# Patient Record
Sex: FEMALE | Race: WHITE | NOT HISPANIC OR LATINO
[De-identification: names, ages, dates, MRNs, and addresses within clinical notes are randomized per-mention and may not be internally consistent; named-entity substitution may affect disease eponyms.]

---

## 2017-07-26 ENCOUNTER — RX RENEWAL (OUTPATIENT)
Age: 57
End: 2017-07-26

## 2017-07-27 ENCOUNTER — APPOINTMENT (OUTPATIENT)
Dept: MAMMOGRAPHY | Facility: CLINIC | Age: 57
End: 2017-07-27
Payer: COMMERCIAL

## 2017-07-27 ENCOUNTER — OUTPATIENT (OUTPATIENT)
Dept: OUTPATIENT SERVICES | Facility: HOSPITAL | Age: 57
LOS: 1 days | End: 2017-07-27
Payer: COMMERCIAL

## 2017-07-27 ENCOUNTER — APPOINTMENT (OUTPATIENT)
Dept: ULTRASOUND IMAGING | Facility: CLINIC | Age: 57
End: 2017-07-27
Payer: COMMERCIAL

## 2017-07-27 DIAGNOSIS — Z00.8 ENCOUNTER FOR OTHER GENERAL EXAMINATION: ICD-10-CM

## 2017-07-27 PROCEDURE — G0204: CPT | Mod: 26

## 2017-07-27 PROCEDURE — 76641 ULTRASOUND BREAST COMPLETE: CPT | Mod: 26,50

## 2017-07-27 PROCEDURE — G0279: CPT | Mod: 26

## 2017-07-27 PROCEDURE — 76641 ULTRASOUND BREAST COMPLETE: CPT

## 2017-07-27 PROCEDURE — 77066 DX MAMMO INCL CAD BI: CPT

## 2017-07-27 PROCEDURE — G0279: CPT

## 2017-08-03 ENCOUNTER — RESULT REVIEW (OUTPATIENT)
Age: 57
End: 2017-08-03

## 2017-08-03 ENCOUNTER — APPOINTMENT (OUTPATIENT)
Dept: ULTRASOUND IMAGING | Facility: CLINIC | Age: 57
End: 2017-08-03
Payer: COMMERCIAL

## 2017-08-03 ENCOUNTER — OUTPATIENT (OUTPATIENT)
Dept: OUTPATIENT SERVICES | Facility: HOSPITAL | Age: 57
LOS: 1 days | End: 2017-08-03
Payer: SELF-PAY

## 2017-08-03 DIAGNOSIS — Z00.8 ENCOUNTER FOR OTHER GENERAL EXAMINATION: ICD-10-CM

## 2017-08-03 PROCEDURE — 19084 BX BREAST ADD LESION US IMAG: CPT

## 2017-08-03 PROCEDURE — A4648: CPT

## 2017-08-03 PROCEDURE — 88305 TISSUE EXAM BY PATHOLOGIST: CPT

## 2017-08-03 PROCEDURE — 88305 TISSUE EXAM BY PATHOLOGIST: CPT | Mod: 26

## 2017-08-03 PROCEDURE — 77065 DX MAMMO INCL CAD UNI: CPT

## 2017-08-03 PROCEDURE — 19084 BX BREAST ADD LESION US IMAG: CPT | Mod: RT

## 2017-08-03 PROCEDURE — 19083 BX BREAST 1ST LESION US IMAG: CPT | Mod: RT

## 2017-08-03 PROCEDURE — 19083 BX BREAST 1ST LESION US IMAG: CPT

## 2017-08-03 PROCEDURE — G0206: CPT | Mod: 26,RT

## 2017-08-07 DIAGNOSIS — R92.8 OTHER ABNORMAL AND INCONCLUSIVE FINDINGS ON DIAGNOSTIC IMAGING OF BREAST: ICD-10-CM

## 2017-08-07 DIAGNOSIS — N63 UNSPECIFIED LUMP IN BREAST: ICD-10-CM

## 2017-08-14 ENCOUNTER — APPOINTMENT (OUTPATIENT)
Dept: SURGERY | Facility: CLINIC | Age: 57
End: 2017-08-14
Payer: COMMERCIAL

## 2017-08-14 VITALS
SYSTOLIC BLOOD PRESSURE: 166 MMHG | OXYGEN SATURATION: 99 % | WEIGHT: 190 LBS | RESPIRATION RATE: 14 BRPM | BODY MASS INDEX: 33.66 KG/M2 | HEIGHT: 63 IN | TEMPERATURE: 98 F | HEART RATE: 107 BPM | DIASTOLIC BLOOD PRESSURE: 104 MMHG

## 2017-08-14 DIAGNOSIS — N63 UNSPECIFIED LUMP IN BREAST: ICD-10-CM

## 2017-08-14 DIAGNOSIS — Z86.2 PERSONAL HISTORY OF DISEASES OF THE BLOOD AND BLOOD-FORMING ORGANS AND CERTAIN DISORDERS INVOLVING THE IMMUNE MECHANISM: ICD-10-CM

## 2017-08-14 PROCEDURE — 99205 OFFICE O/P NEW HI 60 MIN: CPT

## 2017-08-16 ENCOUNTER — OUTPATIENT (OUTPATIENT)
Dept: OUTPATIENT SERVICES | Facility: HOSPITAL | Age: 57
LOS: 1 days | Discharge: HOME | End: 2017-08-16

## 2017-08-16 PROBLEM — N63 LUMP OF RIGHT BREAST: Status: ACTIVE | Noted: 2017-08-16

## 2017-08-18 ENCOUNTER — APPOINTMENT (OUTPATIENT)
Dept: RADIATION ONCOLOGY | Facility: CLINIC | Age: 57
End: 2017-08-18
Payer: COMMERCIAL

## 2017-08-18 VITALS
DIASTOLIC BLOOD PRESSURE: 93 MMHG | HEART RATE: 84 BPM | HEIGHT: 63 IN | SYSTOLIC BLOOD PRESSURE: 137 MMHG | BODY MASS INDEX: 33.73 KG/M2 | RESPIRATION RATE: 16 BRPM | OXYGEN SATURATION: 98 % | WEIGHT: 190.4 LBS

## 2017-08-18 PROCEDURE — 99204 OFFICE O/P NEW MOD 45 MIN: CPT

## 2017-08-18 RX ORDER — POLYETHYLENE GLYCOL 3350, SODIUM CHLORIDE, POTASSIUM CHLORIDE, SODIUM BICARBONATE, AND SODIUM SULFATE 240; 5.84; 2.98; 6.72; 22.72 G/4L; G/4L; G/4L; G/4L; G/4L
240 POWDER, FOR SOLUTION ORAL
Qty: 4000 | Refills: 0 | Status: DISCONTINUED | COMMUNITY
Start: 2017-07-24 | End: 2017-08-18

## 2017-08-18 RX ORDER — AMOXICILLIN AND CLAVULANATE POTASSIUM 875; 125 MG/1; MG/1
875-125 TABLET, COATED ORAL
Qty: 20 | Refills: 0 | Status: DISCONTINUED | COMMUNITY
Start: 2017-06-12 | End: 2017-08-18

## 2017-08-18 RX ORDER — LIDOCAINE HYDROCHLORIDE 20 MG/ML
2 SOLUTION OROPHARYNGEAL
Qty: 100 | Refills: 0 | Status: DISCONTINUED | COMMUNITY
Start: 2017-06-12 | End: 2017-08-18

## 2017-08-18 RX ORDER — FLUCONAZOLE 150 MG/1
150 TABLET ORAL
Qty: 3 | Refills: 0 | Status: DISCONTINUED | COMMUNITY
Start: 2017-06-24 | End: 2017-08-18

## 2017-08-21 ENCOUNTER — OUTPATIENT (OUTPATIENT)
Dept: OUTPATIENT SERVICES | Facility: HOSPITAL | Age: 57
LOS: 1 days | End: 2017-08-21
Payer: COMMERCIAL

## 2017-08-21 ENCOUNTER — OUTPATIENT (OUTPATIENT)
Dept: OUTPATIENT SERVICES | Facility: HOSPITAL | Age: 57
LOS: 1 days | Discharge: HOME | End: 2017-08-21

## 2017-08-21 ENCOUNTER — APPOINTMENT (OUTPATIENT)
Dept: MRI IMAGING | Facility: CLINIC | Age: 57
End: 2017-08-21
Payer: COMMERCIAL

## 2017-08-21 DIAGNOSIS — C50.911 MALIGNANT NEOPLASM OF UNSPECIFIED SITE OF RIGHT FEMALE BREAST: ICD-10-CM

## 2017-08-21 DIAGNOSIS — C50.919 MALIGNANT NEOPLASM OF UNSPECIFIED SITE OF UNSPECIFIED FEMALE BREAST: ICD-10-CM

## 2017-08-21 DIAGNOSIS — R92.8 OTHER ABNORMAL AND INCONCLUSIVE FINDINGS ON DIAGNOSTIC IMAGING OF BREAST: ICD-10-CM

## 2017-08-21 PROCEDURE — C8937: CPT

## 2017-08-21 PROCEDURE — 77059 MRI BREAST BILATERAL: CPT | Mod: 26

## 2017-08-21 PROCEDURE — C8908: CPT

## 2017-08-21 PROCEDURE — 0159T: CPT | Mod: 26

## 2017-08-21 PROCEDURE — 82565 ASSAY OF CREATININE: CPT

## 2017-08-21 PROCEDURE — A9585: CPT

## 2017-08-22 ENCOUNTER — OUTPATIENT (OUTPATIENT)
Dept: OUTPATIENT SERVICES | Facility: HOSPITAL | Age: 57
LOS: 1 days | Discharge: HOME | End: 2017-08-22

## 2017-08-22 DIAGNOSIS — C50.919 MALIGNANT NEOPLASM OF UNSPECIFIED SITE OF UNSPECIFIED FEMALE BREAST: ICD-10-CM

## 2017-08-23 ENCOUNTER — RESULT REVIEW (OUTPATIENT)
Age: 57
End: 2017-08-23

## 2017-08-24 ENCOUNTER — APPOINTMENT (OUTPATIENT)
Dept: SURGERY | Facility: CLINIC | Age: 57
End: 2017-08-24

## 2017-08-28 ENCOUNTER — RESULT REVIEW (OUTPATIENT)
Age: 57
End: 2017-08-28

## 2017-08-28 ENCOUNTER — APPOINTMENT (OUTPATIENT)
Dept: MRI IMAGING | Facility: CLINIC | Age: 57
End: 2017-08-28
Payer: COMMERCIAL

## 2017-08-28 ENCOUNTER — OUTPATIENT (OUTPATIENT)
Dept: OUTPATIENT SERVICES | Facility: HOSPITAL | Age: 57
LOS: 1 days | End: 2017-08-28
Payer: COMMERCIAL

## 2017-08-28 DIAGNOSIS — Z00.8 ENCOUNTER FOR OTHER GENERAL EXAMINATION: ICD-10-CM

## 2017-08-28 PROCEDURE — 77065 DX MAMMO INCL CAD UNI: CPT

## 2017-08-28 PROCEDURE — 19086 BX BREAST ADD LESION MR IMAG: CPT | Mod: RT

## 2017-08-28 PROCEDURE — 19085 BX BREAST 1ST LESION MR IMAG: CPT

## 2017-08-28 PROCEDURE — 82565 ASSAY OF CREATININE: CPT

## 2017-08-28 PROCEDURE — G0206: CPT | Mod: 26,LT

## 2017-08-28 PROCEDURE — A9585: CPT

## 2017-08-28 PROCEDURE — 19085 BX BREAST 1ST LESION MR IMAG: CPT | Mod: RT

## 2017-08-30 ENCOUNTER — APPOINTMENT (OUTPATIENT)
Dept: SURGERY | Facility: CLINIC | Age: 57
End: 2017-08-30
Payer: COMMERCIAL

## 2017-08-30 VITALS
RESPIRATION RATE: 15 BRPM | OXYGEN SATURATION: 94 % | BODY MASS INDEX: 33.66 KG/M2 | SYSTOLIC BLOOD PRESSURE: 145 MMHG | HEIGHT: 63 IN | TEMPERATURE: 98.2 F | HEART RATE: 79 BPM | WEIGHT: 190 LBS | DIASTOLIC BLOOD PRESSURE: 93 MMHG

## 2017-08-30 DIAGNOSIS — R92.8 OTHER ABNORMAL AND INCONCLUSIVE FINDINGS ON DIAGNOSTIC IMAGING OF BREAST: ICD-10-CM

## 2017-08-30 DIAGNOSIS — C50.911 MALIGNANT NEOPLASM OF UNSPECIFIED SITE OF RIGHT FEMALE BREAST: ICD-10-CM

## 2017-08-30 PROCEDURE — 99215 OFFICE O/P EST HI 40 MIN: CPT

## 2017-09-06 ENCOUNTER — RESULT REVIEW (OUTPATIENT)
Age: 57
End: 2017-09-06

## 2017-09-06 ENCOUNTER — APPOINTMENT (OUTPATIENT)
Dept: HEMATOLOGY ONCOLOGY | Facility: CLINIC | Age: 57
End: 2017-09-06
Payer: COMMERCIAL

## 2017-09-06 VITALS
TEMPERATURE: 98.4 F | SYSTOLIC BLOOD PRESSURE: 143 MMHG | OXYGEN SATURATION: 99 % | DIASTOLIC BLOOD PRESSURE: 86 MMHG | BODY MASS INDEX: 34.87 KG/M2 | HEART RATE: 90 BPM | HEIGHT: 62.32 IN | RESPIRATION RATE: 16 BRPM | WEIGHT: 191.91 LBS

## 2017-09-06 DIAGNOSIS — K76.9 LIVER DISEASE, UNSPECIFIED: ICD-10-CM

## 2017-09-06 LAB
BASOPHILS # BLD AUTO: 0.1 K/UL — SIGNIFICANT CHANGE UP (ref 0–0.2)
BASOPHILS NFR BLD AUTO: 0.8 % — SIGNIFICANT CHANGE UP (ref 0–2)
EOSINOPHIL # BLD AUTO: 0.2 K/UL — SIGNIFICANT CHANGE UP (ref 0–0.5)
EOSINOPHIL NFR BLD AUTO: 2.3 % — SIGNIFICANT CHANGE UP (ref 0–6)
HCT VFR BLD CALC: 41.7 % — SIGNIFICANT CHANGE UP (ref 34.5–45)
HGB BLD-MCNC: 13.7 G/DL — SIGNIFICANT CHANGE UP (ref 11.5–15.5)
LYMPHOCYTES # BLD AUTO: 1.6 K/UL — SIGNIFICANT CHANGE UP (ref 1–3.3)
LYMPHOCYTES # BLD AUTO: 20.4 % — SIGNIFICANT CHANGE UP (ref 13–44)
MCHC RBC-ENTMCNC: 27.8 PG — SIGNIFICANT CHANGE UP (ref 27–34)
MCHC RBC-ENTMCNC: 32.8 GM/DL — SIGNIFICANT CHANGE UP (ref 32–36)
MCV RBC AUTO: 84.8 FL — SIGNIFICANT CHANGE UP (ref 80–100)
MONOCYTES # BLD AUTO: 0.5 K/UL — SIGNIFICANT CHANGE UP (ref 0–0.9)
MONOCYTES NFR BLD AUTO: 6 % — SIGNIFICANT CHANGE UP (ref 2–14)
NEUTROPHILS # BLD AUTO: 5.5 K/UL — SIGNIFICANT CHANGE UP (ref 1.8–7.4)
NEUTROPHILS NFR BLD AUTO: 70.4 % — SIGNIFICANT CHANGE UP (ref 43–77)
PLATELET # BLD AUTO: 290 K/UL — SIGNIFICANT CHANGE UP (ref 150–400)
RBC # BLD: 4.92 M/UL — SIGNIFICANT CHANGE UP (ref 3.8–5.2)
RBC # FLD: 11.4 % — SIGNIFICANT CHANGE UP (ref 10.3–14.5)
WBC # BLD: 7.8 K/UL — SIGNIFICANT CHANGE UP (ref 3.8–10.5)
WBC # FLD AUTO: 7.8 K/UL — SIGNIFICANT CHANGE UP (ref 3.8–10.5)

## 2017-09-06 PROCEDURE — 99205 OFFICE O/P NEW HI 60 MIN: CPT

## 2017-09-07 ENCOUNTER — FORM ENCOUNTER (OUTPATIENT)
Age: 57
End: 2017-09-07

## 2017-09-07 LAB
ALBUMIN SERPL ELPH-MCNC: 4.7 G/DL
ALP BLD-CCNC: 95 U/L
ALT SERPL-CCNC: 16 U/L
ANION GAP SERPL CALC-SCNC: 17 MMOL/L
AST SERPL-CCNC: 21 U/L
BILIRUB SERPL-MCNC: 0.3 MG/DL
BUN SERPL-MCNC: 22 MG/DL
CALCIUM SERPL-MCNC: 10.7 MG/DL
CHLORIDE SERPL-SCNC: 100 MMOL/L
CO2 SERPL-SCNC: 25 MMOL/L
CREAT SERPL-MCNC: 0.83 MG/DL
GLUCOSE SERPL-MCNC: 93 MG/DL
HBV CORE IGM SER QL: NONREACTIVE
HBV SURFACE AB SER QL: NONREACTIVE
HBV SURFACE AB SERPL IA-ACNC: <3 MIU/ML
HBV SURFACE AG SER QL: NONREACTIVE
HCV AB SER QL: NONREACTIVE
HCV S/CO RATIO: 0.27 S/CO
MAGNESIUM SERPL-MCNC: 2.1 MG/DL
POTASSIUM SERPL-SCNC: 4.1 MMOL/L
PROT SERPL-MCNC: 7.6 G/DL
SODIUM SERPL-SCNC: 142 MMOL/L

## 2017-09-08 ENCOUNTER — OUTPATIENT (OUTPATIENT)
Dept: OUTPATIENT SERVICES | Facility: HOSPITAL | Age: 57
LOS: 1 days | End: 2017-09-08
Payer: COMMERCIAL

## 2017-09-08 DIAGNOSIS — C50.911 MALIGNANT NEOPLASM OF UNSPECIFIED SITE OF RIGHT FEMALE BREAST: ICD-10-CM

## 2017-09-08 PROCEDURE — 93306 TTE W/DOPPLER COMPLETE: CPT

## 2017-09-08 PROCEDURE — 93306 TTE W/DOPPLER COMPLETE: CPT | Mod: 26

## 2017-09-11 ENCOUNTER — FORM ENCOUNTER (OUTPATIENT)
Age: 57
End: 2017-09-11

## 2017-09-12 ENCOUNTER — APPOINTMENT (OUTPATIENT)
Dept: ULTRASOUND IMAGING | Facility: CLINIC | Age: 57
End: 2017-09-12
Payer: COMMERCIAL

## 2017-09-12 ENCOUNTER — OUTPATIENT (OUTPATIENT)
Dept: OUTPATIENT SERVICES | Facility: HOSPITAL | Age: 57
LOS: 1 days | End: 2017-09-12

## 2017-09-12 DIAGNOSIS — Z00.8 ENCOUNTER FOR OTHER GENERAL EXAMINATION: ICD-10-CM

## 2017-09-12 PROCEDURE — 76705 ECHO EXAM OF ABDOMEN: CPT | Mod: 26

## 2017-09-13 ENCOUNTER — TRANSCRIPTION ENCOUNTER (OUTPATIENT)
Age: 57
End: 2017-09-13

## 2017-09-15 ENCOUNTER — OUTPATIENT (OUTPATIENT)
Dept: OUTPATIENT SERVICES | Facility: HOSPITAL | Age: 57
LOS: 1 days | End: 2017-09-15
Payer: COMMERCIAL

## 2017-09-15 VITALS
HEART RATE: 76 BPM | TEMPERATURE: 98 F | SYSTOLIC BLOOD PRESSURE: 137 MMHG | WEIGHT: 191.8 LBS | RESPIRATION RATE: 16 BRPM | DIASTOLIC BLOOD PRESSURE: 91 MMHG | HEIGHT: 62 IN

## 2017-09-15 DIAGNOSIS — Z98.891 HISTORY OF UTERINE SCAR FROM PREVIOUS SURGERY: Chronic | ICD-10-CM

## 2017-09-15 DIAGNOSIS — Z90.710 ACQUIRED ABSENCE OF BOTH CERVIX AND UTERUS: Chronic | ICD-10-CM

## 2017-09-15 DIAGNOSIS — Z98.890 OTHER SPECIFIED POSTPROCEDURAL STATES: Chronic | ICD-10-CM

## 2017-09-15 DIAGNOSIS — Z01.818 ENCOUNTER FOR OTHER PREPROCEDURAL EXAMINATION: ICD-10-CM

## 2017-09-15 DIAGNOSIS — C50.911 MALIGNANT NEOPLASM OF UNSPECIFIED SITE OF RIGHT FEMALE BREAST: ICD-10-CM

## 2017-09-15 DIAGNOSIS — I10 ESSENTIAL (PRIMARY) HYPERTENSION: ICD-10-CM

## 2017-09-15 PROCEDURE — 93010 ELECTROCARDIOGRAM REPORT: CPT

## 2017-09-15 PROCEDURE — 93005 ELECTROCARDIOGRAM TRACING: CPT

## 2017-09-15 PROCEDURE — G0463: CPT

## 2017-09-15 RX ORDER — SODIUM CHLORIDE 9 MG/ML
3 INJECTION INTRAMUSCULAR; INTRAVENOUS; SUBCUTANEOUS ONCE
Qty: 0 | Refills: 0 | Status: DISCONTINUED | OUTPATIENT
Start: 2017-09-21 | End: 2017-10-06

## 2017-09-15 NOTE — H&P PST ADULT - FAMILY HISTORY
Father  Still living? No  Family history of lung cancer, Age at diagnosis: 71-80     Mother  Still living? No  Family history of lung cancer, Age at diagnosis: 61-70     Child  Still living? Yes, Estimated age: Age Unknown  Family history of fibromyalgia, Age at diagnosis: Age Unknown

## 2017-09-15 NOTE — H&P PST ADULT - NSANTHOSAYNRD_GEN_A_CORE
No. PONCE screening performed.  STOP BANG Legend: 0-2 = LOW Risk; 3-4 = INTERMEDIATE Risk; 5-8 = HIGH Risk

## 2017-09-15 NOTE — H&P PST ADULT - PSH
History of  section    History of hysterectomy    History of oral surgery    Status post endometrial ablation

## 2017-09-20 ENCOUNTER — APPOINTMENT (OUTPATIENT)
Dept: INFUSION THERAPY | Facility: CLINIC | Age: 57
End: 2017-09-20

## 2017-09-20 ENCOUNTER — RESULT REVIEW (OUTPATIENT)
Age: 57
End: 2017-09-20

## 2017-09-20 ENCOUNTER — LABORATORY RESULT (OUTPATIENT)
Age: 57
End: 2017-09-20

## 2017-09-20 ENCOUNTER — CLINICAL ADVICE (OUTPATIENT)
Age: 57
End: 2017-09-20

## 2017-09-20 LAB
BASOPHILS # BLD AUTO: 0 K/UL — SIGNIFICANT CHANGE UP (ref 0–0.2)
BASOPHILS NFR BLD AUTO: 0.4 % — SIGNIFICANT CHANGE UP (ref 0–2)
EOSINOPHIL # BLD AUTO: 0 K/UL — SIGNIFICANT CHANGE UP (ref 0–0.5)
EOSINOPHIL NFR BLD AUTO: 0.1 % — SIGNIFICANT CHANGE UP (ref 0–6)
HCT VFR BLD CALC: 42.3 % — SIGNIFICANT CHANGE UP (ref 34.5–45)
HGB BLD-MCNC: 15 G/DL — SIGNIFICANT CHANGE UP (ref 11.5–15.5)
LYMPHOCYTES # BLD AUTO: 1 K/UL — SIGNIFICANT CHANGE UP (ref 1–3.3)
LYMPHOCYTES # BLD AUTO: 7.7 % — LOW (ref 13–44)
MCHC RBC-ENTMCNC: 29.5 PG — SIGNIFICANT CHANGE UP (ref 27–34)
MCHC RBC-ENTMCNC: 35.4 GM/DL — SIGNIFICANT CHANGE UP (ref 32–36)
MCV RBC AUTO: 83.4 FL — SIGNIFICANT CHANGE UP (ref 80–100)
MONOCYTES # BLD AUTO: 0.2 K/UL — SIGNIFICANT CHANGE UP (ref 0–0.9)
MONOCYTES NFR BLD AUTO: 1.6 % — LOW (ref 2–14)
NEUTROPHILS # BLD AUTO: 12 K/UL — HIGH (ref 1.8–7.4)
NEUTROPHILS NFR BLD AUTO: 90.3 % — HIGH (ref 43–77)
PLATELET # BLD AUTO: 279 K/UL — SIGNIFICANT CHANGE UP (ref 150–400)
RBC # BLD: 5.07 M/UL — SIGNIFICANT CHANGE UP (ref 3.8–5.2)
RBC # FLD: 11.4 % — SIGNIFICANT CHANGE UP (ref 10.3–14.5)
WBC # BLD: 13.3 K/UL — HIGH (ref 3.8–10.5)
WBC # FLD AUTO: 13.3 K/UL — HIGH (ref 3.8–10.5)

## 2017-09-21 ENCOUNTER — OUTPATIENT (OUTPATIENT)
Dept: INPATIENT UNIT | Facility: HOSPITAL | Age: 57
LOS: 1 days | End: 2017-09-21
Payer: COMMERCIAL

## 2017-09-21 VITALS
HEART RATE: 72 BPM | DIASTOLIC BLOOD PRESSURE: 70 MMHG | OXYGEN SATURATION: 99 % | RESPIRATION RATE: 16 BRPM | SYSTOLIC BLOOD PRESSURE: 135 MMHG

## 2017-09-21 VITALS
WEIGHT: 191.8 LBS | TEMPERATURE: 98 F | OXYGEN SATURATION: 99 % | RESPIRATION RATE: 16 BRPM | SYSTOLIC BLOOD PRESSURE: 129 MMHG | HEIGHT: 62 IN | HEART RATE: 68 BPM | DIASTOLIC BLOOD PRESSURE: 73 MMHG

## 2017-09-21 DIAGNOSIS — Z90.710 ACQUIRED ABSENCE OF BOTH CERVIX AND UTERUS: Chronic | ICD-10-CM

## 2017-09-21 DIAGNOSIS — R11.2 NAUSEA WITH VOMITING, UNSPECIFIED: ICD-10-CM

## 2017-09-21 DIAGNOSIS — Z01.818 ENCOUNTER FOR OTHER PREPROCEDURAL EXAMINATION: ICD-10-CM

## 2017-09-21 DIAGNOSIS — Z98.890 OTHER SPECIFIED POSTPROCEDURAL STATES: Chronic | ICD-10-CM

## 2017-09-21 DIAGNOSIS — Z51.11 ENCOUNTER FOR ANTINEOPLASTIC CHEMOTHERAPY: ICD-10-CM

## 2017-09-21 DIAGNOSIS — Z98.891 HISTORY OF UTERINE SCAR FROM PREVIOUS SURGERY: Chronic | ICD-10-CM

## 2017-09-21 LAB
INR BLD: 0.97 RATIO — SIGNIFICANT CHANGE UP (ref 0.88–1.16)
PROTHROM AB SERPL-ACNC: 10.7 SEC — SIGNIFICANT CHANGE UP (ref 9.8–12.7)

## 2017-09-21 PROCEDURE — 36561 INSERT TUNNELED CV CATH: CPT | Mod: LT

## 2017-09-21 PROCEDURE — C1788: CPT

## 2017-09-21 PROCEDURE — C1894: CPT

## 2017-09-21 PROCEDURE — 76942 ECHO GUIDE FOR BIOPSY: CPT

## 2017-09-21 PROCEDURE — 76937 US GUIDE VASCULAR ACCESS: CPT | Mod: 26

## 2017-09-21 PROCEDURE — 77001 FLUOROGUIDE FOR VEIN DEVICE: CPT

## 2017-09-21 PROCEDURE — 36415 COLL VENOUS BLD VENIPUNCTURE: CPT

## 2017-09-21 PROCEDURE — 77001 FLUOROGUIDE FOR VEIN DEVICE: CPT | Mod: 26

## 2017-09-21 PROCEDURE — C1769: CPT

## 2017-09-21 PROCEDURE — 85610 PROTHROMBIN TIME: CPT

## 2017-09-21 PROCEDURE — 36561 INSERT TUNNELED CV CATH: CPT | Mod: RT

## 2017-09-21 RX ORDER — ONDANSETRON 8 MG/1
4 TABLET, FILM COATED ORAL ONCE
Qty: 0 | Refills: 0 | Status: DISCONTINUED | OUTPATIENT
Start: 2017-09-21 | End: 2017-09-21

## 2017-09-21 RX ORDER — SODIUM CHLORIDE 9 MG/ML
1000 INJECTION INTRAMUSCULAR; INTRAVENOUS; SUBCUTANEOUS
Qty: 0 | Refills: 0 | Status: DISCONTINUED | OUTPATIENT
Start: 2017-09-21 | End: 2017-09-21

## 2017-09-21 RX ORDER — FENTANYL CITRATE 50 UG/ML
50 INJECTION INTRAVENOUS
Qty: 0 | Refills: 0 | Status: DISCONTINUED | OUTPATIENT
Start: 2017-09-21 | End: 2017-09-21

## 2017-09-21 RX ORDER — DEXAMETHASONE 0.5 MG/5ML
8 ELIXIR ORAL ONCE
Qty: 0 | Refills: 0 | Status: DISCONTINUED | OUTPATIENT
Start: 2017-09-21 | End: 2017-09-21

## 2017-09-21 RX ORDER — HYDROMORPHONE HYDROCHLORIDE 2 MG/ML
0.5 INJECTION INTRAMUSCULAR; INTRAVENOUS; SUBCUTANEOUS
Qty: 0 | Refills: 0 | Status: DISCONTINUED | OUTPATIENT
Start: 2017-09-21 | End: 2017-09-21

## 2017-09-21 NOTE — BRIEF OPERATIVE NOTE - PRE-OP DX
Carcinoma of overlapping sites of breast in female, estrogen receptor negative, unspecified laterality  09/21/2017    Active  Eduardo Alaniz

## 2017-09-21 NOTE — ASU DISCHARGE PLAN (ADULT/PEDIATRIC). - MEDICATION SUMMARY - MEDICATIONS TO TAKE
I will START or STAY ON the medications listed below when I get home from the hospital:    dexamethasone 4 mg oral tablet  -- 1 tab(s) by mouth 2 times a day  -- Indication: For pmd    CARBOplatin  -- Indication: For pmd    Tylenol 500 mg oral tablet  -- 2 tab(s) by mouth every 6 hours, As Needed  -- Indication: For pmd    Benicar HCT 40 mg-25 mg oral tablet  -- 1 tab(s) by mouth once a day  -- Indication: For pmd    Herceptin  -- Indication: For pmd    Perjeta  -- Indication: For pmd    Taxotere  -- Indication: For pmd    Multiple Vitamins oral tablet  -- 1 tab(s) by mouth once a day  -- Indication: For pmd    Vitamin D3 1000 intl units oral tablet  -- 2 tab(s) by mouth once a day  -- Indication: For pmd

## 2017-09-21 NOTE — BRIEF OPERATIVE NOTE - PROCEDURE
<<-----Click on this checkbox to enter Procedure Port fluoroscop surgical proc  09/21/2017    Active  SYD

## 2017-09-21 NOTE — BRIEF OPERATIVE NOTE - POST-OP DX
Carcinoma of female breast, unspecified estrogen receptor status, unspecified laterality, unspecified site of breast  09/21/2017    Active  Eduardo Alaniz

## 2017-09-26 ENCOUNTER — OUTPATIENT (OUTPATIENT)
Dept: OUTPATIENT SERVICES | Facility: HOSPITAL | Age: 57
LOS: 1 days | Discharge: HOME | End: 2017-09-26

## 2017-09-26 DIAGNOSIS — Z98.891 HISTORY OF UTERINE SCAR FROM PREVIOUS SURGERY: Chronic | ICD-10-CM

## 2017-09-26 DIAGNOSIS — Z98.890 OTHER SPECIFIED POSTPROCEDURAL STATES: Chronic | ICD-10-CM

## 2017-09-26 DIAGNOSIS — C50.919 MALIGNANT NEOPLASM OF UNSPECIFIED SITE OF UNSPECIFIED FEMALE BREAST: ICD-10-CM

## 2017-09-26 DIAGNOSIS — Z90.710 ACQUIRED ABSENCE OF BOTH CERVIX AND UTERUS: Chronic | ICD-10-CM

## 2017-09-27 ENCOUNTER — TRANSCRIPTION ENCOUNTER (OUTPATIENT)
Age: 57
End: 2017-09-27

## 2017-09-28 ENCOUNTER — LABORATORY RESULT (OUTPATIENT)
Age: 57
End: 2017-09-28

## 2017-09-28 ENCOUNTER — RESULT REVIEW (OUTPATIENT)
Age: 57
End: 2017-09-28

## 2017-09-28 ENCOUNTER — APPOINTMENT (OUTPATIENT)
Dept: INFUSION THERAPY | Facility: CLINIC | Age: 57
End: 2017-09-28

## 2017-09-28 ENCOUNTER — APPOINTMENT (OUTPATIENT)
Dept: HEMATOLOGY ONCOLOGY | Facility: CLINIC | Age: 57
End: 2017-09-28
Payer: COMMERCIAL

## 2017-09-28 VITALS
SYSTOLIC BLOOD PRESSURE: 121 MMHG | DIASTOLIC BLOOD PRESSURE: 81 MMHG | HEART RATE: 104 BPM | TEMPERATURE: 98.6 F | WEIGHT: 188.14 LBS | OXYGEN SATURATION: 99 % | RESPIRATION RATE: 16 BRPM | BODY MASS INDEX: 34.06 KG/M2

## 2017-09-28 DIAGNOSIS — T45.1X5A AGRANULOCYTOSIS SECONDARY TO CANCER CHEMOTHERAPY: ICD-10-CM

## 2017-09-28 DIAGNOSIS — D70.1 AGRANULOCYTOSIS SECONDARY TO CANCER CHEMOTHERAPY: ICD-10-CM

## 2017-09-28 LAB
BASOPHILS # BLD AUTO: 0 K/UL — SIGNIFICANT CHANGE UP (ref 0–0.2)
BASOPHILS NFR BLD AUTO: 3.7 % — HIGH (ref 0–2)
EOSINOPHIL # BLD AUTO: 0 K/UL — SIGNIFICANT CHANGE UP (ref 0–0.5)
EOSINOPHIL NFR BLD AUTO: 2.9 % — SIGNIFICANT CHANGE UP (ref 0–6)
HCT VFR BLD CALC: 35.8 % — SIGNIFICANT CHANGE UP (ref 34.5–45)
HGB BLD-MCNC: 12.3 G/DL — SIGNIFICANT CHANGE UP (ref 11.5–15.5)
LG PLATELETS BLD QL AUTO: SLIGHT — SIGNIFICANT CHANGE UP
LYMPHOCYTES # BLD AUTO: 0.4 K/UL — LOW (ref 1–3.3)
LYMPHOCYTES # BLD AUTO: 56 % — HIGH (ref 13–44)
MCHC RBC-ENTMCNC: 28.4 PG — SIGNIFICANT CHANGE UP (ref 27–34)
MCHC RBC-ENTMCNC: 34.5 GM/DL — SIGNIFICANT CHANGE UP (ref 32–36)
MCV RBC AUTO: 82.4 FL — SIGNIFICANT CHANGE UP (ref 80–100)
MONOCYTES # BLD AUTO: 0.2 K/UL — SIGNIFICANT CHANGE UP (ref 0–0.9)
MONOCYTES NFR BLD AUTO: 19.3 % — HIGH (ref 2–14)
NEUTROPHILS # BLD AUTO: 0.1 K/UL — LOW (ref 1.8–7.4)
NEUTROPHILS NFR BLD AUTO: 18.1 % — LOW (ref 43–77)
PLAT MORPH BLD: NORMAL — SIGNIFICANT CHANGE UP
PLATELET # BLD AUTO: 152 K/UL — SIGNIFICANT CHANGE UP (ref 150–400)
RBC # BLD: 4.34 M/UL — SIGNIFICANT CHANGE UP (ref 3.8–5.2)
RBC # FLD: 10.7 % — SIGNIFICANT CHANGE UP (ref 10.3–14.5)
RBC BLD AUTO: NORMAL — SIGNIFICANT CHANGE UP
WBC # BLD: 0.8 K/UL — CRITICAL LOW (ref 3.8–10.5)
WBC # FLD AUTO: 0.8 K/UL — CRITICAL LOW (ref 3.8–10.5)

## 2017-09-28 PROCEDURE — 99214 OFFICE O/P EST MOD 30 MIN: CPT

## 2017-10-05 ENCOUNTER — RESULT REVIEW (OUTPATIENT)
Age: 57
End: 2017-10-05

## 2017-10-05 ENCOUNTER — APPOINTMENT (OUTPATIENT)
Dept: INFUSION THERAPY | Facility: CLINIC | Age: 57
End: 2017-10-05

## 2017-10-05 ENCOUNTER — APPOINTMENT (OUTPATIENT)
Dept: HEMATOLOGY ONCOLOGY | Facility: CLINIC | Age: 57
End: 2017-10-05

## 2017-10-05 PROBLEM — C50.911 HER2-POSITIVE CARCINOMA OF RIGHT BREAST: Status: RESOLVED | Noted: 2017-08-16 | Resolved: 2017-10-05

## 2017-10-05 PROBLEM — R92.8 ABNORMAL FINDINGS ON DIAGNOSTIC IMAGING OF BREAST: Status: ACTIVE | Noted: 2017-08-16

## 2017-10-05 LAB
BASOPHILS # BLD AUTO: 0 K/UL — SIGNIFICANT CHANGE UP (ref 0–0.2)
BASOPHILS NFR BLD AUTO: 0.6 % — SIGNIFICANT CHANGE UP (ref 0–2)
EOSINOPHIL # BLD AUTO: 0 K/UL — SIGNIFICANT CHANGE UP (ref 0–0.5)
EOSINOPHIL NFR BLD AUTO: 0.2 % — SIGNIFICANT CHANGE UP (ref 0–6)
HCT VFR BLD CALC: 35 % — SIGNIFICANT CHANGE UP (ref 34.5–45)
HGB BLD-MCNC: 11.8 G/DL — SIGNIFICANT CHANGE UP (ref 11.5–15.5)
LYMPHOCYTES # BLD AUTO: 1.5 K/UL — SIGNIFICANT CHANGE UP (ref 1–3.3)
LYMPHOCYTES # BLD AUTO: 23.3 % — SIGNIFICANT CHANGE UP (ref 13–44)
MCHC RBC-ENTMCNC: 28.2 PG — SIGNIFICANT CHANGE UP (ref 27–34)
MCHC RBC-ENTMCNC: 33.9 GM/DL — SIGNIFICANT CHANGE UP (ref 32–36)
MCV RBC AUTO: 83.3 FL — SIGNIFICANT CHANGE UP (ref 80–100)
MONOCYTES # BLD AUTO: 0.6 K/UL — SIGNIFICANT CHANGE UP (ref 0–0.9)
MONOCYTES NFR BLD AUTO: 9.1 % — SIGNIFICANT CHANGE UP (ref 2–14)
NEUTROPHILS # BLD AUTO: 4.4 K/UL — SIGNIFICANT CHANGE UP (ref 1.8–7.4)
NEUTROPHILS NFR BLD AUTO: 66.8 % — SIGNIFICANT CHANGE UP (ref 43–77)
PLATELET # BLD AUTO: 160 K/UL — SIGNIFICANT CHANGE UP (ref 150–400)
RBC # BLD: 4.2 M/UL — SIGNIFICANT CHANGE UP (ref 3.8–5.2)
RBC # FLD: 11.5 % — SIGNIFICANT CHANGE UP (ref 10.3–14.5)
WBC # BLD: 6.6 K/UL — SIGNIFICANT CHANGE UP (ref 3.8–10.5)
WBC # FLD AUTO: 6.6 K/UL — SIGNIFICANT CHANGE UP (ref 3.8–10.5)

## 2017-10-11 ENCOUNTER — LABORATORY RESULT (OUTPATIENT)
Age: 57
End: 2017-10-11

## 2017-10-11 ENCOUNTER — RESULT REVIEW (OUTPATIENT)
Age: 57
End: 2017-10-11

## 2017-10-11 ENCOUNTER — APPOINTMENT (OUTPATIENT)
Dept: INFUSION THERAPY | Facility: CLINIC | Age: 57
End: 2017-10-11
Payer: COMMERCIAL

## 2017-10-11 ENCOUNTER — APPOINTMENT (OUTPATIENT)
Dept: HEMATOLOGY ONCOLOGY | Facility: CLINIC | Age: 57
End: 2017-10-11
Payer: COMMERCIAL

## 2017-10-11 LAB
BASOPHILS # BLD AUTO: 0 K/UL — SIGNIFICANT CHANGE UP (ref 0–0.2)
BASOPHILS NFR BLD AUTO: 0.2 % — SIGNIFICANT CHANGE UP (ref 0–2)
EOSINOPHIL # BLD AUTO: 0 K/UL — SIGNIFICANT CHANGE UP (ref 0–0.5)
EOSINOPHIL NFR BLD AUTO: 0.1 % — SIGNIFICANT CHANGE UP (ref 0–6)
HCT VFR BLD CALC: 36.3 % — SIGNIFICANT CHANGE UP (ref 34.5–45)
HGB BLD-MCNC: 12.7 G/DL — SIGNIFICANT CHANGE UP (ref 11.5–15.5)
LYMPHOCYTES # BLD AUTO: 0.7 K/UL — LOW (ref 1–3.3)
LYMPHOCYTES # BLD AUTO: 5.7 % — LOW (ref 13–44)
MCHC RBC-ENTMCNC: 28.9 PG — SIGNIFICANT CHANGE UP (ref 27–34)
MCHC RBC-ENTMCNC: 34.8 GM/DL — SIGNIFICANT CHANGE UP (ref 32–36)
MCV RBC AUTO: 82.9 FL — SIGNIFICANT CHANGE UP (ref 80–100)
MONOCYTES # BLD AUTO: 0.3 K/UL — SIGNIFICANT CHANGE UP (ref 0–0.9)
MONOCYTES NFR BLD AUTO: 2.4 % — SIGNIFICANT CHANGE UP (ref 2–14)
NEUTROPHILS # BLD AUTO: 11.9 K/UL — HIGH (ref 1.8–7.4)
NEUTROPHILS NFR BLD AUTO: 91.6 % — HIGH (ref 43–77)
PLATELET # BLD AUTO: 270 K/UL — SIGNIFICANT CHANGE UP (ref 150–400)
RBC # BLD: 4.38 M/UL — SIGNIFICANT CHANGE UP (ref 3.8–5.2)
RBC # FLD: 12.3 % — SIGNIFICANT CHANGE UP (ref 10.3–14.5)
WBC # BLD: 13 K/UL — HIGH (ref 3.8–10.5)
WBC # FLD AUTO: 13 K/UL — HIGH (ref 3.8–10.5)

## 2017-10-11 PROCEDURE — G0008: CPT

## 2017-10-11 PROCEDURE — 99214 OFFICE O/P EST MOD 30 MIN: CPT | Mod: 25

## 2017-10-12 ENCOUNTER — OTHER (OUTPATIENT)
Age: 57
End: 2017-10-12

## 2017-10-12 DIAGNOSIS — Z51.89 ENCOUNTER FOR OTHER SPECIFIED AFTERCARE: ICD-10-CM

## 2017-10-12 DIAGNOSIS — Z51.11 ENCOUNTER FOR ANTINEOPLASTIC CHEMOTHERAPY: ICD-10-CM

## 2017-10-12 DIAGNOSIS — R11.2 NAUSEA WITH VOMITING, UNSPECIFIED: ICD-10-CM

## 2017-10-19 ENCOUNTER — LABORATORY RESULT (OUTPATIENT)
Age: 57
End: 2017-10-19

## 2017-10-19 ENCOUNTER — RESULT REVIEW (OUTPATIENT)
Age: 57
End: 2017-10-19

## 2017-10-19 ENCOUNTER — APPOINTMENT (OUTPATIENT)
Dept: INFUSION THERAPY | Facility: CLINIC | Age: 57
End: 2017-10-19

## 2017-10-19 ENCOUNTER — APPOINTMENT (OUTPATIENT)
Dept: HEMATOLOGY ONCOLOGY | Facility: CLINIC | Age: 57
End: 2017-10-19
Payer: COMMERCIAL

## 2017-10-19 VITALS
OXYGEN SATURATION: 97 % | HEART RATE: 101 BPM | WEIGHT: 186.29 LBS | TEMPERATURE: 98.1 F | DIASTOLIC BLOOD PRESSURE: 67 MMHG | SYSTOLIC BLOOD PRESSURE: 96 MMHG | BODY MASS INDEX: 33.73 KG/M2

## 2017-10-19 LAB
BASOPHILS # BLD AUTO: 0.1 K/UL — SIGNIFICANT CHANGE UP (ref 0–0.2)
BASOPHILS NFR BLD AUTO: 1.1 % — SIGNIFICANT CHANGE UP (ref 0–2)
EOSINOPHIL # BLD AUTO: 0 K/UL — SIGNIFICANT CHANGE UP (ref 0–0.5)
EOSINOPHIL NFR BLD AUTO: 0.1 % — SIGNIFICANT CHANGE UP (ref 0–6)
HCT VFR BLD CALC: 36 % — SIGNIFICANT CHANGE UP (ref 34.5–45)
HGB BLD-MCNC: 12.1 G/DL — SIGNIFICANT CHANGE UP (ref 11.5–15.5)
LYMPHOCYTES # BLD AUTO: 1.6 K/UL — SIGNIFICANT CHANGE UP (ref 1–3.3)
LYMPHOCYTES # BLD AUTO: 25 % — SIGNIFICANT CHANGE UP (ref 13–44)
MCHC RBC-ENTMCNC: 28.2 PG — SIGNIFICANT CHANGE UP (ref 27–34)
MCHC RBC-ENTMCNC: 33.7 GM/DL — SIGNIFICANT CHANGE UP (ref 32–36)
MCV RBC AUTO: 83.6 FL — SIGNIFICANT CHANGE UP (ref 80–100)
MONOCYTES # BLD AUTO: 0.9 K/UL — SIGNIFICANT CHANGE UP (ref 0–0.9)
MONOCYTES NFR BLD AUTO: 13.4 % — SIGNIFICANT CHANGE UP (ref 2–14)
NEUTROPHILS # BLD AUTO: 4 K/UL — SIGNIFICANT CHANGE UP (ref 1.8–7.4)
NEUTROPHILS NFR BLD AUTO: 60.4 % — SIGNIFICANT CHANGE UP (ref 43–77)
PLATELET # BLD AUTO: 203 K/UL — SIGNIFICANT CHANGE UP (ref 150–400)
RBC # BLD: 4.3 M/UL — SIGNIFICANT CHANGE UP (ref 3.8–5.2)
RBC # FLD: 12 % — SIGNIFICANT CHANGE UP (ref 10.3–14.5)
WBC # BLD: 6.6 K/UL — SIGNIFICANT CHANGE UP (ref 3.8–10.5)
WBC # FLD AUTO: 6.6 K/UL — SIGNIFICANT CHANGE UP (ref 3.8–10.5)

## 2017-10-19 PROCEDURE — 99214 OFFICE O/P EST MOD 30 MIN: CPT

## 2017-10-23 ENCOUNTER — APPOINTMENT (OUTPATIENT)
Dept: CT IMAGING | Facility: CLINIC | Age: 57
End: 2017-10-23
Payer: COMMERCIAL

## 2017-10-23 ENCOUNTER — OUTPATIENT (OUTPATIENT)
Dept: OUTPATIENT SERVICES | Facility: HOSPITAL | Age: 57
LOS: 1 days | End: 2017-10-23
Payer: COMMERCIAL

## 2017-10-23 DIAGNOSIS — Z98.891 HISTORY OF UTERINE SCAR FROM PREVIOUS SURGERY: Chronic | ICD-10-CM

## 2017-10-23 DIAGNOSIS — Z98.890 OTHER SPECIFIED POSTPROCEDURAL STATES: Chronic | ICD-10-CM

## 2017-10-23 DIAGNOSIS — Z00.8 ENCOUNTER FOR OTHER GENERAL EXAMINATION: ICD-10-CM

## 2017-10-23 DIAGNOSIS — Z90.710 ACQUIRED ABSENCE OF BOTH CERVIX AND UTERUS: Chronic | ICD-10-CM

## 2017-10-23 PROCEDURE — 74174 CTA ABD&PLVS W/CONTRAST: CPT

## 2017-10-23 PROCEDURE — 82565 ASSAY OF CREATININE: CPT

## 2017-10-23 PROCEDURE — 74174 CTA ABD&PLVS W/CONTRAST: CPT | Mod: 26

## 2017-10-24 ENCOUNTER — APPOINTMENT (OUTPATIENT)
Dept: HEMATOLOGY ONCOLOGY | Facility: CLINIC | Age: 57
End: 2017-10-24

## 2017-10-24 ENCOUNTER — OUTPATIENT (OUTPATIENT)
Dept: OUTPATIENT SERVICES | Facility: HOSPITAL | Age: 57
LOS: 1 days | Discharge: HOME | End: 2017-10-24

## 2017-10-24 DIAGNOSIS — Z98.890 OTHER SPECIFIED POSTPROCEDURAL STATES: Chronic | ICD-10-CM

## 2017-10-24 DIAGNOSIS — Z90.710 ACQUIRED ABSENCE OF BOTH CERVIX AND UTERUS: Chronic | ICD-10-CM

## 2017-10-24 DIAGNOSIS — Z98.891 HISTORY OF UTERINE SCAR FROM PREVIOUS SURGERY: Chronic | ICD-10-CM

## 2017-10-24 DIAGNOSIS — C50.919 MALIGNANT NEOPLASM OF UNSPECIFIED SITE OF UNSPECIFIED FEMALE BREAST: ICD-10-CM

## 2017-10-31 NOTE — ASU PATIENT PROFILE, ADULT - FALLEN IN THE PAST
59 year old woman s/p direct laryngoscopy and tracheoplasty, SICU consult for respiratory care    Plan:    Neuro: post-op pain  pain control tylenol and oxycodone    Respiratory: s/p tracheostomy revision  continue current vent settings    CV: hypertension  continue norvasc    GI: no active issues  diet per ENT - clear liquid    : no active issues    Heme: DVT ppx  SQH    Dispo: no

## 2017-11-01 ENCOUNTER — APPOINTMENT (OUTPATIENT)
Dept: INFUSION THERAPY | Facility: CLINIC | Age: 57
End: 2017-11-01

## 2017-11-01 ENCOUNTER — LABORATORY RESULT (OUTPATIENT)
Age: 57
End: 2017-11-01

## 2017-11-01 ENCOUNTER — RESULT REVIEW (OUTPATIENT)
Age: 57
End: 2017-11-01

## 2017-11-01 LAB
BASOPHILS # BLD AUTO: 0.1 K/UL — SIGNIFICANT CHANGE UP (ref 0–0.2)
BASOPHILS NFR BLD AUTO: 0.6 % — SIGNIFICANT CHANGE UP (ref 0–2)
EOSINOPHIL # BLD AUTO: 0 K/UL — SIGNIFICANT CHANGE UP (ref 0–0.5)
EOSINOPHIL NFR BLD AUTO: 0 % — SIGNIFICANT CHANGE UP (ref 0–6)
HCT VFR BLD CALC: 30.4 % — LOW (ref 34.5–45)
HGB BLD-MCNC: 11.1 G/DL — LOW (ref 11.5–15.5)
LYMPHOCYTES # BLD AUTO: 1.1 K/UL — SIGNIFICANT CHANGE UP (ref 1–3.3)
LYMPHOCYTES # BLD AUTO: 9.5 % — LOW (ref 13–44)
MCHC RBC-ENTMCNC: 30.2 PG — SIGNIFICANT CHANGE UP (ref 27–34)
MCHC RBC-ENTMCNC: 36.5 GM/DL — HIGH (ref 32–36)
MCV RBC AUTO: 82.8 FL — SIGNIFICANT CHANGE UP (ref 80–100)
MONOCYTES # BLD AUTO: 0.7 K/UL — SIGNIFICANT CHANGE UP (ref 0–0.9)
MONOCYTES NFR BLD AUTO: 5.9 % — SIGNIFICANT CHANGE UP (ref 2–14)
NEUTROPHILS # BLD AUTO: 10 K/UL — HIGH (ref 1.8–7.4)
NEUTROPHILS NFR BLD AUTO: 84 % — HIGH (ref 43–77)
PLATELET # BLD AUTO: 406 K/UL — HIGH (ref 150–400)
RBC # BLD: 3.68 M/UL — LOW (ref 3.8–5.2)
RBC # FLD: 13.4 % — SIGNIFICANT CHANGE UP (ref 10.3–14.5)
WBC # BLD: 11.9 K/UL — HIGH (ref 3.8–10.5)
WBC # FLD AUTO: 11.9 K/UL — HIGH (ref 3.8–10.5)

## 2017-11-02 DIAGNOSIS — R11.2 NAUSEA WITH VOMITING, UNSPECIFIED: ICD-10-CM

## 2017-11-02 DIAGNOSIS — Z51.89 ENCOUNTER FOR OTHER SPECIFIED AFTERCARE: ICD-10-CM

## 2017-11-02 DIAGNOSIS — Z51.11 ENCOUNTER FOR ANTINEOPLASTIC CHEMOTHERAPY: ICD-10-CM

## 2017-11-05 ENCOUNTER — FORM ENCOUNTER (OUTPATIENT)
Age: 57
End: 2017-11-05

## 2017-11-06 ENCOUNTER — APPOINTMENT (OUTPATIENT)
Dept: MRI IMAGING | Facility: CLINIC | Age: 57
End: 2017-11-06
Payer: COMMERCIAL

## 2017-11-06 ENCOUNTER — OUTPATIENT (OUTPATIENT)
Dept: OUTPATIENT SERVICES | Facility: HOSPITAL | Age: 57
LOS: 1 days | End: 2017-11-06

## 2017-11-06 DIAGNOSIS — Z98.890 OTHER SPECIFIED POSTPROCEDURAL STATES: Chronic | ICD-10-CM

## 2017-11-06 DIAGNOSIS — Z98.891 HISTORY OF UTERINE SCAR FROM PREVIOUS SURGERY: Chronic | ICD-10-CM

## 2017-11-06 DIAGNOSIS — Z00.8 ENCOUNTER FOR OTHER GENERAL EXAMINATION: ICD-10-CM

## 2017-11-06 DIAGNOSIS — Z90.710 ACQUIRED ABSENCE OF BOTH CERVIX AND UTERUS: Chronic | ICD-10-CM

## 2017-11-06 PROCEDURE — 74183 MRI ABD W/O CNTR FLWD CNTR: CPT | Mod: 26

## 2017-11-09 ENCOUNTER — APPOINTMENT (OUTPATIENT)
Dept: INFUSION THERAPY | Facility: CLINIC | Age: 57
End: 2017-11-09

## 2017-11-09 ENCOUNTER — RESULT REVIEW (OUTPATIENT)
Age: 57
End: 2017-11-09

## 2017-11-09 ENCOUNTER — LABORATORY RESULT (OUTPATIENT)
Age: 57
End: 2017-11-09

## 2017-11-09 ENCOUNTER — APPOINTMENT (OUTPATIENT)
Dept: HEMATOLOGY ONCOLOGY | Facility: CLINIC | Age: 57
End: 2017-11-09
Payer: COMMERCIAL

## 2017-11-09 VITALS
HEART RATE: 13 BPM | BODY MASS INDEX: 33.75 KG/M2 | OXYGEN SATURATION: 99 % | WEIGHT: 186.4 LBS | TEMPERATURE: 97.9 F | SYSTOLIC BLOOD PRESSURE: 123 MMHG | DIASTOLIC BLOOD PRESSURE: 80 MMHG | RESPIRATION RATE: 16 BRPM

## 2017-11-09 LAB
ANISOCYTOSIS BLD QL: SLIGHT — SIGNIFICANT CHANGE UP
BASOPHILS # BLD AUTO: 0.2 K/UL — SIGNIFICANT CHANGE UP (ref 0–0.2)
DACRYOCYTES BLD QL SMEAR: SLIGHT — SIGNIFICANT CHANGE UP
EOSINOPHIL # BLD AUTO: 0 K/UL — SIGNIFICANT CHANGE UP (ref 0–0.5)
HCT VFR BLD CALC: 31.5 % — LOW (ref 34.5–45)
HGB BLD-MCNC: 10.5 G/DL — LOW (ref 11.5–15.5)
LYMPHOCYTES # BLD AUTO: 17 % — SIGNIFICANT CHANGE UP (ref 13–44)
LYMPHOCYTES # BLD AUTO: 2 K/UL — SIGNIFICANT CHANGE UP (ref 1–3.3)
MCHC RBC-ENTMCNC: 28 PG — SIGNIFICANT CHANGE UP (ref 27–34)
MCHC RBC-ENTMCNC: 33.4 GM/DL — SIGNIFICANT CHANGE UP (ref 32–36)
MCV RBC AUTO: 83.9 FL — SIGNIFICANT CHANGE UP (ref 80–100)
METAMYELOCYTES # FLD: 2 % — HIGH (ref 0–0)
MONOCYTES # BLD AUTO: 1.6 K/UL — HIGH (ref 0–0.9)
MONOCYTES NFR BLD AUTO: 11 % — SIGNIFICANT CHANGE UP (ref 2–14)
NEUTROPHILS # BLD AUTO: 13.7 K/UL — HIGH (ref 1.8–7.4)
NEUTROPHILS NFR BLD AUTO: 64 % — SIGNIFICANT CHANGE UP (ref 43–77)
NEUTS BAND # BLD: 5 % — SIGNIFICANT CHANGE UP (ref 0–8)
PLAT MORPH BLD: NORMAL — SIGNIFICANT CHANGE UP
PLATELET # BLD AUTO: 371 K/UL — SIGNIFICANT CHANGE UP (ref 150–400)
POIKILOCYTOSIS BLD QL AUTO: SLIGHT — SIGNIFICANT CHANGE UP
POLYCHROMASIA BLD QL SMEAR: SLIGHT — SIGNIFICANT CHANGE UP
RBC # BLD: 3.76 M/UL — LOW (ref 3.8–5.2)
RBC # FLD: 13.7 % — SIGNIFICANT CHANGE UP (ref 10.3–14.5)
RBC BLD AUTO: SIGNIFICANT CHANGE UP
VARIANT LYMPHS # BLD: 1 % — SIGNIFICANT CHANGE UP (ref 0–6)
WBC # BLD: 17.4 K/UL — HIGH (ref 3.8–10.5)
WBC # FLD AUTO: 17.4 K/UL — HIGH (ref 3.8–10.5)

## 2017-11-09 PROCEDURE — 99214 OFFICE O/P EST MOD 30 MIN: CPT

## 2017-11-16 ENCOUNTER — APPOINTMENT (OUTPATIENT)
Dept: HEMATOLOGY ONCOLOGY | Facility: CLINIC | Age: 57
End: 2017-11-16

## 2017-11-22 ENCOUNTER — LABORATORY RESULT (OUTPATIENT)
Age: 57
End: 2017-11-22

## 2017-11-22 ENCOUNTER — RESULT REVIEW (OUTPATIENT)
Age: 57
End: 2017-11-22

## 2017-11-22 ENCOUNTER — APPOINTMENT (OUTPATIENT)
Dept: INFUSION THERAPY | Facility: CLINIC | Age: 57
End: 2017-11-22

## 2017-11-22 LAB
BASOPHILS # BLD AUTO: 0 K/UL — SIGNIFICANT CHANGE UP (ref 0–0.2)
BASOPHILS NFR BLD AUTO: 0.2 % — SIGNIFICANT CHANGE UP (ref 0–2)
EOSINOPHIL # BLD AUTO: 0 K/UL — SIGNIFICANT CHANGE UP (ref 0–0.5)
EOSINOPHIL NFR BLD AUTO: 0 % — SIGNIFICANT CHANGE UP (ref 0–6)
HCT VFR BLD CALC: 29.3 % — LOW (ref 34.5–45)
HGB BLD-MCNC: 10.2 G/DL — LOW (ref 11.5–15.5)
LYMPHOCYTES # BLD AUTO: 0.8 K/UL — LOW (ref 1–3.3)
LYMPHOCYTES # BLD AUTO: 7.1 % — LOW (ref 13–44)
MCHC RBC-ENTMCNC: 29.9 PG — SIGNIFICANT CHANGE UP (ref 27–34)
MCHC RBC-ENTMCNC: 34.9 GM/DL — SIGNIFICANT CHANGE UP (ref 32–36)
MCV RBC AUTO: 85.5 FL — SIGNIFICANT CHANGE UP (ref 80–100)
MONOCYTES # BLD AUTO: 0.3 K/UL — SIGNIFICANT CHANGE UP (ref 0–0.9)
MONOCYTES NFR BLD AUTO: 2.2 % — SIGNIFICANT CHANGE UP (ref 2–14)
NEUTROPHILS # BLD AUTO: 10.6 K/UL — HIGH (ref 1.8–7.4)
NEUTROPHILS NFR BLD AUTO: 90.5 % — HIGH (ref 43–77)
PLATELET # BLD AUTO: 212 K/UL — SIGNIFICANT CHANGE UP (ref 150–400)
RBC # BLD: 3.43 M/UL — LOW (ref 3.8–5.2)
RBC # FLD: 16.3 % — HIGH (ref 10.3–14.5)
WBC # BLD: 11.7 K/UL — HIGH (ref 3.8–10.5)
WBC # FLD AUTO: 11.7 K/UL — HIGH (ref 3.8–10.5)

## 2017-11-27 ENCOUNTER — OUTPATIENT (OUTPATIENT)
Dept: OUTPATIENT SERVICES | Facility: HOSPITAL | Age: 57
LOS: 1 days | Discharge: HOME | End: 2017-11-27

## 2017-11-27 DIAGNOSIS — Z90.710 ACQUIRED ABSENCE OF BOTH CERVIX AND UTERUS: Chronic | ICD-10-CM

## 2017-11-27 DIAGNOSIS — Z98.890 OTHER SPECIFIED POSTPROCEDURAL STATES: Chronic | ICD-10-CM

## 2017-11-27 DIAGNOSIS — Z98.891 HISTORY OF UTERINE SCAR FROM PREVIOUS SURGERY: Chronic | ICD-10-CM

## 2017-11-27 DIAGNOSIS — C50.919 MALIGNANT NEOPLASM OF UNSPECIFIED SITE OF UNSPECIFIED FEMALE BREAST: ICD-10-CM

## 2017-11-30 ENCOUNTER — APPOINTMENT (OUTPATIENT)
Dept: INFUSION THERAPY | Facility: CLINIC | Age: 57
End: 2017-11-30

## 2017-11-30 ENCOUNTER — RESULT REVIEW (OUTPATIENT)
Age: 57
End: 2017-11-30

## 2017-11-30 ENCOUNTER — LABORATORY RESULT (OUTPATIENT)
Age: 57
End: 2017-11-30

## 2017-11-30 ENCOUNTER — APPOINTMENT (OUTPATIENT)
Dept: HEMATOLOGY ONCOLOGY | Facility: CLINIC | Age: 57
End: 2017-11-30
Payer: COMMERCIAL

## 2017-11-30 VITALS
WEIGHT: 189.13 LBS | SYSTOLIC BLOOD PRESSURE: 120 MMHG | TEMPERATURE: 98.2 F | OXYGEN SATURATION: 99 % | RESPIRATION RATE: 16 BRPM | BODY MASS INDEX: 34.24 KG/M2 | DIASTOLIC BLOOD PRESSURE: 80 MMHG | HEART RATE: 108 BPM

## 2017-11-30 LAB
BASOPHILS # BLD AUTO: 0.1 K/UL — SIGNIFICANT CHANGE UP (ref 0–0.2)
BASOPHILS NFR BLD AUTO: 0.7 % — SIGNIFICANT CHANGE UP (ref 0–2)
EOSINOPHIL # BLD AUTO: 0 K/UL — SIGNIFICANT CHANGE UP (ref 0–0.5)
EOSINOPHIL NFR BLD AUTO: 0.2 % — SIGNIFICANT CHANGE UP (ref 0–6)
HCT VFR BLD CALC: 28.8 % — LOW (ref 34.5–45)
HGB BLD-MCNC: 9.7 G/DL — LOW (ref 11.5–15.5)
LYMPHOCYTES # BLD AUTO: 1.8 K/UL — SIGNIFICANT CHANGE UP (ref 1–3.3)
LYMPHOCYTES # BLD AUTO: 11.5 % — LOW (ref 13–44)
MCHC RBC-ENTMCNC: 29.4 PG — SIGNIFICANT CHANGE UP (ref 27–34)
MCHC RBC-ENTMCNC: 33.5 GM/DL — SIGNIFICANT CHANGE UP (ref 32–36)
MCV RBC AUTO: 87.8 FL — SIGNIFICANT CHANGE UP (ref 80–100)
MONOCYTES # BLD AUTO: 1.3 K/UL — HIGH (ref 0–0.9)
MONOCYTES NFR BLD AUTO: 8.5 % — SIGNIFICANT CHANGE UP (ref 2–14)
NEUTROPHILS # BLD AUTO: 12.3 K/UL — HIGH (ref 1.8–7.4)
NEUTROPHILS NFR BLD AUTO: 79.2 % — HIGH (ref 43–77)
PLATELET # BLD AUTO: 300 K/UL — SIGNIFICANT CHANGE UP (ref 150–400)
RBC # BLD: 3.29 M/UL — LOW (ref 3.8–5.2)
RBC # FLD: 15.9 % — HIGH (ref 10.3–14.5)
WBC # BLD: 15.5 K/UL — HIGH (ref 3.8–10.5)
WBC # FLD AUTO: 15.5 K/UL — HIGH (ref 3.8–10.5)

## 2017-11-30 PROCEDURE — 99214 OFFICE O/P EST MOD 30 MIN: CPT

## 2017-11-30 RX ORDER — SODIUM FLUORIDE 6 MG/ML
1.1 PASTE, DENTIFRICE DENTAL
Qty: 100 | Refills: 0 | Status: DISCONTINUED | COMMUNITY
Start: 2017-09-14

## 2017-11-30 RX ORDER — MAGNESIUM OXIDE 400 MG
400 (241.3 MG) TABLET ORAL
Qty: 60 | Refills: 0 | Status: DISCONTINUED | COMMUNITY
Start: 2017-11-09

## 2017-12-01 ENCOUNTER — RX RENEWAL (OUTPATIENT)
Age: 57
End: 2017-12-01

## 2017-12-01 DIAGNOSIS — E86.0 DEHYDRATION: ICD-10-CM

## 2017-12-05 ENCOUNTER — APPOINTMENT (OUTPATIENT)
Dept: CARDIOLOGY | Facility: CLINIC | Age: 57
End: 2017-12-05
Payer: COMMERCIAL

## 2017-12-05 PROCEDURE — 76376 3D RENDER W/INTRP POSTPROCES: CPT | Mod: 26

## 2017-12-05 PROCEDURE — 93306 TTE W/DOPPLER COMPLETE: CPT

## 2017-12-05 PROCEDURE — 0399T: CPT | Mod: 59

## 2017-12-12 ENCOUNTER — RESULT REVIEW (OUTPATIENT)
Age: 57
End: 2017-12-12

## 2017-12-12 ENCOUNTER — LABORATORY RESULT (OUTPATIENT)
Age: 57
End: 2017-12-12

## 2017-12-12 ENCOUNTER — APPOINTMENT (OUTPATIENT)
Dept: INFUSION THERAPY | Facility: CLINIC | Age: 57
End: 2017-12-12

## 2017-12-12 ENCOUNTER — APPOINTMENT (OUTPATIENT)
Dept: HEMATOLOGY ONCOLOGY | Facility: CLINIC | Age: 57
End: 2017-12-12
Payer: COMMERCIAL

## 2017-12-12 VITALS
DIASTOLIC BLOOD PRESSURE: 80 MMHG | WEIGHT: 192.13 LBS | SYSTOLIC BLOOD PRESSURE: 120 MMHG | OXYGEN SATURATION: 97 % | BODY MASS INDEX: 34.78 KG/M2 | HEART RATE: 112 BPM | TEMPERATURE: 98.6 F

## 2017-12-12 LAB
BASOPHILS # BLD AUTO: 0 K/UL — SIGNIFICANT CHANGE UP (ref 0–0.2)
BASOPHILS NFR BLD AUTO: 0.3 % — SIGNIFICANT CHANGE UP (ref 0–2)
EOSINOPHIL # BLD AUTO: 0 K/UL — SIGNIFICANT CHANGE UP (ref 0–0.5)
EOSINOPHIL NFR BLD AUTO: 0 % — SIGNIFICANT CHANGE UP (ref 0–6)
HCT VFR BLD CALC: 29.1 % — LOW (ref 34.5–45)
HGB BLD-MCNC: 9.9 G/DL — LOW (ref 11.5–15.5)
LYMPHOCYTES # BLD AUTO: 0.4 K/UL — LOW (ref 1–3.3)
LYMPHOCYTES # BLD AUTO: 5.4 % — LOW (ref 13–44)
MCHC RBC-ENTMCNC: 31 PG — SIGNIFICANT CHANGE UP (ref 27–34)
MCHC RBC-ENTMCNC: 34 GM/DL — SIGNIFICANT CHANGE UP (ref 32–36)
MCV RBC AUTO: 91.1 FL — SIGNIFICANT CHANGE UP (ref 80–100)
MONOCYTES # BLD AUTO: 0.1 K/UL — SIGNIFICANT CHANGE UP (ref 0–0.9)
MONOCYTES NFR BLD AUTO: 1.6 % — LOW (ref 2–14)
NEUTROPHILS # BLD AUTO: 6.5 K/UL — SIGNIFICANT CHANGE UP (ref 1.8–7.4)
NEUTROPHILS NFR BLD AUTO: 92.8 % — HIGH (ref 43–77)
PLATELET # BLD AUTO: 182 K/UL — SIGNIFICANT CHANGE UP (ref 150–400)
RBC # BLD: 3.2 M/UL — LOW (ref 3.8–5.2)
RBC # FLD: 17.4 % — HIGH (ref 10.3–14.5)
WBC # BLD: 7 K/UL — SIGNIFICANT CHANGE UP (ref 3.8–10.5)
WBC # FLD AUTO: 7 K/UL — SIGNIFICANT CHANGE UP (ref 3.8–10.5)

## 2017-12-12 PROCEDURE — 99214 OFFICE O/P EST MOD 30 MIN: CPT

## 2017-12-13 ENCOUNTER — APPOINTMENT (OUTPATIENT)
Dept: INFUSION THERAPY | Facility: CLINIC | Age: 57
End: 2017-12-13

## 2017-12-18 DIAGNOSIS — R11.2 NAUSEA WITH VOMITING, UNSPECIFIED: ICD-10-CM

## 2017-12-18 DIAGNOSIS — Z51.89 ENCOUNTER FOR OTHER SPECIFIED AFTERCARE: ICD-10-CM

## 2017-12-18 DIAGNOSIS — Z51.11 ENCOUNTER FOR ANTINEOPLASTIC CHEMOTHERAPY: ICD-10-CM

## 2017-12-26 ENCOUNTER — APPOINTMENT (OUTPATIENT)
Dept: HEMATOLOGY ONCOLOGY | Facility: CLINIC | Age: 57
End: 2017-12-26
Payer: COMMERCIAL

## 2017-12-26 ENCOUNTER — OUTPATIENT (OUTPATIENT)
Dept: OUTPATIENT SERVICES | Facility: HOSPITAL | Age: 57
LOS: 1 days | Discharge: HOME | End: 2017-12-26

## 2017-12-26 ENCOUNTER — RESULT REVIEW (OUTPATIENT)
Age: 57
End: 2017-12-26

## 2017-12-26 VITALS
BODY MASS INDEX: 34.48 KG/M2 | TEMPERATURE: 97.8 F | WEIGHT: 190.48 LBS | HEART RATE: 99 BPM | OXYGEN SATURATION: 97 % | DIASTOLIC BLOOD PRESSURE: 77 MMHG | SYSTOLIC BLOOD PRESSURE: 114 MMHG

## 2017-12-26 DIAGNOSIS — Z98.891 HISTORY OF UTERINE SCAR FROM PREVIOUS SURGERY: Chronic | ICD-10-CM

## 2017-12-26 DIAGNOSIS — Z98.890 OTHER SPECIFIED POSTPROCEDURAL STATES: Chronic | ICD-10-CM

## 2017-12-26 DIAGNOSIS — Z90.710 ACQUIRED ABSENCE OF BOTH CERVIX AND UTERUS: Chronic | ICD-10-CM

## 2017-12-26 DIAGNOSIS — C50.919 MALIGNANT NEOPLASM OF UNSPECIFIED SITE OF UNSPECIFIED FEMALE BREAST: ICD-10-CM

## 2017-12-26 LAB
BASOPHILS # BLD AUTO: 0 K/UL — SIGNIFICANT CHANGE UP (ref 0–0.2)
BASOPHILS NFR BLD AUTO: 0.4 % — SIGNIFICANT CHANGE UP (ref 0–2)
EOSINOPHIL # BLD AUTO: 0 K/UL — SIGNIFICANT CHANGE UP (ref 0–0.5)
EOSINOPHIL NFR BLD AUTO: 0.2 % — SIGNIFICANT CHANGE UP (ref 0–6)
HCT VFR BLD CALC: 28.1 % — LOW (ref 34.5–45)
HGB BLD-MCNC: 9.6 G/DL — LOW (ref 11.5–15.5)
LYMPHOCYTES # BLD AUTO: 1 K/UL — SIGNIFICANT CHANGE UP (ref 1–3.3)
LYMPHOCYTES # BLD AUTO: 12.7 % — LOW (ref 13–44)
MCHC RBC-ENTMCNC: 32.1 PG — SIGNIFICANT CHANGE UP (ref 27–34)
MCHC RBC-ENTMCNC: 34.3 GM/DL — SIGNIFICANT CHANGE UP (ref 32–36)
MCV RBC AUTO: 93.5 FL — SIGNIFICANT CHANGE UP (ref 80–100)
MONOCYTES # BLD AUTO: 0.4 K/UL — SIGNIFICANT CHANGE UP (ref 0–0.9)
MONOCYTES NFR BLD AUTO: 5.7 % — SIGNIFICANT CHANGE UP (ref 2–14)
NEUTROPHILS # BLD AUTO: 6.4 K/UL — SIGNIFICANT CHANGE UP (ref 1.8–7.4)
NEUTROPHILS NFR BLD AUTO: 81 % — HIGH (ref 43–77)
PLATELET # BLD AUTO: 151 K/UL — SIGNIFICANT CHANGE UP (ref 150–400)
RBC # BLD: 3 M/UL — LOW (ref 3.8–5.2)
RBC # FLD: 16.3 % — HIGH (ref 10.3–14.5)
WBC # BLD: 7.8 K/UL — SIGNIFICANT CHANGE UP (ref 3.8–10.5)
WBC # FLD AUTO: 7.8 K/UL — SIGNIFICANT CHANGE UP (ref 3.8–10.5)

## 2017-12-26 PROCEDURE — 99214 OFFICE O/P EST MOD 30 MIN: CPT

## 2017-12-27 LAB
ALBUMIN SERPL ELPH-MCNC: 3.8 G/DL
ALP BLD-CCNC: 86 U/L
ALT SERPL-CCNC: 25 U/L
ANION GAP SERPL CALC-SCNC: 14 MMOL/L
AST SERPL-CCNC: 23 U/L
BILIRUB SERPL-MCNC: 0.2 MG/DL
BUN SERPL-MCNC: 20 MG/DL
CALCIUM SERPL-MCNC: 8.6 MG/DL
CHLORIDE SERPL-SCNC: 103 MMOL/L
CO2 SERPL-SCNC: 27 MMOL/L
CREAT SERPL-MCNC: 0.84 MG/DL
FERRITIN SERPL-MCNC: 336 NG/ML
GLUCOSE SERPL-MCNC: 93 MG/DL
IRON SATN MFR SERPL: 39 %
IRON SERPL-MCNC: 107 UG/DL
POTASSIUM SERPL-SCNC: 3.9 MMOL/L
PROT SERPL-MCNC: 6.2 G/DL
SODIUM SERPL-SCNC: 144 MMOL/L
TIBC SERPL-MCNC: 276 UG/DL
UIBC SERPL-MCNC: 169 UG/DL

## 2018-01-03 ENCOUNTER — RESULT REVIEW (OUTPATIENT)
Age: 58
End: 2018-01-03

## 2018-01-03 ENCOUNTER — APPOINTMENT (OUTPATIENT)
Dept: INFUSION THERAPY | Facility: CLINIC | Age: 58
End: 2018-01-03

## 2018-01-03 ENCOUNTER — LABORATORY RESULT (OUTPATIENT)
Age: 58
End: 2018-01-03

## 2018-01-03 LAB
BASOPHILS # BLD AUTO: 0.1 K/UL — SIGNIFICANT CHANGE UP (ref 0–0.2)
BASOPHILS NFR BLD AUTO: 0.6 % — SIGNIFICANT CHANGE UP (ref 0–2)
EOSINOPHIL # BLD AUTO: 0 K/UL — SIGNIFICANT CHANGE UP (ref 0–0.5)
EOSINOPHIL NFR BLD AUTO: 0.2 % — SIGNIFICANT CHANGE UP (ref 0–6)
HCT VFR BLD CALC: 28.1 % — LOW (ref 34.5–45)
HGB BLD-MCNC: 10.1 G/DL — LOW (ref 11.5–15.5)
LYMPHOCYTES # BLD AUTO: 0.6 K/UL — LOW (ref 1–3.3)
LYMPHOCYTES # BLD AUTO: 6.2 % — LOW (ref 13–44)
MCHC RBC-ENTMCNC: 33.3 PG — SIGNIFICANT CHANGE UP (ref 27–34)
MCHC RBC-ENTMCNC: 35.8 GM/DL — SIGNIFICANT CHANGE UP (ref 32–36)
MCV RBC AUTO: 92.8 FL — SIGNIFICANT CHANGE UP (ref 80–100)
MONOCYTES # BLD AUTO: 0.6 K/UL — SIGNIFICANT CHANGE UP (ref 0–0.9)
MONOCYTES NFR BLD AUTO: 7.1 % — SIGNIFICANT CHANGE UP (ref 2–14)
NEUTROPHILS # BLD AUTO: 7.6 K/UL — HIGH (ref 1.8–7.4)
NEUTROPHILS NFR BLD AUTO: 86 % — HIGH (ref 43–77)
PLATELET # BLD AUTO: 154 K/UL — SIGNIFICANT CHANGE UP (ref 150–400)
RBC # BLD: 3.03 M/UL — LOW (ref 3.8–5.2)
RBC # FLD: 15.6 % — HIGH (ref 10.3–14.5)
WBC # BLD: 8.9 K/UL — SIGNIFICANT CHANGE UP (ref 3.8–10.5)
WBC # FLD AUTO: 8.9 K/UL — SIGNIFICANT CHANGE UP (ref 3.8–10.5)

## 2018-01-03 RX ORDER — CARBOPLATIN 50 MG
0 VIAL (EA) INTRAVENOUS
Qty: 0 | Refills: 0 | COMMUNITY

## 2018-01-03 RX ORDER — PERTUZUMAB 30 MG/ML
0 INJECTION, SOLUTION, CONCENTRATE INTRAVENOUS
Qty: 0 | Refills: 0 | COMMUNITY

## 2018-01-03 RX ORDER — DOCETAXEL 20 MG/ML
0 INJECTION, SOLUTION, CONCENTRATE INTRAVENOUS
Qty: 0 | Refills: 0 | COMMUNITY

## 2018-01-05 DIAGNOSIS — R11.2 NAUSEA WITH VOMITING, UNSPECIFIED: ICD-10-CM

## 2018-01-05 DIAGNOSIS — Z51.89 ENCOUNTER FOR OTHER SPECIFIED AFTERCARE: ICD-10-CM

## 2018-01-05 DIAGNOSIS — Z51.11 ENCOUNTER FOR ANTINEOPLASTIC CHEMOTHERAPY: ICD-10-CM

## 2018-01-10 ENCOUNTER — APPOINTMENT (OUTPATIENT)
Dept: CARDIOLOGY | Facility: CLINIC | Age: 58
End: 2018-01-10
Payer: COMMERCIAL

## 2018-01-10 PROCEDURE — 76376 3D RENDER W/INTRP POSTPROCES: CPT

## 2018-01-10 PROCEDURE — 0399T: CPT

## 2018-01-10 PROCEDURE — 93306 TTE W/DOPPLER COMPLETE: CPT

## 2018-01-16 ENCOUNTER — APPOINTMENT (OUTPATIENT)
Dept: SURGERY | Facility: CLINIC | Age: 58
End: 2018-01-16
Payer: COMMERCIAL

## 2018-01-16 ENCOUNTER — APPOINTMENT (OUTPATIENT)
Dept: HEMATOLOGY ONCOLOGY | Facility: CLINIC | Age: 58
End: 2018-01-16
Payer: COMMERCIAL

## 2018-01-16 ENCOUNTER — RESULT REVIEW (OUTPATIENT)
Age: 58
End: 2018-01-16

## 2018-01-16 VITALS
WEIGHT: 188.03 LBS | BODY MASS INDEX: 34.04 KG/M2 | SYSTOLIC BLOOD PRESSURE: 123 MMHG | TEMPERATURE: 98.4 F | HEART RATE: 100 BPM | DIASTOLIC BLOOD PRESSURE: 80 MMHG | OXYGEN SATURATION: 98 %

## 2018-01-16 VITALS
DIASTOLIC BLOOD PRESSURE: 80 MMHG | OXYGEN SATURATION: 98 % | RESPIRATION RATE: 18 BRPM | SYSTOLIC BLOOD PRESSURE: 123 MMHG | BODY MASS INDEX: 34.03 KG/M2 | TEMPERATURE: 98.4 F | WEIGHT: 188 LBS | HEART RATE: 100 BPM

## 2018-01-16 LAB
BASOPHILS # BLD AUTO: 0 K/UL — SIGNIFICANT CHANGE UP (ref 0–0.2)
BASOPHILS NFR BLD AUTO: 0.6 % — SIGNIFICANT CHANGE UP (ref 0–2)
EOSINOPHIL # BLD AUTO: 0 K/UL — SIGNIFICANT CHANGE UP (ref 0–0.5)
EOSINOPHIL NFR BLD AUTO: 0.1 % — SIGNIFICANT CHANGE UP (ref 0–6)
HCT VFR BLD CALC: 30.1 % — LOW (ref 34.5–45)
HGB BLD-MCNC: 10.2 G/DL — LOW (ref 11.5–15.5)
LYMPHOCYTES # BLD AUTO: 1.1 K/UL — SIGNIFICANT CHANGE UP (ref 1–3.3)
LYMPHOCYTES # BLD AUTO: 18 % — SIGNIFICANT CHANGE UP (ref 13–44)
MCHC RBC-ENTMCNC: 32.4 PG — SIGNIFICANT CHANGE UP (ref 27–34)
MCHC RBC-ENTMCNC: 33.8 GM/DL — SIGNIFICANT CHANGE UP (ref 32–36)
MCV RBC AUTO: 95.9 FL — SIGNIFICANT CHANGE UP (ref 80–100)
MONOCYTES # BLD AUTO: 0.4 K/UL — SIGNIFICANT CHANGE UP (ref 0–0.9)
MONOCYTES NFR BLD AUTO: 6.6 % — SIGNIFICANT CHANGE UP (ref 2–14)
NEUTROPHILS # BLD AUTO: 4.6 K/UL — SIGNIFICANT CHANGE UP (ref 1.8–7.4)
NEUTROPHILS NFR BLD AUTO: 74.7 % — SIGNIFICANT CHANGE UP (ref 43–77)
PLATELET # BLD AUTO: 161 K/UL — SIGNIFICANT CHANGE UP (ref 150–400)
RBC # BLD: 3.14 M/UL — LOW (ref 3.8–5.2)
RBC # FLD: 14.3 % — SIGNIFICANT CHANGE UP (ref 10.3–14.5)
WBC # BLD: 6.2 K/UL — SIGNIFICANT CHANGE UP (ref 3.8–10.5)
WBC # FLD AUTO: 6.2 K/UL — SIGNIFICANT CHANGE UP (ref 3.8–10.5)

## 2018-01-16 PROCEDURE — 99214 OFFICE O/P EST MOD 30 MIN: CPT

## 2018-01-16 RX ORDER — CHLORPHENIR/PHENYLEPH/ASPIRIN 2-7.8-325
TABLET, EFFERVESCENT ORAL
Refills: 0 | Status: DISCONTINUED | COMMUNITY
End: 2018-01-16

## 2018-01-17 LAB
ALBUMIN SERPL ELPH-MCNC: 4.3 G/DL
ALP BLD-CCNC: 97 U/L
ALT SERPL-CCNC: 38 U/L
ANION GAP SERPL CALC-SCNC: 17 MMOL/L
AST SERPL-CCNC: 28 U/L
BILIRUB SERPL-MCNC: 0.2 MG/DL
BUN SERPL-MCNC: 17 MG/DL
CALCIUM SERPL-MCNC: 8.3 MG/DL
CHLORIDE SERPL-SCNC: 101 MMOL/L
CO2 SERPL-SCNC: 27 MMOL/L
CREAT SERPL-MCNC: 0.8 MG/DL
MAGNESIUM SERPL-MCNC: 1 MG/DL
POTASSIUM SERPL-SCNC: 3.3 MMOL/L
PROT SERPL-MCNC: 6.8 G/DL
SODIUM SERPL-SCNC: 145 MMOL/L

## 2018-01-23 ENCOUNTER — APPOINTMENT (OUTPATIENT)
Dept: MRI IMAGING | Facility: CLINIC | Age: 58
End: 2018-01-23
Payer: COMMERCIAL

## 2018-01-23 ENCOUNTER — OUTPATIENT (OUTPATIENT)
Dept: OUTPATIENT SERVICES | Facility: HOSPITAL | Age: 58
LOS: 1 days | End: 2018-01-23
Payer: COMMERCIAL

## 2018-01-23 DIAGNOSIS — Z98.890 OTHER SPECIFIED POSTPROCEDURAL STATES: Chronic | ICD-10-CM

## 2018-01-23 DIAGNOSIS — C50.911 MALIGNANT NEOPLASM OF UNSPECIFIED SITE OF RIGHT FEMALE BREAST: ICD-10-CM

## 2018-01-23 DIAGNOSIS — Z98.891 HISTORY OF UTERINE SCAR FROM PREVIOUS SURGERY: Chronic | ICD-10-CM

## 2018-01-23 DIAGNOSIS — Z00.8 ENCOUNTER FOR OTHER GENERAL EXAMINATION: ICD-10-CM

## 2018-01-23 DIAGNOSIS — Z90.710 ACQUIRED ABSENCE OF BOTH CERVIX AND UTERUS: Chronic | ICD-10-CM

## 2018-01-23 PROCEDURE — 0159T: CPT | Mod: 26

## 2018-01-23 PROCEDURE — C8908: CPT

## 2018-01-23 PROCEDURE — C8937: CPT

## 2018-01-23 PROCEDURE — A9585: CPT

## 2018-01-23 PROCEDURE — 77059 MRI BREAST BILATERAL: CPT | Mod: 26

## 2018-01-23 PROCEDURE — 82565 ASSAY OF CREATININE: CPT

## 2018-01-26 ENCOUNTER — OUTPATIENT (OUTPATIENT)
Dept: OUTPATIENT SERVICES | Facility: HOSPITAL | Age: 58
LOS: 1 days | Discharge: HOME | End: 2018-01-26

## 2018-01-26 DIAGNOSIS — Z98.890 OTHER SPECIFIED POSTPROCEDURAL STATES: Chronic | ICD-10-CM

## 2018-01-26 DIAGNOSIS — Z90.710 ACQUIRED ABSENCE OF BOTH CERVIX AND UTERUS: Chronic | ICD-10-CM

## 2018-01-26 DIAGNOSIS — C50.919 MALIGNANT NEOPLASM OF UNSPECIFIED SITE OF UNSPECIFIED FEMALE BREAST: ICD-10-CM

## 2018-01-26 DIAGNOSIS — Z98.891 HISTORY OF UTERINE SCAR FROM PREVIOUS SURGERY: Chronic | ICD-10-CM

## 2018-02-01 ENCOUNTER — RESULT REVIEW (OUTPATIENT)
Age: 58
End: 2018-02-01

## 2018-02-01 ENCOUNTER — LABORATORY RESULT (OUTPATIENT)
Age: 58
End: 2018-02-01

## 2018-02-01 ENCOUNTER — APPOINTMENT (OUTPATIENT)
Dept: INFUSION THERAPY | Facility: CLINIC | Age: 58
End: 2018-02-01

## 2018-02-01 LAB
BASOPHILS # BLD AUTO: 0.1 K/UL — SIGNIFICANT CHANGE UP (ref 0–0.2)
BASOPHILS NFR BLD AUTO: 0.9 % — SIGNIFICANT CHANGE UP (ref 0–2)
EOSINOPHIL # BLD AUTO: 0.1 K/UL — SIGNIFICANT CHANGE UP (ref 0–0.5)
EOSINOPHIL NFR BLD AUTO: 1.2 % — SIGNIFICANT CHANGE UP (ref 0–6)
HCT VFR BLD CALC: 29.3 % — LOW (ref 34.5–45)
HGB BLD-MCNC: 9.7 G/DL — LOW (ref 11.5–15.5)
LYMPHOCYTES # BLD AUTO: 1.1 K/UL — SIGNIFICANT CHANGE UP (ref 1–3.3)
LYMPHOCYTES # BLD AUTO: 18.6 % — SIGNIFICANT CHANGE UP (ref 13–44)
MCHC RBC-ENTMCNC: 31.9 PG — SIGNIFICANT CHANGE UP (ref 27–34)
MCHC RBC-ENTMCNC: 33.2 GM/DL — SIGNIFICANT CHANGE UP (ref 32–36)
MCV RBC AUTO: 96.1 FL — SIGNIFICANT CHANGE UP (ref 80–100)
MONOCYTES # BLD AUTO: 0.5 K/UL — SIGNIFICANT CHANGE UP (ref 0–0.9)
MONOCYTES NFR BLD AUTO: 8.8 % — SIGNIFICANT CHANGE UP (ref 2–14)
NEUTROPHILS # BLD AUTO: 4.2 K/UL — SIGNIFICANT CHANGE UP (ref 1.8–7.4)
NEUTROPHILS NFR BLD AUTO: 70.6 % — SIGNIFICANT CHANGE UP (ref 43–77)
PLATELET # BLD AUTO: 390 K/UL — SIGNIFICANT CHANGE UP (ref 150–400)
RBC # BLD: 3.05 M/UL — LOW (ref 3.8–5.2)
RBC # FLD: 12.5 % — SIGNIFICANT CHANGE UP (ref 10.3–14.5)
WBC # BLD: 5.9 K/UL — SIGNIFICANT CHANGE UP (ref 3.8–10.5)
WBC # FLD AUTO: 5.9 K/UL — SIGNIFICANT CHANGE UP (ref 3.8–10.5)

## 2018-02-05 ENCOUNTER — APPOINTMENT (OUTPATIENT)
Dept: SURGERY | Facility: CLINIC | Age: 58
End: 2018-02-05
Payer: COMMERCIAL

## 2018-02-05 VITALS
WEIGHT: 188 LBS | BODY MASS INDEX: 33.31 KG/M2 | HEIGHT: 63 IN | DIASTOLIC BLOOD PRESSURE: 86 MMHG | OXYGEN SATURATION: 97 % | TEMPERATURE: 98.5 F | SYSTOLIC BLOOD PRESSURE: 137 MMHG | HEART RATE: 95 BPM

## 2018-02-05 PROCEDURE — 99214 OFFICE O/P EST MOD 30 MIN: CPT

## 2018-02-05 RX ORDER — POTASSIUM CHLORIDE 1500 MG/1
20 TABLET, FILM COATED, EXTENDED RELEASE ORAL
Qty: 40 | Refills: 0 | Status: DISCONTINUED | COMMUNITY
Start: 2017-11-09 | End: 2018-02-05

## 2018-02-23 ENCOUNTER — OUTPATIENT (OUTPATIENT)
Dept: OUTPATIENT SERVICES | Facility: HOSPITAL | Age: 58
LOS: 1 days | Discharge: HOME | End: 2018-02-23

## 2018-02-23 DIAGNOSIS — Z98.891 HISTORY OF UTERINE SCAR FROM PREVIOUS SURGERY: Chronic | ICD-10-CM

## 2018-02-23 DIAGNOSIS — Z98.890 OTHER SPECIFIED POSTPROCEDURAL STATES: Chronic | ICD-10-CM

## 2018-02-23 DIAGNOSIS — C50.919 MALIGNANT NEOPLASM OF UNSPECIFIED SITE OF UNSPECIFIED FEMALE BREAST: ICD-10-CM

## 2018-02-23 DIAGNOSIS — Z90.710 ACQUIRED ABSENCE OF BOTH CERVIX AND UTERUS: Chronic | ICD-10-CM

## 2018-02-27 ENCOUNTER — OUTPATIENT (OUTPATIENT)
Dept: OUTPATIENT SERVICES | Facility: HOSPITAL | Age: 58
LOS: 1 days | End: 2018-02-27
Payer: COMMERCIAL

## 2018-02-27 VITALS
SYSTOLIC BLOOD PRESSURE: 110 MMHG | WEIGHT: 192.02 LBS | OXYGEN SATURATION: 98 % | TEMPERATURE: 98 F | RESPIRATION RATE: 16 BRPM | DIASTOLIC BLOOD PRESSURE: 80 MMHG | HEIGHT: 62 IN | HEART RATE: 72 BPM

## 2018-02-27 DIAGNOSIS — Z98.891 HISTORY OF UTERINE SCAR FROM PREVIOUS SURGERY: Chronic | ICD-10-CM

## 2018-02-27 DIAGNOSIS — C50.911 MALIGNANT NEOPLASM OF UNSPECIFIED SITE OF RIGHT FEMALE BREAST: ICD-10-CM

## 2018-02-27 DIAGNOSIS — C50.919 MALIGNANT NEOPLASM OF UNSPECIFIED SITE OF UNSPECIFIED FEMALE BREAST: ICD-10-CM

## 2018-02-27 DIAGNOSIS — Z95.828 PRESENCE OF OTHER VASCULAR IMPLANTS AND GRAFTS: Chronic | ICD-10-CM

## 2018-02-27 DIAGNOSIS — Z98.890 OTHER SPECIFIED POSTPROCEDURAL STATES: Chronic | ICD-10-CM

## 2018-02-27 DIAGNOSIS — Z90.710 ACQUIRED ABSENCE OF BOTH CERVIX AND UTERUS: Chronic | ICD-10-CM

## 2018-02-27 DIAGNOSIS — I10 ESSENTIAL (PRIMARY) HYPERTENSION: ICD-10-CM

## 2018-02-27 LAB
ALBUMIN SERPL ELPH-MCNC: 4.4 G/DL — SIGNIFICANT CHANGE UP (ref 3.3–5)
ALP SERPL-CCNC: 82 U/L — SIGNIFICANT CHANGE UP (ref 40–120)
ALT FLD-CCNC: 69 U/L — HIGH (ref 4–33)
AST SERPL-CCNC: 57 U/L — HIGH (ref 4–32)
BILIRUB SERPL-MCNC: 0.4 MG/DL — SIGNIFICANT CHANGE UP (ref 0.2–1.2)
BLD GP AB SCN SERPL QL: NEGATIVE — SIGNIFICANT CHANGE UP
BUN SERPL-MCNC: 19 MG/DL — SIGNIFICANT CHANGE UP (ref 7–23)
CALCIUM SERPL-MCNC: 9.1 MG/DL — SIGNIFICANT CHANGE UP (ref 8.4–10.5)
CHLORIDE SERPL-SCNC: 99 MMOL/L — SIGNIFICANT CHANGE UP (ref 98–107)
CO2 SERPL-SCNC: 29 MMOL/L — SIGNIFICANT CHANGE UP (ref 22–31)
CREAT SERPL-MCNC: 0.75 MG/DL — SIGNIFICANT CHANGE UP (ref 0.5–1.3)
GLUCOSE SERPL-MCNC: 90 MG/DL — SIGNIFICANT CHANGE UP (ref 70–99)
HCT VFR BLD CALC: 35.3 % — SIGNIFICANT CHANGE UP (ref 34.5–45)
HGB BLD-MCNC: 12.2 G/DL — SIGNIFICANT CHANGE UP (ref 11.5–15.5)
MCHC RBC-ENTMCNC: 31.6 PG — SIGNIFICANT CHANGE UP (ref 27–34)
MCHC RBC-ENTMCNC: 34.6 % — SIGNIFICANT CHANGE UP (ref 32–36)
MCV RBC AUTO: 91.5 FL — SIGNIFICANT CHANGE UP (ref 80–100)
NRBC # FLD: 0 — SIGNIFICANT CHANGE UP
PLATELET # BLD AUTO: 250 K/UL — SIGNIFICANT CHANGE UP (ref 150–400)
PMV BLD: 9.9 FL — SIGNIFICANT CHANGE UP (ref 7–13)
POTASSIUM SERPL-MCNC: 3.4 MMOL/L — LOW (ref 3.5–5.3)
POTASSIUM SERPL-SCNC: 3.4 MMOL/L — LOW (ref 3.5–5.3)
PROT SERPL-MCNC: 7.1 G/DL — SIGNIFICANT CHANGE UP (ref 6–8.3)
RBC # BLD: 3.86 M/UL — SIGNIFICANT CHANGE UP (ref 3.8–5.2)
RBC # FLD: 11.9 % — SIGNIFICANT CHANGE UP (ref 10.3–14.5)
RH IG SCN BLD-IMP: POSITIVE — SIGNIFICANT CHANGE UP
SODIUM SERPL-SCNC: 143 MMOL/L — SIGNIFICANT CHANGE UP (ref 135–145)
WBC # BLD: 4.41 K/UL — SIGNIFICANT CHANGE UP (ref 3.8–10.5)
WBC # FLD AUTO: 4.41 K/UL — SIGNIFICANT CHANGE UP (ref 3.8–10.5)

## 2018-02-27 PROCEDURE — 93010 ELECTROCARDIOGRAM REPORT: CPT

## 2018-02-27 RX ORDER — DEXAMETHASONE 0.5 MG/5ML
1 ELIXIR ORAL
Qty: 0 | Refills: 0 | COMMUNITY

## 2018-02-27 RX ORDER — OLMESARTAN MEDOXOMIL-HYDROCHLOROTHIAZIDE 25; 40 MG/1; MG/1
1 TABLET, FILM COATED ORAL
Qty: 0 | Refills: 0 | COMMUNITY

## 2018-02-27 RX ORDER — TRASTUZUMAB-DKST 420 MG/20ML
0 INJECTION, POWDER, LYOPHILIZED, FOR SOLUTION INTRAVENOUS
Qty: 0 | Refills: 0 | COMMUNITY

## 2018-02-27 RX ORDER — ACETAMINOPHEN 500 MG
2 TABLET ORAL
Qty: 0 | Refills: 0 | COMMUNITY

## 2018-02-27 RX ORDER — SODIUM CHLORIDE 9 MG/ML
1000 INJECTION, SOLUTION INTRAVENOUS
Qty: 0 | Refills: 0 | Status: DISCONTINUED | OUTPATIENT
Start: 2018-03-14 | End: 2018-03-14

## 2018-02-27 RX ORDER — CHOLECALCIFEROL (VITAMIN D3) 125 MCG
2 CAPSULE ORAL
Qty: 0 | Refills: 0 | COMMUNITY

## 2018-02-27 NOTE — H&P PST ADULT - RS GEN PE MLT RESP DETAILS PC
airway patent/breath sounds equal/no intercostal retractions/no rales/respirations non-labored/good air movement/no rhonchi/clear to auscultation bilaterally/no wheezes/no chest wall tenderness

## 2018-02-27 NOTE — H&P PST ADULT - ATTENDING COMMENTS
Patient understands she will undergo right sentinel node biopsy possible dissection and bilateral mastectomy

## 2018-02-27 NOTE — H&P PST ADULT - NEGATIVE OPHTHALMOLOGIC SYMPTOMS
no discharge R/no pain L/no pain R/no loss of vision L/no loss of vision R/no blurred vision L/no diplopia/no discharge L/no blurred vision R

## 2018-02-27 NOTE — H&P PST ADULT - NEGATIVE NEUROLOGICAL SYMPTOMS
no focal seizures/no tremors/no vertigo/no loss of sensation/no generalized seizures/no weakness/no syncope/no headache/no transient paralysis/no paresthesias/no difficulty walking

## 2018-02-27 NOTE — H&P PST ADULT - MUSCULOSKELETAL
details… detailed exam no calf tenderness/no joint erythema/no joint warmth/no joint swelling/ROM intact/normal strength

## 2018-02-27 NOTE — H&P PST ADULT - PROBLEM SELECTOR PLAN 1
Pt is scheduled for bilateral mastectomy and pina reconstruction, axillary dissection for 3/14/18. Preop instructions, pepcid, surgical scrub provided. Pt stated understanding. Heme/onc note in chart. Comparison ekg from 7/17/17 in chart, Discussed with Dr Gr Anesthesiologist.

## 2018-02-27 NOTE — H&P PST ADULT - HISTORY OF PRESENT ILLNESS
57 year old female presents to presurgical testing with diagnosis of malignant neoplasm of unspecified site of right female breast scheduled for bilateral mastectomy and pina reconstruction, axillary dissection for 3/14/18. Pt was diagnosed with right breast cancer after routine mammogram in 8/2017, completed chemotherapy in 1/2018.

## 2018-02-27 NOTE — H&P PST ADULT - PSH
History of  section  1994  History of hysterectomy  2013  History of oral surgery    Port-a-cath in place  chemoport left chest wall 2017  Status post endometrial ablation  2013

## 2018-03-01 ENCOUNTER — LABORATORY RESULT (OUTPATIENT)
Age: 58
End: 2018-03-01

## 2018-03-01 ENCOUNTER — RESULT REVIEW (OUTPATIENT)
Age: 58
End: 2018-03-01

## 2018-03-01 ENCOUNTER — APPOINTMENT (OUTPATIENT)
Dept: INFUSION THERAPY | Facility: CLINIC | Age: 58
End: 2018-03-01

## 2018-03-01 LAB
BASOPHILS # BLD AUTO: 0 K/UL — SIGNIFICANT CHANGE UP (ref 0–0.2)
BASOPHILS NFR BLD AUTO: 0.9 % — SIGNIFICANT CHANGE UP (ref 0–2)
EOSINOPHIL # BLD AUTO: 0.2 K/UL — SIGNIFICANT CHANGE UP (ref 0–0.5)
EOSINOPHIL NFR BLD AUTO: 4.9 % — SIGNIFICANT CHANGE UP (ref 0–6)
HCT VFR BLD CALC: 35.1 % — SIGNIFICANT CHANGE UP (ref 34.5–45)
HGB BLD-MCNC: 12.1 G/DL — SIGNIFICANT CHANGE UP (ref 11.5–15.5)
LYMPHOCYTES # BLD AUTO: 1 K/UL — SIGNIFICANT CHANGE UP (ref 1–3.3)
LYMPHOCYTES # BLD AUTO: 19.5 % — SIGNIFICANT CHANGE UP (ref 13–44)
MCHC RBC-ENTMCNC: 31.4 PG — SIGNIFICANT CHANGE UP (ref 27–34)
MCHC RBC-ENTMCNC: 34.6 GM/DL — SIGNIFICANT CHANGE UP (ref 32–36)
MCV RBC AUTO: 90.8 FL — SIGNIFICANT CHANGE UP (ref 80–100)
MONOCYTES # BLD AUTO: 0.5 K/UL — SIGNIFICANT CHANGE UP (ref 0–0.9)
MONOCYTES NFR BLD AUTO: 10.2 % — SIGNIFICANT CHANGE UP (ref 2–14)
NEUTROPHILS # BLD AUTO: 3.2 K/UL — SIGNIFICANT CHANGE UP (ref 1.8–7.4)
NEUTROPHILS NFR BLD AUTO: 64.4 % — SIGNIFICANT CHANGE UP (ref 43–77)
PLATELET # BLD AUTO: 219 K/UL — SIGNIFICANT CHANGE UP (ref 150–400)
RBC # BLD: 3.86 M/UL — SIGNIFICANT CHANGE UP (ref 3.8–5.2)
RBC # FLD: 11.3 % — SIGNIFICANT CHANGE UP (ref 10.3–14.5)
WBC # BLD: 5 K/UL — SIGNIFICANT CHANGE UP (ref 3.8–10.5)
WBC # FLD AUTO: 5 K/UL — SIGNIFICANT CHANGE UP (ref 3.8–10.5)

## 2018-03-13 ENCOUNTER — TRANSCRIPTION ENCOUNTER (OUTPATIENT)
Age: 58
End: 2018-03-13

## 2018-03-14 ENCOUNTER — INPATIENT (INPATIENT)
Facility: HOSPITAL | Age: 58
LOS: 3 days | Discharge: HOME HEALTH | End: 2018-03-18
Attending: PLASTIC SURGERY | Admitting: PLASTIC SURGERY
Payer: COMMERCIAL

## 2018-03-14 ENCOUNTER — RESULT REVIEW (OUTPATIENT)
Age: 58
End: 2018-03-14

## 2018-03-14 ENCOUNTER — APPOINTMENT (OUTPATIENT)
Dept: SURGICAL ONCOLOGY | Facility: HOSPITAL | Age: 58
End: 2018-03-14

## 2018-03-14 ENCOUNTER — APPOINTMENT (OUTPATIENT)
Dept: SURGERY | Facility: HOSPITAL | Age: 58
End: 2018-03-14

## 2018-03-14 VITALS
HEIGHT: 62 IN | HEART RATE: 88 BPM | TEMPERATURE: 98 F | DIASTOLIC BLOOD PRESSURE: 82 MMHG | RESPIRATION RATE: 16 BRPM | SYSTOLIC BLOOD PRESSURE: 141 MMHG | OXYGEN SATURATION: 100 % | WEIGHT: 192.02 LBS

## 2018-03-14 DIAGNOSIS — Z98.890 OTHER SPECIFIED POSTPROCEDURAL STATES: Chronic | ICD-10-CM

## 2018-03-14 DIAGNOSIS — Z95.828 PRESENCE OF OTHER VASCULAR IMPLANTS AND GRAFTS: Chronic | ICD-10-CM

## 2018-03-14 DIAGNOSIS — C50.911 MALIGNANT NEOPLASM OF UNSPECIFIED SITE OF RIGHT FEMALE BREAST: ICD-10-CM

## 2018-03-14 DIAGNOSIS — Z90.710 ACQUIRED ABSENCE OF BOTH CERVIX AND UTERUS: Chronic | ICD-10-CM

## 2018-03-14 DIAGNOSIS — Z98.891 HISTORY OF UTERINE SCAR FROM PREVIOUS SURGERY: Chronic | ICD-10-CM

## 2018-03-14 LAB
BUN SERPL-MCNC: 16 MG/DL — SIGNIFICANT CHANGE UP (ref 7–23)
CALCIUM SERPL-MCNC: 7.5 MG/DL — LOW (ref 8.4–10.5)
CHLORIDE SERPL-SCNC: 101 MMOL/L — SIGNIFICANT CHANGE UP (ref 98–107)
CO2 SERPL-SCNC: 22 MMOL/L — SIGNIFICANT CHANGE UP (ref 22–31)
CREAT SERPL-MCNC: 0.88 MG/DL — SIGNIFICANT CHANGE UP (ref 0.5–1.3)
GLUCOSE SERPL-MCNC: 178 MG/DL — HIGH (ref 70–99)
HCT VFR BLD CALC: 20.5 % — CRITICAL LOW (ref 34.5–45)
HCT VFR BLD CALC: 20.5 % — CRITICAL LOW (ref 34.5–45)
HGB BLD-MCNC: 7 G/DL — CRITICAL LOW (ref 11.5–15.5)
HGB BLD-MCNC: 7 G/DL — CRITICAL LOW (ref 11.5–15.5)
MCHC RBC-ENTMCNC: 31.1 PG — SIGNIFICANT CHANGE UP (ref 27–34)
MCHC RBC-ENTMCNC: 34.1 % — SIGNIFICANT CHANGE UP (ref 32–36)
MCV RBC AUTO: 91.1 FL — SIGNIFICANT CHANGE UP (ref 80–100)
NRBC # FLD: 0 — SIGNIFICANT CHANGE UP
PLATELET # BLD AUTO: 182 K/UL — SIGNIFICANT CHANGE UP (ref 150–400)
PMV BLD: 10.3 FL — SIGNIFICANT CHANGE UP (ref 7–13)
POTASSIUM BLDV-SCNC: 3.4 MMOL/L — SIGNIFICANT CHANGE UP (ref 3.4–4.5)
POTASSIUM SERPL-MCNC: 3.3 MMOL/L — LOW (ref 3.5–5.3)
POTASSIUM SERPL-SCNC: 3.3 MMOL/L — LOW (ref 3.5–5.3)
RBC # BLD: 2.25 M/UL — LOW (ref 3.8–5.2)
RBC # FLD: 11.9 % — SIGNIFICANT CHANGE UP (ref 10.3–14.5)
RH IG SCN BLD-IMP: POSITIVE — SIGNIFICANT CHANGE UP
SODIUM SERPL-SCNC: 143 MMOL/L — SIGNIFICANT CHANGE UP (ref 135–145)
WBC # BLD: 10.77 K/UL — HIGH (ref 3.8–10.5)
WBC # FLD AUTO: 10.77 K/UL — HIGH (ref 3.8–10.5)

## 2018-03-14 PROCEDURE — 88307 TISSUE EXAM BY PATHOLOGIST: CPT | Mod: 26

## 2018-03-14 PROCEDURE — 88331 PATH CONSLTJ SURG 1 BLK 1SPC: CPT | Mod: 26

## 2018-03-14 PROCEDURE — 88341 IMHCHEM/IMCYTCHM EA ADD ANTB: CPT | Mod: 26

## 2018-03-14 PROCEDURE — 19303 MAST SIMPLE COMPLETE: CPT | Mod: LT

## 2018-03-14 PROCEDURE — 19305 MAST RADICAL: CPT | Mod: RT

## 2018-03-14 PROCEDURE — 88342 IMHCHEM/IMCYTCHM 1ST ANTB: CPT | Mod: 26

## 2018-03-14 RX ORDER — OXYCODONE HYDROCHLORIDE 5 MG/1
5 TABLET ORAL EVERY 4 HOURS
Qty: 0 | Refills: 0 | Status: DISCONTINUED | OUTPATIENT
Start: 2018-03-14 | End: 2018-03-18

## 2018-03-14 RX ORDER — ACETAMINOPHEN 500 MG
1000 TABLET ORAL ONCE
Qty: 0 | Refills: 0 | Status: COMPLETED | OUTPATIENT
Start: 2018-03-15 | End: 2018-03-14

## 2018-03-14 RX ORDER — DOCUSATE SODIUM 100 MG
100 CAPSULE ORAL THREE TIMES A DAY
Qty: 0 | Refills: 0 | Status: DISCONTINUED | OUTPATIENT
Start: 2018-03-14 | End: 2018-03-18

## 2018-03-14 RX ORDER — KETOROLAC TROMETHAMINE 30 MG/ML
15 SYRINGE (ML) INJECTION EVERY 6 HOURS
Qty: 0 | Refills: 0 | Status: DISCONTINUED | OUTPATIENT
Start: 2018-03-14 | End: 2018-03-15

## 2018-03-14 RX ORDER — HYDROMORPHONE HYDROCHLORIDE 2 MG/ML
0.5 INJECTION INTRAMUSCULAR; INTRAVENOUS; SUBCUTANEOUS EVERY 4 HOURS
Qty: 0 | Refills: 0 | Status: DISCONTINUED | OUTPATIENT
Start: 2018-03-14 | End: 2018-03-18

## 2018-03-14 RX ORDER — ENOXAPARIN SODIUM 100 MG/ML
40 INJECTION SUBCUTANEOUS EVERY 24 HOURS
Qty: 0 | Refills: 0 | Status: DISCONTINUED | OUTPATIENT
Start: 2018-03-15 | End: 2018-03-18

## 2018-03-14 RX ORDER — ACETAMINOPHEN 500 MG
1000 TABLET ORAL ONCE
Qty: 0 | Refills: 0 | Status: COMPLETED | OUTPATIENT
Start: 2018-03-15 | End: 2018-03-15

## 2018-03-14 RX ORDER — ACETAMINOPHEN 500 MG
1000 TABLET ORAL ONCE
Qty: 0 | Refills: 0 | Status: COMPLETED | OUTPATIENT
Start: 2018-03-14 | End: 2018-03-15

## 2018-03-14 RX ORDER — SODIUM CHLORIDE 9 MG/ML
1000 INJECTION, SOLUTION INTRAVENOUS
Qty: 0 | Refills: 0 | Status: DISCONTINUED | OUTPATIENT
Start: 2018-03-14 | End: 2018-03-16

## 2018-03-14 RX ORDER — CEFAZOLIN SODIUM 1 G
2000 VIAL (EA) INJECTION EVERY 8 HOURS
Qty: 0 | Refills: 0 | Status: COMPLETED | OUTPATIENT
Start: 2018-03-14 | End: 2018-03-15

## 2018-03-14 RX ADMIN — Medication 1000 MILLIGRAM(S): at 23:50

## 2018-03-14 RX ADMIN — HYDROMORPHONE HYDROCHLORIDE 0.5 MILLIGRAM(S): 2 INJECTION INTRAMUSCULAR; INTRAVENOUS; SUBCUTANEOUS at 20:54

## 2018-03-14 RX ADMIN — HYDROMORPHONE HYDROCHLORIDE 0.5 MILLIGRAM(S): 2 INJECTION INTRAMUSCULAR; INTRAVENOUS; SUBCUTANEOUS at 20:45

## 2018-03-14 RX ADMIN — Medication 400 MILLIGRAM(S): at 23:21

## 2018-03-14 RX ADMIN — SODIUM CHLORIDE 125 MILLILITER(S): 9 INJECTION, SOLUTION INTRAVENOUS at 20:54

## 2018-03-14 NOTE — BRIEF OPERATIVE NOTE - PROCEDURE
<<-----Click on this checkbox to enter Procedure Mastectomy, with sentinel node biopsy, simple  03/14/2018    Active  [id number]

## 2018-03-14 NOTE — BRIEF OPERATIVE NOTE - PROCEDURE
<<-----Click on this checkbox to enter Procedure Breast reconstruction with deep inferior epigastric artery  (BUNNY) free flap  03/14/2018    Active  [email or web address]

## 2018-03-14 NOTE — BRIEF OPERATIVE NOTE - OPERATION/FINDINGS
Right side mastectomy with sentinel lymph node biopsy. Left side risk reduction (prophylactic) mastectomy. Please see plastics note for reconstruction portion.
See operative report

## 2018-03-14 NOTE — BRIEF OPERATIVE NOTE - PRE-OP DX
Breast cancer, right breast  03/14/2018    Active  Tiago Mcclelland
Breast cancer, right breast  03/14/2018    Active  Tiago Mcclelland

## 2018-03-14 NOTE — PROGRESS NOTE ADULT - SUBJECTIVE AND OBJECTIVE BOX
[facility] GENERAL SURGERY POST-OP NOTE    SUBJECTIVE: Pt seen and evaluated at bedside. AVSS, pt receiving 1unit pRBC's. Resting comfortably in bed. Pain controlled. Denies nausea/vomiting, CP, palpitations, SOB, lightheaded, dizziness. Voiding.     Objective:  Gen: NAD, AAOx3  Pulm: b/l chest rise, No increased work of breathing  Chest: incisions c/d/i, flaps appear pink and viable, sensors in place, JPx6  Card: RRR  Abd: soft, appropriately tender, ND, Dressings c/d/i    Vital Signs Last 24 Hrs  T(C): 36.7 (14 Mar 2018 20:00), Max: 36.8 (14 Mar 2018 19:15)  T(F): 98.1 (14 Mar 2018 20:00), Max: 98.3 (14 Mar 2018 19:15)  HR: 85 (14 Mar 2018 20:00) (84 - 92)  BP: 99/56 (14 Mar 2018 20:00) (86/55 - 141/82)  BP(mean): 95 (14 Mar 2018 17:45) (95 - 95)  RR: 16 (14 Mar 2018 20:00) (11 - 20)  SpO2: 99% (14 Mar 2018 20:00) (94% - 100%)  I&O's Summary    14 Mar 2018 07:01  -  14 Mar 2018 20:14  --------------------------------------------------------  IN: 1050 mL / OUT: 718 mL / NET: 332 mL      I&O's Detail    14 Mar 2018 07:01  -  14 Mar 2018 20:14  --------------------------------------------------------  IN:    lactated ringers.: 750 mL    Packed Red Blood Cells: 300 mL  Total IN: 1050 mL    OUT:    Bulb: 39 mL    Bulb: 14 mL    Bulb: 139 mL    Bulb: 30 mL    Bulb: 16 mL    Bulb: 45 mL    Indwelling Catheter - Urethral: 435 mL  Total OUT: 718 mL    Total NET: 332 mL          MEDICATIONS  (STANDING):  acetaminophen  IVPB. 1000 milliGRAM(s) IV Intermittent once  ceFAZolin   IVPB 2000 milliGRAM(s) IV Intermittent every 8 hours  docusate sodium 100 milliGRAM(s) Oral three times a day  ketorolac   Injectable 15 milliGRAM(s) IV Push every 6 hours  lactated ringers. 1000 milliLiter(s) (125 mL/Hr) IV Continuous <Continuous>    MEDICATIONS  (PRN):  HYDROmorphone  Injectable 0.5 milliGRAM(s) IV Push every 4 hours PRN Severe Pain (7 - 10)  oxyCODONE    IR 5 milliGRAM(s) Oral every 4 hours PRN Moderate Pain (4 - 6)      LABS:                        7.0    10.77 )-----------( 182      ( 14 Mar 2018 16:45 )             20.5     03-14    143  |  101  |  16  ----------------------------<  178<H>  3.3<L>   |  22  |  0.88    Ca    7.5<L>      14 Mar 2018 16:45            RADIOLOGY & ADDITIONAL STUDIES:      A/P: 57y Female s/p b/l mastectomy with BUNNY recon. HD stable.     - Pain control  - Strict I/O's  - monitor flap saturations  - OOB/DVT ppx  - f/u AM labs  - f/u vitals after 1 unit pRBC  - care per plastics

## 2018-03-15 LAB
BUN SERPL-MCNC: 19 MG/DL — SIGNIFICANT CHANGE UP (ref 7–23)
CALCIUM SERPL-MCNC: 7.8 MG/DL — LOW (ref 8.4–10.5)
CHLORIDE SERPL-SCNC: 101 MMOL/L — SIGNIFICANT CHANGE UP (ref 98–107)
CO2 SERPL-SCNC: 27 MMOL/L — SIGNIFICANT CHANGE UP (ref 22–31)
CREAT SERPL-MCNC: 1.02 MG/DL — SIGNIFICANT CHANGE UP (ref 0.5–1.3)
GLUCOSE SERPL-MCNC: 125 MG/DL — HIGH (ref 70–99)
HCT VFR BLD CALC: 29 % — LOW (ref 34.5–45)
HGB BLD-MCNC: 9.5 G/DL — LOW (ref 11.5–15.5)
MCHC RBC-ENTMCNC: 29.1 PG — SIGNIFICANT CHANGE UP (ref 27–34)
MCHC RBC-ENTMCNC: 32.8 % — SIGNIFICANT CHANGE UP (ref 32–36)
MCV RBC AUTO: 89 FL — SIGNIFICANT CHANGE UP (ref 80–100)
NRBC # FLD: 0 — SIGNIFICANT CHANGE UP
PLATELET # BLD AUTO: 175 K/UL — SIGNIFICANT CHANGE UP (ref 150–400)
PMV BLD: 10.2 FL — SIGNIFICANT CHANGE UP (ref 7–13)
POTASSIUM SERPL-MCNC: 4 MMOL/L — SIGNIFICANT CHANGE UP (ref 3.5–5.3)
POTASSIUM SERPL-SCNC: 4 MMOL/L — SIGNIFICANT CHANGE UP (ref 3.5–5.3)
RBC # BLD: 3.26 M/UL — LOW (ref 3.8–5.2)
RBC # FLD: 13.2 % — SIGNIFICANT CHANGE UP (ref 10.3–14.5)
SODIUM SERPL-SCNC: 142 MMOL/L — SIGNIFICANT CHANGE UP (ref 135–145)
WBC # BLD: 9.49 K/UL — SIGNIFICANT CHANGE UP (ref 3.8–10.5)
WBC # FLD AUTO: 9.49 K/UL — SIGNIFICANT CHANGE UP (ref 3.8–10.5)

## 2018-03-15 RX ORDER — SODIUM CHLORIDE 9 MG/ML
1000 INJECTION, SOLUTION INTRAVENOUS ONCE
Qty: 0 | Refills: 0 | Status: COMPLETED | OUTPATIENT
Start: 2018-03-15 | End: 2018-03-15

## 2018-03-15 RX ORDER — CEFAZOLIN SODIUM 1 G
1000 VIAL (EA) INJECTION EVERY 8 HOURS
Qty: 0 | Refills: 0 | Status: COMPLETED | OUTPATIENT
Start: 2018-03-15 | End: 2018-03-18

## 2018-03-15 RX ORDER — CEFAZOLIN SODIUM 1 G
1000 VIAL (EA) INJECTION EVERY 8 HOURS
Qty: 0 | Refills: 0 | Status: DISCONTINUED | OUTPATIENT
Start: 2018-03-15 | End: 2018-03-15

## 2018-03-15 RX ADMIN — Medication 100 MILLIGRAM(S): at 14:27

## 2018-03-15 RX ADMIN — Medication 15 MILLIGRAM(S): at 07:47

## 2018-03-15 RX ADMIN — Medication 100 MILLIGRAM(S): at 14:57

## 2018-03-15 RX ADMIN — Medication 100 MILLIGRAM(S): at 22:04

## 2018-03-15 RX ADMIN — Medication 1000 MILLIGRAM(S): at 08:30

## 2018-03-15 RX ADMIN — Medication 400 MILLIGRAM(S): at 08:00

## 2018-03-15 RX ADMIN — OXYCODONE HYDROCHLORIDE 5 MILLIGRAM(S): 5 TABLET ORAL at 20:27

## 2018-03-15 RX ADMIN — SODIUM CHLORIDE 2000 MILLILITER(S): 9 INJECTION, SOLUTION INTRAVENOUS at 06:31

## 2018-03-15 RX ADMIN — Medication 400 MILLIGRAM(S): at 14:27

## 2018-03-15 RX ADMIN — SODIUM CHLORIDE 2000 MILLILITER(S): 9 INJECTION, SOLUTION INTRAVENOUS at 12:23

## 2018-03-15 RX ADMIN — Medication 15 MILLIGRAM(S): at 01:18

## 2018-03-15 RX ADMIN — Medication 15 MILLIGRAM(S): at 01:53

## 2018-03-15 RX ADMIN — Medication 100 MILLIGRAM(S): at 07:47

## 2018-03-15 RX ADMIN — Medication 100 MILLIGRAM(S): at 01:18

## 2018-03-15 RX ADMIN — Medication 15 MILLIGRAM(S): at 07:05

## 2018-03-15 RX ADMIN — SODIUM CHLORIDE 75 MILLILITER(S): 9 INJECTION, SOLUTION INTRAVENOUS at 12:23

## 2018-03-15 RX ADMIN — OXYCODONE HYDROCHLORIDE 5 MILLIGRAM(S): 5 TABLET ORAL at 20:45

## 2018-03-15 RX ADMIN — ENOXAPARIN SODIUM 40 MILLIGRAM(S): 100 INJECTION SUBCUTANEOUS at 10:21

## 2018-03-15 NOTE — PROGRESS NOTE ADULT - SUBJECTIVE AND OBJECTIVE BOX
2 prbc overnight  NAD  Afebrile, VSS  ViOptix 65/86  Bilateral breast free flap viable, closures intact.  Abdomen flat, closure intact.  Drains serosang.  Hb 9.5 from 7  Stable POD 1  T(C): 37.3 (03-15-18 @ 06:00), Max: 37.3 (03-15-18 @ 06:00)  HR: 94 (03-15-18 @ 07:00) (82 - 94)  BP: 100/59 (03-15-18 @ 07:00) (86/55 - 105/70)  RR: 20 (03-15-18 @ 07:00) (11 - 33)  SpO2: 100% (03-15-18 @ 07:00) (94% - 100%)  Wt(kg): --    03-14 @ 07:01  -  03-15 @ 07:00  --------------------------------------------------------  IN: 3650 mL / OUT: 1506 mL / NET: 2144 mL      Transfer to floor  Advance diet  OOB to chair  Continue ViOptix

## 2018-03-15 NOTE — PROGRESS NOTE ADULT - SUBJECTIVE AND OBJECTIVE BOX
SUBJECTIVE:  Doing well.   No overnight events.   Pain controlled.  Received x2 units RBC overnight for hematocrit 20.    OBJECTIVE:     ** VITAL SIGNS / I&O's **    T(C): 37 (03-15-18 @ 02:00), Max: 37 (03-15-18 @ 02:00)  T(F): 98.6 (03-15-18 @ 02:00), Max: 98.6 (03-15-18 @ 02:00)  HR: 82 (03-15-18 @ 03:00) (82 - 92)  BP: 92/60 (03-15-18 @ 03:00) (86/55 - 141/82)  RR: 20 (03-15-18 @ 03:00) (11 - 20)  SpO2: 100% (03-15-18 @ 03:00) (94% - 100%)      14 Mar 2018 07:01  -  15 Mar 2018 05:45  --------------------------------------------------------  IN:    IV PiggyBack: 50 mL    lactated ringers.: 1625 mL    Packed Red Blood Cells: 600 mL  Total IN: 2275 mL    OUT:    Bulb: 45 mL    Bulb: 53 mL    Bulb: 23 mL    Bulb: 161 mL    Bulb: 32 mL    Bulb: 36 mL    Indwelling Catheter - Urethral: 1000 mL  Total OUT: 1350 mL    Total NET: 925 mL          ** PHYSICAL EXAM **    -- CONSTITUTIONAL: AOx3. NAD.   -- RIGHT BREAST / FLAP: Mastectomy skin flap ecchymosis, stable. No collections. Flap soft and pink with 2-3 second capillary refill. Vioptix: 60s. Drains serosanguinous.  -- LEFT BREAST / FLAP: Mastectomy skin flap ecchymosis, stable. No collections. Flap soft and pink with 2-3 second capillary refill. Vioptix: 70s%. Drains serosanguinous.  -- CARDIOVASCULAR: Regular rate and rhythm. S1, S2.  -- RESPIRATORY: Bilateral breath sounds.   -- ABDOMEN: Soft. No collections. Incision intact. (+) polo-incisional ecchymosis. Umbilicus viable. Drains serosanguinous.    ** LABS**                 7.0    10.77  )----------(  182       ( 14 Mar 2018 16:45 )               20.5      143    |  101    |  16     ----------------------------<  178        ( 14 Mar 2018 16:45 )  3.3     |  22     |  0.88     Ca    7.5        ( 14 Mar 2018 16:45 )          CAPILLARY BLOOD GLUCOSE SUBJECTIVE:  Doing well.   No overnight events.   Pain controlled.  Received x2 units RBC overnight for hematocrit 20.    OBJECTIVE:     ** VITAL SIGNS / I&O's **    T(C): 37 (03-15-18 @ 02:00), Max: 37 (03-15-18 @ 02:00)  T(F): 98.6 (03-15-18 @ 02:00), Max: 98.6 (03-15-18 @ 02:00)  HR: 82 (03-15-18 @ 03:00) (82 - 92)  BP: 92/60 (03-15-18 @ 03:00) (86/55 - 141/82)  RR: 20 (03-15-18 @ 03:00) (11 - 20)  SpO2: 100% (03-15-18 @ 03:00) (94% - 100%)      14 Mar 2018 07:01  -  15 Mar 2018 05:45  --------------------------------------------------------  IN:    IV PiggyBack: 50 mL    lactated ringers.: 1625 mL    Packed Red Blood Cells: 600 mL  Total IN: 2275 mL    OUT:    Bulb: 45 mL    Bulb: 53 mL    Bulb: 23 mL    Bulb: 161 mL    Bulb: 32 mL    Bulb: 36 mL    Indwelling Catheter - Urethral: 1000 mL  Total OUT: 1350 mL    Total NET: 925 mL          ** PHYSICAL EXAM **    -- CONSTITUTIONAL: AOx3. NAD.   -- RIGHT BREAST / FLAP: Mastectomy skin flap ecchymosis, stable. No collections. Flap soft and pink with 2-3 second capillary refill. Vioptix: 60s. Drains serosanguinous.  -- LEFT BREAST / FLAP: Mastectomy skin flap ecchymosis, stable. No collections. Flap soft and pink with 2-3 second capillary refill. Vioptix: 70s%. Drains serosanguinous.  -- ABDOMEN: Soft. No collections. Incision intact. (+) polo-incisional ecchymosis. Umbilicus viable. Drains serosanguinous.    ** LABS**                 7.0    10.77  )----------(  182       ( 14 Mar 2018 16:45 )               20.5      143    |  101    |  16     ----------------------------<  178        ( 14 Mar 2018 16:45 )  3.3     |  22     |  0.88     Ca    7.5        ( 14 Mar 2018 16:45 )

## 2018-03-15 NOTE — PROGRESS NOTE ADULT - SUBJECTIVE AND OBJECTIVE BOX
Surgical Oncology    Pain controlled this morning. No nausea or emesis overnight. No current complaints. Patient received 2U PRBC overnight for HCT of 20 with appropriate response.    Vital Signs Last 24 Hrs  T(C): 37.3 (03-15-18 @ 06:00), Max: 37.3 (03-15-18 @ 06:00)  T(F): 99.1 (03-15-18 @ 06:00), Max: 99.1 (03-15-18 @ 06:00)  HR: 94 (03-15-18 @ 07:00) (82 - 94)  BP: 100/59 (03-15-18 @ 07:00) (86/55 - 105/70)  BP(mean): 71 (03-15-18 @ 07:00) (66 - 95)  RR: 20 (03-15-18 @ 07:00) (11 - 33)  SpO2: 100% (03-15-18 @ 07:00) (94% - 100%)  I&O's Detail    14 Mar 2018 07:01  -  15 Mar 2018 07:00  --------------------------------------------------------  IN:    IV PiggyBack: 100 mL    Lactated Ringers IV Bolus: 1000 mL    lactated ringers.: 2000 mL    Packed Red Blood Cells: 600 mL  Total IN: 3700 mL    OUT:    Bulb: 54 mL    Bulb: 42 mL    Bulb: 169 mL    Bulb: 50 mL    Bulb: 37 mL    Bulb: 24 mL    Indwelling Catheter - Urethral: 1130 mL  Total OUT: 1506 mL    Total NET: 2194 mL      15 Mar 2018 07:01  -  15 Mar 2018 09:30  --------------------------------------------------------  IN:    IV PiggyBack: 100 mL    lactated ringers.: 125 mL  Total IN: 225 mL    OUT:    Indwelling Catheter - Urethral: 45 mL  Total OUT: 45 mL    Total NET: 180 mL    PE  Gen: NAD  B/l incisions c/d/i with some residual bruising, JPs serosang x 6.                            9.5    9.49  )-----------( 175      ( 15 Mar 2018 05:21 )             29.0     03-15    142  |  101  |  19  ----------------------------<  125<H>  4.0   |  27  |  1.02    Ca    7.8<L>      15 Mar 2018 05:21        CAPILLARY BLOOD GLUCOSE          MEDICATIONS  (STANDING):  acetaminophen  IVPB. 1000 milliGRAM(s) IV Intermittent once  docusate sodium 100 milliGRAM(s) Oral three times a day  enoxaparin Injectable 40 milliGRAM(s) SubCutaneous every 24 hours  lactated ringers. 1000 milliLiter(s) (75 mL/Hr) IV Continuous <Continuous>    MEDICATIONS  (PRN):  HYDROmorphone  Injectable 0.5 milliGRAM(s) IV Push every 4 hours PRN Severe Pain (7 - 10)  oxyCODONE    IR 5 milliGRAM(s) Oral every 4 hours PRN Moderate Pain (4 - 6)

## 2018-03-15 NOTE — PROGRESS NOTE ADULT - SUBJECTIVE AND OBJECTIVE BOX
ANESTHESIA POSTOP CHECK    57y Female POSTOP DAY 1 S/P     Vital Signs Last 24 Hrs  T(C): 37.3 (15 Mar 2018 06:00), Max: 37.3 (15 Mar 2018 06:00)  T(F): 99.1 (15 Mar 2018 06:00), Max: 99.1 (15 Mar 2018 06:00)  HR: 94 (15 Mar 2018 07:00) (82 - 94)  BP: 100/59 (15 Mar 2018 07:00) (86/55 - 105/70)  BP(mean): 71 (15 Mar 2018 07:00) (66 - 95)  RR: 20 (15 Mar 2018 07:00) (11 - 33)  SpO2: 100% (15 Mar 2018 07:00) (94% - 100%)  I&O's Summary    14 Mar 2018 07:01  -  15 Mar 2018 07:00  --------------------------------------------------------  IN: 3700 mL / OUT: 1506 mL / NET: 2194 mL    15 Mar 2018 07:01  -  15 Mar 2018 09:40  --------------------------------------------------------  IN: 225 mL / OUT: 45 mL / NET: 180 mL        [ x] NO APPARENT ANESTHESIA COMPLICATIONS      Comments:

## 2018-03-16 LAB
HCT VFR BLD CALC: 22.9 % — LOW (ref 34.5–45)
HGB BLD-MCNC: 7.6 G/DL — LOW (ref 11.5–15.5)
MCHC RBC-ENTMCNC: 29.7 PG — SIGNIFICANT CHANGE UP (ref 27–34)
MCHC RBC-ENTMCNC: 33.2 % — SIGNIFICANT CHANGE UP (ref 32–36)
MCV RBC AUTO: 89.5 FL — SIGNIFICANT CHANGE UP (ref 80–100)
NRBC # FLD: 0 — SIGNIFICANT CHANGE UP
PLATELET # BLD AUTO: 145 K/UL — LOW (ref 150–400)
PMV BLD: 9.2 FL — SIGNIFICANT CHANGE UP (ref 7–13)
RBC # BLD: 2.56 M/UL — LOW (ref 3.8–5.2)
RBC # FLD: 13.2 % — SIGNIFICANT CHANGE UP (ref 10.3–14.5)
WBC # BLD: 8.01 K/UL — SIGNIFICANT CHANGE UP (ref 3.8–10.5)
WBC # FLD AUTO: 8.01 K/UL — SIGNIFICANT CHANGE UP (ref 3.8–10.5)

## 2018-03-16 PROCEDURE — 93010 ELECTROCARDIOGRAM REPORT: CPT

## 2018-03-16 RX ORDER — OXYCODONE HYDROCHLORIDE 5 MG/1
1 TABLET ORAL
Qty: 0 | Refills: 0 | COMMUNITY
Start: 2018-03-16

## 2018-03-16 RX ORDER — SODIUM CHLORIDE 9 MG/ML
1000 INJECTION, SOLUTION INTRAVENOUS ONCE
Qty: 0 | Refills: 0 | Status: COMPLETED | OUTPATIENT
Start: 2018-03-16 | End: 2018-03-16

## 2018-03-16 RX ORDER — CEPHALEXIN 500 MG
1 CAPSULE ORAL
Qty: 0 | Refills: 0 | COMMUNITY

## 2018-03-16 RX ADMIN — OXYCODONE HYDROCHLORIDE 5 MILLIGRAM(S): 5 TABLET ORAL at 00:53

## 2018-03-16 RX ADMIN — ENOXAPARIN SODIUM 40 MILLIGRAM(S): 100 INJECTION SUBCUTANEOUS at 10:49

## 2018-03-16 RX ADMIN — Medication 100 MILLIGRAM(S): at 22:16

## 2018-03-16 RX ADMIN — OXYCODONE HYDROCHLORIDE 5 MILLIGRAM(S): 5 TABLET ORAL at 23:00

## 2018-03-16 RX ADMIN — SODIUM CHLORIDE 1000 MILLILITER(S): 9 INJECTION, SOLUTION INTRAVENOUS at 14:36

## 2018-03-16 RX ADMIN — Medication 100 MILLIGRAM(S): at 14:16

## 2018-03-16 RX ADMIN — OXYCODONE HYDROCHLORIDE 5 MILLIGRAM(S): 5 TABLET ORAL at 05:59

## 2018-03-16 RX ADMIN — Medication 100 MILLIGRAM(S): at 05:43

## 2018-03-16 RX ADMIN — OXYCODONE HYDROCHLORIDE 5 MILLIGRAM(S): 5 TABLET ORAL at 06:30

## 2018-03-16 RX ADMIN — OXYCODONE HYDROCHLORIDE 5 MILLIGRAM(S): 5 TABLET ORAL at 01:25

## 2018-03-16 RX ADMIN — SODIUM CHLORIDE 75 MILLILITER(S): 9 INJECTION, SOLUTION INTRAVENOUS at 06:00

## 2018-03-16 RX ADMIN — OXYCODONE HYDROCHLORIDE 5 MILLIGRAM(S): 5 TABLET ORAL at 22:15

## 2018-03-16 NOTE — PROVIDER CONTACT NOTE (OTHER) - ASSESSMENT
Pt is resting comfortably. Pt is adequately managed. Pt denies chest pain, sob, dizziness, headaches and  visual changes. Vitals 88/50 heart rate 98. temp 99. Pt is asymptomatic.

## 2018-03-16 NOTE — PROGRESS NOTE ADULT - SUBJECTIVE AND OBJECTIVE BOX
Surgical Oncology    Pt feeling well. Pain overnight, but improved with medications. No nausea or emesis. Ambulating and having bowel function    Vital Signs Last 24 Hrs  T(C): 37.2 (03-16-18 @ 05:52), Max: 37.3 (03-16-18 @ 02:16)  T(F): 99 (03-16-18 @ 05:52), Max: 99.1 (03-16-18 @ 02:16)  HR: 98 (03-16-18 @ 05:52) (90 - 98)  BP: 88/50 (03-16-18 @ 05:52) (86/47 - 99/55)  BP(mean): --  RR: 16 (03-16-18 @ 05:52) (13 - 18)  SpO2: 100% (03-16-18 @ 05:52) (98% - 100%)  I&O's Detail    15 Mar 2018 07:01  -  16 Mar 2018 07:00  --------------------------------------------------------  IN:    IV PiggyBack: 200 mL    lactated ringers.: 1175 mL    Oral Fluid: 390 mL  Total IN: 1765 mL    OUT:    Bulb: 30 mL    Bulb: 15.5 mL    Bulb: 19 mL    Bulb: 71.5 mL    Bulb: 84 mL    Bulb: 134.5 mL    Indwelling Catheter - Urethral: 520 mL    Voided: 550 mL  Total OUT: 1424.5 mL    Total NET: 340.5 mL    PE  Gen: NAD  Incision c/d/i without surrounding erythema, some ecchymosis                          9.5    9.49  )-----------( 175      ( 15 Mar 2018 05:21 )             29.0     03-15    142  |  101  |  19  ----------------------------<  125<H>  4.0   |  27  |  1.02    Ca    7.8<L>      15 Mar 2018 05:21        CAPILLARY BLOOD GLUCOSE          MEDICATIONS  (STANDING):  ceFAZolin   IVPB 1000 milliGRAM(s) IV Intermittent every 8 hours  docusate sodium 100 milliGRAM(s) Oral three times a day  enoxaparin Injectable 40 milliGRAM(s) SubCutaneous every 24 hours  lactated ringers. 1000 milliLiter(s) (75 mL/Hr) IV Continuous <Continuous>    MEDICATIONS  (PRN):  HYDROmorphone  Injectable 0.5 milliGRAM(s) IV Push every 4 hours PRN Severe Pain (7 - 10)  oxyCODONE    IR 5 milliGRAM(s) Oral every 4 hours PRN Moderate Pain (4 - 6)

## 2018-03-16 NOTE — PROGRESS NOTE ADULT - SUBJECTIVE AND OBJECTIVE BOX
Plastic Surgery Progress Note    S: Patient seen and examined.  doing well.     Vital Signs Last 24 Hrs  T(C): 37.2 (16 Mar 2018 05:52), Max: 37.3 (16 Mar 2018 02:16)  T(F): 99 (16 Mar 2018 05:52), Max: 99.1 (16 Mar 2018 02:16)  HR: 98 (16 Mar 2018 05:52) (90 - 98)  BP: 88/50 (16 Mar 2018 05:52) (86/47 - 99/55)  BP(mean): --  RR: 16 (16 Mar 2018 05:52) (13 - 18)  SpO2: 100% (16 Mar 2018 05:52) (98% - 100%)  I&O's Detail    15 Mar 2018 07:01  -  16 Mar 2018 07:00  --------------------------------------------------------  IN:    IV PiggyBack: 200 mL    lactated ringers.: 1175 mL    Oral Fluid: 390 mL  Total IN: 1765 mL    OUT:    Bulb: 30 mL    Bulb: 15.5 mL    Bulb: 19 mL    Bulb: 71.5 mL    Bulb: 84 mL    Bulb: 134.5 mL    Indwelling Catheter - Urethral: 520 mL    Voided: 550 mL  Total OUT: 1424.5 mL    Total NET: 340.5 mL          Physical Exam:  General: Awake and alert, NAD  Chest: mild mastectomy flap eccymosis, flaps soft, drains SS, no signs fluid collection  Abd: incision CDI, no signs of fluid collection, drains SS

## 2018-03-17 ENCOUNTER — TRANSCRIPTION ENCOUNTER (OUTPATIENT)
Age: 58
End: 2018-03-17

## 2018-03-17 LAB
HCT VFR BLD CALC: 24.4 % — LOW (ref 34.5–45)
HGB BLD-MCNC: 8.2 G/DL — LOW (ref 11.5–15.5)
MCHC RBC-ENTMCNC: 30.6 PG — SIGNIFICANT CHANGE UP (ref 27–34)
MCHC RBC-ENTMCNC: 33.6 % — SIGNIFICANT CHANGE UP (ref 32–36)
MCV RBC AUTO: 91 FL — SIGNIFICANT CHANGE UP (ref 80–100)
NRBC # FLD: 0 — SIGNIFICANT CHANGE UP
PLATELET # BLD AUTO: 148 K/UL — LOW (ref 150–400)
PMV BLD: 9.8 FL — SIGNIFICANT CHANGE UP (ref 7–13)
RBC # BLD: 2.68 M/UL — LOW (ref 3.8–5.2)
RBC # FLD: 13.4 % — SIGNIFICANT CHANGE UP (ref 10.3–14.5)
WBC # BLD: 7.84 K/UL — SIGNIFICANT CHANGE UP (ref 3.8–10.5)
WBC # FLD AUTO: 7.84 K/UL — SIGNIFICANT CHANGE UP (ref 3.8–10.5)

## 2018-03-17 RX ADMIN — Medication 100 MILLIGRAM(S): at 06:15

## 2018-03-17 RX ADMIN — Medication 100 MILLIGRAM(S): at 21:58

## 2018-03-17 RX ADMIN — ENOXAPARIN SODIUM 40 MILLIGRAM(S): 100 INJECTION SUBCUTANEOUS at 12:54

## 2018-03-17 RX ADMIN — Medication 100 MILLIGRAM(S): at 15:03

## 2018-03-17 RX ADMIN — OXYCODONE HYDROCHLORIDE 5 MILLIGRAM(S): 5 TABLET ORAL at 21:58

## 2018-03-17 RX ADMIN — OXYCODONE HYDROCHLORIDE 5 MILLIGRAM(S): 5 TABLET ORAL at 22:45

## 2018-03-17 NOTE — PROGRESS NOTE ADULT - SUBJECTIVE AND OBJECTIVE BOX
Transfused 1U PRBC for post-operative blood loss anemia. H/H response 8.2/24.4 from 7.6/22.9    Vital Signs Last 24 Hrs  T(C): 37.7 (17 Mar 2018 09:54), Max: 37.8 (16 Mar 2018 22:38)  T(F): 99.9 (17 Mar 2018 09:54), Max: 100.1 (16 Mar 2018 22:38)  HR: 113 (17 Mar 2018 09:54) (100 - 113)  BP: 97/61 (17 Mar 2018 09:54) (84/40 - 106/47)  BP(mean): --  RR: 18 (17 Mar 2018 09:54) (16 - 18)  SpO2: 97% (17 Mar 2018 09:54) (95% - 100%)    Bilateral breasts with soft, warm, viable flaps. No collections in breasts  R axilla with mild fullness, non expanding. Pressure dressing applied after exam.   JPs serosang.

## 2018-03-18 VITALS
HEART RATE: 102 BPM | DIASTOLIC BLOOD PRESSURE: 57 MMHG | OXYGEN SATURATION: 97 % | TEMPERATURE: 98 F | RESPIRATION RATE: 17 BRPM | SYSTOLIC BLOOD PRESSURE: 98 MMHG

## 2018-03-18 RX ADMIN — ENOXAPARIN SODIUM 40 MILLIGRAM(S): 100 INJECTION SUBCUTANEOUS at 11:59

## 2018-03-18 RX ADMIN — Medication 100 MILLIGRAM(S): at 06:14

## 2018-03-18 NOTE — DISCHARGE NOTE ADULT - CARE PROVIDER_API CALL
Neymar Burleson), Plastic Surgery  [address]  [address]  [place] [address]  Phone: [phone]  Fax: [phone]    Maria De Jesus Gutierrez), Surgery  [address] [place] [address]  Phone: [phone]  Fax: [phone]

## 2018-03-18 NOTE — PROGRESS NOTE ADULT - SUBJECTIVE AND OBJECTIVE BOX
Surgical Oncology    Feeling well. Pain controlled. Tolerating diet. Febrile overnight    Vital Signs Last 24 Hrs  T(C): 37.2 (03-18-18 @ 06:13), Max: 38.4 (03-17-18 @ 17:55)  T(F): 98.9 (03-18-18 @ 06:13), Max: 101.1 (03-17-18 @ 17:55)  HR: 102 (03-18-18 @ 06:13) (102 - 116)  BP: 112/61 (03-18-18 @ 06:13) (97/61 - 117/68)  BP(mean): --  RR: 16 (03-18-18 @ 06:13) (16 - 18)  SpO2: 94% (03-18-18 @ 06:13) (94% - 97%)  I&O's Detail    17 Mar 2018 07:01  -  18 Mar 2018 07:00  --------------------------------------------------------  IN:    IV PiggyBack: 100 mL    Oral Fluid: 480 mL  Total IN: 580 mL    OUT:    Bulb: 68.5 mL    Bulb: 2.5 mL    Bulb: 16 mL    Bulb: 1.7 mL    Bulb: 86 mL    Bulb: 64.5 mL    Voided: 400 mL  Total OUT: 639.2 mL    Total NET: -59.2 mL    PE  Gen: NAD  B/L breasts: incisions c/d/i, DAVION serosang x 6                          8.2    7.84  )-----------( 148      ( 17 Mar 2018 05:30 )             24.4             CAPILLARY BLOOD GLUCOSE          MEDICATIONS  (STANDING):  docusate sodium 100 milliGRAM(s) Oral three times a day  enoxaparin Injectable 40 milliGRAM(s) SubCutaneous every 24 hours    MEDICATIONS  (PRN):  HYDROmorphone  Injectable 0.5 milliGRAM(s) IV Push every 4 hours PRN Severe Pain (7 - 10)  oxyCODONE    IR 5 milliGRAM(s) Oral every 4 hours PRN Moderate Pain (4 - 6)

## 2018-03-18 NOTE — DISCHARGE NOTE ADULT - MEDICATION SUMMARY - MEDICATIONS TO TAKE
I will START or STAY ON the medications listed below when I get home from the hospital:    multivitamin, woman's 50+  -- 1 tab(s) by mouth once a day  last dose on 3/6/18  -- Indication: For home med    oxyCODONE 5 mg oral tablet  -- 1 tab(s) by mouth every 4 hours, As needed, Moderate Pain (4 - 6)  -- Indication: For surgery    naproxen 250 mg oral tablet  -- 1 tab(s) by mouth 2 times a day  -- Indication: For surgery    olmesartan-hydrochlorothiazide 40 mg-25 mg oral tablet  -- 1 tab(s) by mouth once a day  -- Indication: For home med    cefadroxil 500 mg oral capsule  -- 1 cap(s) by mouth every 12 hours  -- Indication: For surgery    Vitamin D3 1000 intl units oral capsule  -- 1 cap(s) by mouth 2 times a day  -- Indication: For home med    biotin  -- 1250 microgram(s) by mouth 3 times a day  last dose on 3/6/18  -- Indication: For home med

## 2018-03-18 NOTE — PROGRESS NOTE ADULT - PROVIDER SPECIALTY LIST ADULT
Anesthesia
Plastic Surgery
Surgery

## 2018-03-18 NOTE — DISCHARGE NOTE ADULT - NS AS ACTIVITY OBS
Showering allowed/Do not drive or operate machinery/Do not make important decisions/No Heavy lifting/straining/Stairs allowed

## 2018-03-18 NOTE — DISCHARGE NOTE ADULT - HOME CARE AGENCY
[facility] for RN visit 3/18. [facility] [phone] for RN visit 3/18.

## 2018-03-18 NOTE — PROGRESS NOTE ADULT - ASSESSMENT
57y Female s/p b/l mastectomy with BUNNY recon.     - Pain control  - Strict I/O's  - monitor flap saturations  - OOB/DVT ppx    FROYLAN Fairchild MD PGY 2   [id number]
57F s/p bilateral mastectomies and bilateral breast reconstruction with abdominal free flaps.    -q4 flap checks.  -Continue antibiotics.  -Continue DVT prophylaxis (SQH and SCDs).  -PO ibuprofen and PO Tylenol when IV doses complete.  -Incentive spirometry.  -HOB at 30 degrees; Bed flexed at hips.  -ambulate  -possible dc today
57F s/p bilateral mastectomies and bilateral breast reconstruction with abdominal free flaps.  >> Transfer to surgical floor.  >> Upon transfer to surgical floor, de-escalate flap checks to q2H.  >> Continue Vioptix.  >> Continue antibiotics.  >> Continue DVT prophylaxis (SQH and SCDs).  >> Continue standing remaining doses of IV Toradol and IV Tylenol. Transition to standing PO ibuprofen and PO Tylenol when IV doses complete.  >> Continue LR @ 75 mL/hr.   >> Regular diet.  >> Reduce O2 by nasal cannula to 2 LPM.  >> Incentive spirometry.  >> HOB at 30 degrees; Bed flexed at hips.  >> Out of bed to chair with assistance.  >> Plan to remove Peña catheter once out of bed.
57y Female s/p b/l mastectomy with BUNNY recon.     - Pain control  - OOB/DVT ppx  - Care per plastics    FROYLAN Fairchild MD PGY 2   [id number]
57y Female s/p b/l mastectomy with BUNNY recon.     - Pain control  - Strict I/O's  - OOB/DVT ppx  - Care per plastics    FROYLAN Fairchild MD PGY 2   [id number]
POD 3 s/p b/l mastectomy and bilateral BUNNY flap reconstruction    - continue flap checks q4 hours.   - Continue to encourage ambulation.  - OOB as tolerated.  - D/c planning tomorrow.

## 2018-03-18 NOTE — PROGRESS NOTE ADULT - SUBJECTIVE AND OBJECTIVE BOX
afeb today after temp yesterday  walking and comfortable, would like to go home  flaps pink and viable  no collections  Brian ramos  PA home today with home care  f/u Dr. Burleson this week

## 2018-03-18 NOTE — DISCHARGE NOTE ADULT - INSTRUCTIONS
no change call md office if having temperature above 101,if incison site looks red ,swollen or discharge noted ,if flap color changes ,if having excruciating incisional  pain not relieved by medication ,

## 2018-03-18 NOTE — PROGRESS NOTE ADULT - SUBJECTIVE AND OBJECTIVE BOX
SUBJECTIVE:  Feels better.  No dizziness with ambulation.  No overnight events.     OBJECTIVE:     ** VITAL SIGNS / I&O's **    Vital Signs Last 24 Hrs  T(C): 37.2 (18 Mar 2018 06:13), Max: 38.4 (17 Mar 2018 17:55)  T(F): 98.9 (18 Mar 2018 06:13), Max: 101.1 (17 Mar 2018 17:55)  HR: 102 (18 Mar 2018 06:13) (102 - 116)  BP: 112/61 (18 Mar 2018 06:13) (97/61 - 117/68)  BP(mean): --  RR: 16 (18 Mar 2018 06:13) (16 - 18)  SpO2: 94% (18 Mar 2018 06:13) (94% - 97%)      17 Mar 2018 07:01  -  18 Mar 2018 07:00  --------------------------------------------------------  IN:    IV PiggyBack: 100 mL    Oral Fluid: 480 mL  Total IN: 580 mL    OUT:    Bulb: 68.5 mL    Bulb: 2.5 mL    Bulb: 16 mL    Bulb: 1.7 mL    Bulb: 86 mL    Bulb: 64.5 mL    Voided: 400 mL  Total OUT: 639.2 mL    Total NET: -59.2 mL          ** PHYSICAL EXAM **    -- CONSTITUTIONAL: AOx3. NAD.   -- RESPIRATORY: no increased work of breathing  -- CHEST: b/l flaps soft/warm/viable, mastectomy ecchymosis stable, R axillary ecchymosis stable/soft  -- ABDOMEN: closure intact, no collection, umbo viable  -- JPs serosang      ** LABS **                          8.2    7.84  )-----------( 148      ( 17 Mar 2018 05:30 )             24.4         A/P: POD#4   s/p   b/l mastectomies, b/l BUNNY flaps     - cont flap checks q4  - cont jps  - reg diet  - Pain control  - IS  - Ambulate  - DVT prophylaxis   - d/c home

## 2018-03-18 NOTE — DISCHARGE NOTE ADULT - HOSPITAL COURSE
58 yo F s/p b/l mastectomies and BUNNY flaps on 3/14/18. Pt tolerated procedure well.  Post-op, pt was anemic with H/H 7/20.5 and received 2u pRBC. She was then transferred to regular floor. Flaps remained healthy/viable, vioptix was d/c'd on POD#2. Also, pt was noted to have R axillary ecchymosis/fullness on POD#3 likely from residual fluid collection, received additional 1u pRBC.  Pt is stable for d/c. Ambulating, voiding, pain is well-controlled. Instructed to f/u with Dr. Burleson and Dr. Gutierrez as outpatient.

## 2018-03-18 NOTE — DISCHARGE NOTE ADULT - CONDITIONS AT DISCHARGE
vitals stable ,pt denies any distress, tolerated diet ,ambulating as tolerated ,incision site clean dry and intact,+ve flap cap refill,iv discontinued ,discharge instructions provided pt verbalize understanding it ,pt  able to empty and recap DAVION

## 2018-03-18 NOTE — DISCHARGE NOTE ADULT - PATIENT PORTAL LINK FT
You can access the [facility] Portal, offered by [facility], by registering with the following website: [email or web address]

## 2018-04-03 ENCOUNTER — APPOINTMENT (OUTPATIENT)
Dept: SURGERY | Facility: CLINIC | Age: 58
End: 2018-04-03
Payer: COMMERCIAL

## 2018-04-03 VITALS
TEMPERATURE: 98 F | HEIGHT: 63 IN | BODY MASS INDEX: 33.31 KG/M2 | OXYGEN SATURATION: 100 % | SYSTOLIC BLOOD PRESSURE: 105 MMHG | WEIGHT: 188 LBS | DIASTOLIC BLOOD PRESSURE: 70 MMHG | HEART RATE: 106 BPM

## 2018-04-03 PROCEDURE — 99024 POSTOP FOLLOW-UP VISIT: CPT

## 2018-04-27 ENCOUNTER — OUTPATIENT (OUTPATIENT)
Dept: OUTPATIENT SERVICES | Facility: HOSPITAL | Age: 58
LOS: 1 days | Discharge: HOME | End: 2018-04-27

## 2018-04-27 DIAGNOSIS — Z98.890 OTHER SPECIFIED POSTPROCEDURAL STATES: Chronic | ICD-10-CM

## 2018-04-27 DIAGNOSIS — Z98.891 HISTORY OF UTERINE SCAR FROM PREVIOUS SURGERY: Chronic | ICD-10-CM

## 2018-04-27 DIAGNOSIS — Z95.828 PRESENCE OF OTHER VASCULAR IMPLANTS AND GRAFTS: Chronic | ICD-10-CM

## 2018-04-27 DIAGNOSIS — Z90.710 ACQUIRED ABSENCE OF BOTH CERVIX AND UTERUS: Chronic | ICD-10-CM

## 2018-04-27 DIAGNOSIS — C50.919 MALIGNANT NEOPLASM OF UNSPECIFIED SITE OF UNSPECIFIED FEMALE BREAST: ICD-10-CM

## 2018-05-01 ENCOUNTER — RESULT REVIEW (OUTPATIENT)
Age: 58
End: 2018-05-01

## 2018-05-01 ENCOUNTER — LABORATORY RESULT (OUTPATIENT)
Age: 58
End: 2018-05-01

## 2018-05-01 ENCOUNTER — APPOINTMENT (OUTPATIENT)
Dept: HEMATOLOGY ONCOLOGY | Facility: CLINIC | Age: 58
End: 2018-05-01
Payer: COMMERCIAL

## 2018-05-01 ENCOUNTER — APPOINTMENT (OUTPATIENT)
Dept: INFUSION THERAPY | Facility: CLINIC | Age: 58
End: 2018-05-01

## 2018-05-01 ENCOUNTER — APPOINTMENT (OUTPATIENT)
Dept: SURGERY | Facility: CLINIC | Age: 58
End: 2018-05-01
Payer: COMMERCIAL

## 2018-05-01 VITALS
SYSTOLIC BLOOD PRESSURE: 115 MMHG | HEIGHT: 62 IN | TEMPERATURE: 98.3 F | WEIGHT: 190 LBS | OXYGEN SATURATION: 100 % | HEART RATE: 91 BPM | DIASTOLIC BLOOD PRESSURE: 79 MMHG | BODY MASS INDEX: 34.96 KG/M2

## 2018-05-01 VITALS
TEMPERATURE: 98.2 F | SYSTOLIC BLOOD PRESSURE: 147 MMHG | OXYGEN SATURATION: 98 % | WEIGHT: 194.43 LBS | DIASTOLIC BLOOD PRESSURE: 97 MMHG | HEART RATE: 87 BPM | BODY MASS INDEX: 35.56 KG/M2

## 2018-05-01 DIAGNOSIS — Z00.00 ENCOUNTER FOR GENERAL ADULT MEDICAL EXAMINATION W/OUT ABNORMAL FINDINGS: ICD-10-CM

## 2018-05-01 LAB
BASOPHILS # BLD AUTO: 0 K/UL — SIGNIFICANT CHANGE UP (ref 0–0.2)
BASOPHILS NFR BLD AUTO: 0.8 % — SIGNIFICANT CHANGE UP (ref 0–2)
EOSINOPHIL # BLD AUTO: 0.1 K/UL — SIGNIFICANT CHANGE UP (ref 0–0.5)
EOSINOPHIL NFR BLD AUTO: 2.9 % — SIGNIFICANT CHANGE UP (ref 0–6)
HCT VFR BLD CALC: 36.4 % — SIGNIFICANT CHANGE UP (ref 34.5–45)
HGB BLD-MCNC: 12.3 G/DL — SIGNIFICANT CHANGE UP (ref 11.5–15.5)
LYMPHOCYTES # BLD AUTO: 1.3 K/UL — SIGNIFICANT CHANGE UP (ref 1–3.3)
LYMPHOCYTES # BLD AUTO: 25.4 % — SIGNIFICANT CHANGE UP (ref 13–44)
MCHC RBC-ENTMCNC: 28.6 PG — SIGNIFICANT CHANGE UP (ref 27–34)
MCHC RBC-ENTMCNC: 33.7 GM/DL — SIGNIFICANT CHANGE UP (ref 32–36)
MCV RBC AUTO: 84.8 FL — SIGNIFICANT CHANGE UP (ref 80–100)
MONOCYTES # BLD AUTO: 0.5 K/UL — SIGNIFICANT CHANGE UP (ref 0–0.9)
MONOCYTES NFR BLD AUTO: 9.8 % — SIGNIFICANT CHANGE UP (ref 2–14)
NEUTROPHILS # BLD AUTO: 3.1 K/UL — SIGNIFICANT CHANGE UP (ref 1.8–7.4)
NEUTROPHILS NFR BLD AUTO: 61.1 % — SIGNIFICANT CHANGE UP (ref 43–77)
PLATELET # BLD AUTO: 261 K/UL — SIGNIFICANT CHANGE UP (ref 150–400)
RBC # BLD: 4.29 M/UL — SIGNIFICANT CHANGE UP (ref 3.8–5.2)
RBC # FLD: 12 % — SIGNIFICANT CHANGE UP (ref 10.3–14.5)
WBC # BLD: 5 K/UL — SIGNIFICANT CHANGE UP (ref 3.8–10.5)
WBC # FLD AUTO: 5 K/UL — SIGNIFICANT CHANGE UP (ref 3.8–10.5)

## 2018-05-01 PROCEDURE — 99214 OFFICE O/P EST MOD 30 MIN: CPT

## 2018-05-01 PROCEDURE — 99024 POSTOP FOLLOW-UP VISIT: CPT

## 2018-05-01 RX ORDER — OXYCODONE AND ACETAMINOPHEN 5; 325 MG/1; MG/1
5-325 TABLET ORAL
Qty: 30 | Refills: 0 | Status: DISCONTINUED | COMMUNITY
Start: 2018-03-07 | End: 2018-05-01

## 2018-05-01 RX ORDER — OLMESARTAN MEDOXOMIL AND HYDROCHLOROTHIAZIDE 40; 25 MG/1; MG/1
40-25 TABLET ORAL
Qty: 14 | Refills: 2 | Status: DISCONTINUED | COMMUNITY
Start: 2017-07-26 | End: 2018-05-01

## 2018-05-01 RX ORDER — MAGNESIUM OXIDE 241.3 MG/1000MG
400 TABLET ORAL TWICE DAILY
Qty: 60 | Refills: 0 | Status: DISCONTINUED | COMMUNITY
Start: 2017-11-09 | End: 2018-05-01

## 2018-05-01 RX ORDER — CEFADROXIL 500 MG/1
500 CAPSULE ORAL
Qty: 10 | Refills: 0 | Status: DISCONTINUED | COMMUNITY
Start: 2018-03-07 | End: 2018-05-01

## 2018-05-01 RX ORDER — ONDANSETRON 8 MG/1
8 TABLET ORAL
Qty: 90 | Refills: 1 | Status: DISCONTINUED | COMMUNITY
Start: 2017-09-11 | End: 2018-05-01

## 2018-05-01 RX ORDER — DEXAMETHASONE 4 MG/1
4 TABLET ORAL
Qty: 60 | Refills: 0 | Status: DISCONTINUED | COMMUNITY
Start: 2017-09-11 | End: 2018-05-01

## 2018-05-01 RX ORDER — LIDOCAINE AND PRILOCAINE 25; 25 MG/G; MG/G
2.5-2.5 CREAM TOPICAL
Qty: 1 | Refills: 2 | Status: DISCONTINUED | COMMUNITY
Start: 2017-12-12 | End: 2018-05-01

## 2018-05-01 RX ORDER — MAGNESIUM OXIDE 500 MG
500 TABLET ORAL TWICE DAILY
Qty: 120 | Refills: 1 | Status: DISCONTINUED | COMMUNITY
Start: 2018-03-01 | End: 2018-05-01

## 2018-05-01 RX ORDER — UBIDECARENONE/VIT E ACET 100MG-5
50 MCG CAPSULE ORAL
Refills: 0 | Status: DISCONTINUED | COMMUNITY
End: 2018-05-01

## 2018-05-01 RX ORDER — NAPROXEN 500 MG/1
500 TABLET ORAL
Qty: 30 | Refills: 0 | Status: DISCONTINUED | COMMUNITY
Start: 2018-03-07 | End: 2018-05-01

## 2018-05-01 RX ORDER — PROCHLORPERAZINE MALEATE 10 MG/1
10 TABLET ORAL EVERY 6 HOURS
Qty: 120 | Refills: 1 | Status: DISCONTINUED | COMMUNITY
Start: 2017-09-11 | End: 2018-05-01

## 2018-05-03 ENCOUNTER — APPOINTMENT (OUTPATIENT)
Dept: INFUSION THERAPY | Facility: CLINIC | Age: 58
End: 2018-05-03

## 2018-05-04 DIAGNOSIS — Z51.11 ENCOUNTER FOR ANTINEOPLASTIC CHEMOTHERAPY: ICD-10-CM

## 2018-05-07 ENCOUNTER — FORM ENCOUNTER (OUTPATIENT)
Age: 58
End: 2018-05-07

## 2018-05-08 ENCOUNTER — OUTPATIENT (OUTPATIENT)
Dept: OUTPATIENT SERVICES | Facility: HOSPITAL | Age: 58
LOS: 1 days | End: 2018-05-08
Payer: COMMERCIAL

## 2018-05-08 DIAGNOSIS — Z98.890 OTHER SPECIFIED POSTPROCEDURAL STATES: Chronic | ICD-10-CM

## 2018-05-08 DIAGNOSIS — Z90.710 ACQUIRED ABSENCE OF BOTH CERVIX AND UTERUS: Chronic | ICD-10-CM

## 2018-05-08 DIAGNOSIS — Z95.828 PRESENCE OF OTHER VASCULAR IMPLANTS AND GRAFTS: Chronic | ICD-10-CM

## 2018-05-08 DIAGNOSIS — C50.911 MALIGNANT NEOPLASM OF UNSPECIFIED SITE OF RIGHT FEMALE BREAST: ICD-10-CM

## 2018-05-08 DIAGNOSIS — Z98.891 HISTORY OF UTERINE SCAR FROM PREVIOUS SURGERY: Chronic | ICD-10-CM

## 2018-05-08 PROCEDURE — 93306 TTE W/DOPPLER COMPLETE: CPT | Mod: 26

## 2018-05-08 PROCEDURE — 93306 TTE W/DOPPLER COMPLETE: CPT

## 2018-05-24 ENCOUNTER — LABORATORY RESULT (OUTPATIENT)
Age: 58
End: 2018-05-24

## 2018-05-24 ENCOUNTER — APPOINTMENT (OUTPATIENT)
Dept: INFUSION THERAPY | Facility: CLINIC | Age: 58
End: 2018-05-24

## 2018-05-24 ENCOUNTER — APPOINTMENT (OUTPATIENT)
Dept: HEMATOLOGY ONCOLOGY | Facility: CLINIC | Age: 58
End: 2018-05-24
Payer: COMMERCIAL

## 2018-05-24 ENCOUNTER — RESULT REVIEW (OUTPATIENT)
Age: 58
End: 2018-05-24

## 2018-05-24 VITALS
WEIGHT: 195.66 LBS | HEART RATE: 81 BPM | HEIGHT: 62 IN | SYSTOLIC BLOOD PRESSURE: 138 MMHG | BODY MASS INDEX: 36.01 KG/M2 | TEMPERATURE: 98.4 F | DIASTOLIC BLOOD PRESSURE: 83 MMHG | OXYGEN SATURATION: 95 %

## 2018-05-24 LAB
BASOPHILS # BLD AUTO: 0.1 K/UL — SIGNIFICANT CHANGE UP (ref 0–0.2)
BASOPHILS NFR BLD AUTO: 1.2 % — SIGNIFICANT CHANGE UP (ref 0–2)
EOSINOPHIL # BLD AUTO: 0.1 K/UL — SIGNIFICANT CHANGE UP (ref 0–0.5)
EOSINOPHIL NFR BLD AUTO: 2.6 % — SIGNIFICANT CHANGE UP (ref 0–6)
HCT VFR BLD CALC: 36.2 % — SIGNIFICANT CHANGE UP (ref 34.5–45)
HGB BLD-MCNC: 12.2 G/DL — SIGNIFICANT CHANGE UP (ref 11.5–15.5)
LYMPHOCYTES # BLD AUTO: 1.6 K/UL — SIGNIFICANT CHANGE UP (ref 1–3.3)
LYMPHOCYTES # BLD AUTO: 28.7 % — SIGNIFICANT CHANGE UP (ref 13–44)
MCHC RBC-ENTMCNC: 28 PG — SIGNIFICANT CHANGE UP (ref 27–34)
MCHC RBC-ENTMCNC: 33.8 GM/DL — SIGNIFICANT CHANGE UP (ref 32–36)
MCV RBC AUTO: 82.8 FL — SIGNIFICANT CHANGE UP (ref 80–100)
MONOCYTES # BLD AUTO: 0.4 K/UL — SIGNIFICANT CHANGE UP (ref 0–0.9)
MONOCYTES NFR BLD AUTO: 7.6 % — SIGNIFICANT CHANGE UP (ref 2–14)
NEUTROPHILS # BLD AUTO: 3.4 K/UL — SIGNIFICANT CHANGE UP (ref 1.8–7.4)
NEUTROPHILS NFR BLD AUTO: 59.9 % — SIGNIFICANT CHANGE UP (ref 43–77)
PLATELET # BLD AUTO: 218 K/UL — SIGNIFICANT CHANGE UP (ref 150–400)
RBC # BLD: 4.37 M/UL — SIGNIFICANT CHANGE UP (ref 3.8–5.2)
RBC # FLD: 12 % — SIGNIFICANT CHANGE UP (ref 10.3–14.5)
WBC # BLD: 5.7 K/UL — SIGNIFICANT CHANGE UP (ref 3.8–10.5)
WBC # FLD AUTO: 5.7 K/UL — SIGNIFICANT CHANGE UP (ref 3.8–10.5)

## 2018-05-24 PROCEDURE — 99214 OFFICE O/P EST MOD 30 MIN: CPT

## 2018-05-26 ENCOUNTER — MOBILE ON CALL (OUTPATIENT)
Age: 58
End: 2018-05-26

## 2018-06-12 ENCOUNTER — OUTPATIENT (OUTPATIENT)
Dept: OUTPATIENT SERVICES | Facility: HOSPITAL | Age: 58
LOS: 1 days | Discharge: HOME | End: 2018-06-12

## 2018-06-12 DIAGNOSIS — Z90.710 ACQUIRED ABSENCE OF BOTH CERVIX AND UTERUS: Chronic | ICD-10-CM

## 2018-06-12 DIAGNOSIS — C50.919 MALIGNANT NEOPLASM OF UNSPECIFIED SITE OF UNSPECIFIED FEMALE BREAST: ICD-10-CM

## 2018-06-12 DIAGNOSIS — Z98.891 HISTORY OF UTERINE SCAR FROM PREVIOUS SURGERY: Chronic | ICD-10-CM

## 2018-06-12 DIAGNOSIS — Z95.828 PRESENCE OF OTHER VASCULAR IMPLANTS AND GRAFTS: Chronic | ICD-10-CM

## 2018-06-12 DIAGNOSIS — Z98.890 OTHER SPECIFIED POSTPROCEDURAL STATES: Chronic | ICD-10-CM

## 2018-06-14 ENCOUNTER — LABORATORY RESULT (OUTPATIENT)
Age: 58
End: 2018-06-14

## 2018-06-14 ENCOUNTER — APPOINTMENT (OUTPATIENT)
Dept: INFUSION THERAPY | Facility: CLINIC | Age: 58
End: 2018-06-14

## 2018-06-14 ENCOUNTER — APPOINTMENT (OUTPATIENT)
Dept: HEMATOLOGY ONCOLOGY | Facility: CLINIC | Age: 58
End: 2018-06-14
Payer: COMMERCIAL

## 2018-06-14 VITALS
DIASTOLIC BLOOD PRESSURE: 82 MMHG | BODY MASS INDEX: 36.19 KG/M2 | HEART RATE: 82 BPM | TEMPERATURE: 98.5 F | WEIGHT: 196.65 LBS | SYSTOLIC BLOOD PRESSURE: 121 MMHG | HEIGHT: 62 IN | OXYGEN SATURATION: 93 %

## 2018-06-14 PROCEDURE — 99214 OFFICE O/P EST MOD 30 MIN: CPT

## 2018-06-14 RX ORDER — ACETAMINOPHEN 325 MG
TABLET ORAL
Refills: 0 | Status: DISCONTINUED | COMMUNITY
End: 2018-06-14

## 2018-06-14 RX ORDER — CIPROFLOXACIN HYDROCHLORIDE 250 MG/1
250 TABLET, FILM COATED ORAL
Qty: 14 | Refills: 0 | Status: DISCONTINUED | COMMUNITY
Start: 2018-05-26 | End: 2018-06-14

## 2018-06-14 RX ORDER — DOCUSATE SODIUM 100 MG/1
CAPSULE ORAL
Refills: 0 | Status: DISCONTINUED | COMMUNITY
End: 2018-06-14

## 2018-06-15 DIAGNOSIS — Z51.11 ENCOUNTER FOR ANTINEOPLASTIC CHEMOTHERAPY: ICD-10-CM

## 2018-07-05 ENCOUNTER — LABORATORY RESULT (OUTPATIENT)
Age: 58
End: 2018-07-05

## 2018-07-05 ENCOUNTER — RESULT REVIEW (OUTPATIENT)
Age: 58
End: 2018-07-05

## 2018-07-05 ENCOUNTER — APPOINTMENT (OUTPATIENT)
Dept: HEMATOLOGY ONCOLOGY | Facility: CLINIC | Age: 58
End: 2018-07-05
Payer: COMMERCIAL

## 2018-07-05 ENCOUNTER — APPOINTMENT (OUTPATIENT)
Dept: INFUSION THERAPY | Facility: CLINIC | Age: 58
End: 2018-07-05

## 2018-07-05 VITALS
OXYGEN SATURATION: 100 % | WEIGHT: 195 LBS | DIASTOLIC BLOOD PRESSURE: 90 MMHG | SYSTOLIC BLOOD PRESSURE: 147 MMHG | HEART RATE: 73 BPM | BODY MASS INDEX: 35.88 KG/M2 | HEIGHT: 62 IN

## 2018-07-05 LAB
BASOPHILS # BLD AUTO: 0.1 K/UL — SIGNIFICANT CHANGE UP (ref 0–0.2)
BASOPHILS NFR BLD AUTO: 1.2 % — SIGNIFICANT CHANGE UP (ref 0–2)
BUN SERPL-MCNC: 22 MG/DL — SIGNIFICANT CHANGE UP (ref 7–23)
CA-I BLDA-SCNC: 1.18 MMOL/L — SIGNIFICANT CHANGE UP (ref 1.12–1.3)
CHLORIDE SERPL-SCNC: 98 MMOL/L — SIGNIFICANT CHANGE UP (ref 96–108)
CO2 SERPL-SCNC: 29 MMOL/L — SIGNIFICANT CHANGE UP (ref 22–31)
CREAT SERPL-MCNC: 0.7 MG/DL — SIGNIFICANT CHANGE UP (ref 0.5–1.3)
EOSINOPHIL # BLD AUTO: 0.2 K/UL — SIGNIFICANT CHANGE UP (ref 0–0.5)
EOSINOPHIL NFR BLD AUTO: 3.4 % — SIGNIFICANT CHANGE UP (ref 0–6)
GLUCOSE SERPL-MCNC: 84 MG/DL — SIGNIFICANT CHANGE UP (ref 70–99)
HCT VFR BLD CALC: 35.7 % — SIGNIFICANT CHANGE UP (ref 34.5–45)
HGB BLD-MCNC: 12.4 G/DL — SIGNIFICANT CHANGE UP (ref 11.5–15.5)
LYMPHOCYTES # BLD AUTO: 1.4 K/UL — SIGNIFICANT CHANGE UP (ref 1–3.3)
LYMPHOCYTES # BLD AUTO: 26.8 % — SIGNIFICANT CHANGE UP (ref 13–44)
MCHC RBC-ENTMCNC: 28.1 PG — SIGNIFICANT CHANGE UP (ref 27–34)
MCHC RBC-ENTMCNC: 34.6 GM/DL — SIGNIFICANT CHANGE UP (ref 32–36)
MCV RBC AUTO: 81.2 FL — SIGNIFICANT CHANGE UP (ref 80–100)
MONOCYTES # BLD AUTO: 0.4 K/UL — SIGNIFICANT CHANGE UP (ref 0–0.9)
MONOCYTES NFR BLD AUTO: 8.1 % — SIGNIFICANT CHANGE UP (ref 2–14)
NEUTROPHILS # BLD AUTO: 3.2 K/UL — SIGNIFICANT CHANGE UP (ref 1.8–7.4)
NEUTROPHILS NFR BLD AUTO: 60.5 % — SIGNIFICANT CHANGE UP (ref 43–77)
PLATELET # BLD AUTO: 211 K/UL — SIGNIFICANT CHANGE UP (ref 150–400)
POTASSIUM SERPL-MCNC: 3.1 MMOL/L — LOW (ref 3.5–5.3)
POTASSIUM SERPL-SCNC: 3.1 MMOL/L — LOW (ref 3.5–5.3)
RBC # BLD: 4.4 M/UL — SIGNIFICANT CHANGE UP (ref 3.8–5.2)
RBC # FLD: 12.6 % — SIGNIFICANT CHANGE UP (ref 10.3–14.5)
SODIUM SERPL-SCNC: 140 MMOL/L — SIGNIFICANT CHANGE UP (ref 135–145)
WBC # BLD: 5.2 K/UL — SIGNIFICANT CHANGE UP (ref 3.8–10.5)
WBC # FLD AUTO: 5.2 K/UL — SIGNIFICANT CHANGE UP (ref 3.8–10.5)

## 2018-07-05 PROCEDURE — 99214 OFFICE O/P EST MOD 30 MIN: CPT

## 2018-07-11 ENCOUNTER — RESULT REVIEW (OUTPATIENT)
Age: 58
End: 2018-07-11

## 2018-07-11 ENCOUNTER — APPOINTMENT (OUTPATIENT)
Dept: HEMATOLOGY ONCOLOGY | Facility: CLINIC | Age: 58
End: 2018-07-11

## 2018-07-11 LAB
BASOPHILS # BLD AUTO: 0.1 K/UL — SIGNIFICANT CHANGE UP (ref 0–0.2)
BASOPHILS NFR BLD AUTO: 1.5 % — SIGNIFICANT CHANGE UP (ref 0–2)
EOSINOPHIL # BLD AUTO: 0.1 K/UL — SIGNIFICANT CHANGE UP (ref 0–0.5)
EOSINOPHIL NFR BLD AUTO: 2.6 % — SIGNIFICANT CHANGE UP (ref 0–6)
HCT VFR BLD CALC: 37.8 % — SIGNIFICANT CHANGE UP (ref 34.5–45)
HGB BLD-MCNC: 13.1 G/DL — SIGNIFICANT CHANGE UP (ref 11.5–15.5)
LYMPHOCYTES # BLD AUTO: 1 K/UL — SIGNIFICANT CHANGE UP (ref 1–3.3)
LYMPHOCYTES # BLD AUTO: 19.9 % — SIGNIFICANT CHANGE UP (ref 13–44)
MCHC RBC-ENTMCNC: 28.4 PG — SIGNIFICANT CHANGE UP (ref 27–34)
MCHC RBC-ENTMCNC: 34.6 GM/DL — SIGNIFICANT CHANGE UP (ref 32–36)
MCV RBC AUTO: 82.2 FL — SIGNIFICANT CHANGE UP (ref 80–100)
MONOCYTES # BLD AUTO: 0.3 K/UL — SIGNIFICANT CHANGE UP (ref 0–0.9)
MONOCYTES NFR BLD AUTO: 5.9 % — SIGNIFICANT CHANGE UP (ref 2–14)
NEUTROPHILS # BLD AUTO: 3.6 K/UL — SIGNIFICANT CHANGE UP (ref 1.8–7.4)
NEUTROPHILS NFR BLD AUTO: 70 % — SIGNIFICANT CHANGE UP (ref 43–77)
PLATELET # BLD AUTO: 215 K/UL — SIGNIFICANT CHANGE UP (ref 150–400)
RBC # BLD: 4.6 M/UL — SIGNIFICANT CHANGE UP (ref 3.8–5.2)
RBC # FLD: 12.3 % — SIGNIFICANT CHANGE UP (ref 10.3–14.5)
WBC # BLD: 5.2 K/UL — SIGNIFICANT CHANGE UP (ref 3.8–10.5)
WBC # FLD AUTO: 5.2 K/UL — SIGNIFICANT CHANGE UP (ref 3.8–10.5)

## 2018-07-16 PROBLEM — C50.919 MALIGNANT NEOPLASM OF UNSPECIFIED SITE OF UNSPECIFIED FEMALE BREAST: Chronic | Status: ACTIVE | Noted: 2017-09-15

## 2018-07-17 ENCOUNTER — OUTPATIENT (OUTPATIENT)
Dept: OUTPATIENT SERVICES | Facility: HOSPITAL | Age: 58
LOS: 1 days | Discharge: HOME | End: 2018-07-17

## 2018-07-17 DIAGNOSIS — C50.919 MALIGNANT NEOPLASM OF UNSPECIFIED SITE OF UNSPECIFIED FEMALE BREAST: ICD-10-CM

## 2018-07-17 DIAGNOSIS — Z90.710 ACQUIRED ABSENCE OF BOTH CERVIX AND UTERUS: Chronic | ICD-10-CM

## 2018-07-17 DIAGNOSIS — Z98.890 OTHER SPECIFIED POSTPROCEDURAL STATES: Chronic | ICD-10-CM

## 2018-07-17 DIAGNOSIS — Z95.828 PRESENCE OF OTHER VASCULAR IMPLANTS AND GRAFTS: Chronic | ICD-10-CM

## 2018-07-17 DIAGNOSIS — Z98.891 HISTORY OF UTERINE SCAR FROM PREVIOUS SURGERY: Chronic | ICD-10-CM

## 2018-07-17 LAB
ALBUMIN SERPL ELPH-MCNC: 4.6 G/DL
ALP BLD-CCNC: 86 U/L
ALT SERPL-CCNC: 23 U/L
ANION GAP SERPL CALC-SCNC: 13 MMOL/L
AST SERPL-CCNC: 22 U/L
BILIRUB SERPL-MCNC: 0.3 MG/DL
BUN SERPL-MCNC: 18 MG/DL
CALCIUM SERPL-MCNC: 9.7 MG/DL
CHLORIDE SERPL-SCNC: 99 MMOL/L
CO2 SERPL-SCNC: 29 MMOL/L
CREAT SERPL-MCNC: 0.78 MG/DL
GLUCOSE SERPL-MCNC: 104 MG/DL
MAGNESIUM SERPL-MCNC: 1.6 MG/DL
POTASSIUM SERPL-SCNC: 4.5 MMOL/L
PROT SERPL-MCNC: 6.8 G/DL
SODIUM SERPL-SCNC: 141 MMOL/L

## 2018-07-18 ENCOUNTER — RX RENEWAL (OUTPATIENT)
Age: 58
End: 2018-07-18

## 2018-07-18 PROBLEM — D64.9 ANEMIA, UNSPECIFIED: Chronic | Status: ACTIVE | Noted: 2017-09-15

## 2018-07-18 PROBLEM — I10 ESSENTIAL (PRIMARY) HYPERTENSION: Chronic | Status: ACTIVE | Noted: 2017-09-15

## 2018-07-18 PROBLEM — D25.9 LEIOMYOMA OF UTERUS, UNSPECIFIED: Chronic | Status: ACTIVE | Noted: 2018-02-27

## 2018-07-23 ENCOUNTER — APPOINTMENT (OUTPATIENT)
Dept: HEMATOLOGY ONCOLOGY | Facility: CLINIC | Age: 58
End: 2018-07-23
Payer: COMMERCIAL

## 2018-07-23 VITALS
SYSTOLIC BLOOD PRESSURE: 147 MMHG | OXYGEN SATURATION: 97 % | HEIGHT: 62 IN | DIASTOLIC BLOOD PRESSURE: 86 MMHG | BODY MASS INDEX: 36.07 KG/M2 | WEIGHT: 195.99 LBS | HEART RATE: 83 BPM | TEMPERATURE: 98.2 F

## 2018-07-23 PROCEDURE — 99214 OFFICE O/P EST MOD 30 MIN: CPT

## 2018-07-25 ENCOUNTER — RESULT REVIEW (OUTPATIENT)
Age: 58
End: 2018-07-25

## 2018-07-25 ENCOUNTER — LABORATORY RESULT (OUTPATIENT)
Age: 58
End: 2018-07-25

## 2018-07-25 ENCOUNTER — APPOINTMENT (OUTPATIENT)
Dept: INFUSION THERAPY | Facility: CLINIC | Age: 58
End: 2018-07-25

## 2018-07-25 LAB
BASOPHILS # BLD AUTO: 0.1 K/UL — SIGNIFICANT CHANGE UP (ref 0–0.2)
BASOPHILS NFR BLD AUTO: 1 % — SIGNIFICANT CHANGE UP (ref 0–2)
EOSINOPHIL # BLD AUTO: 0.2 K/UL — SIGNIFICANT CHANGE UP (ref 0–0.5)
EOSINOPHIL NFR BLD AUTO: 3.2 % — SIGNIFICANT CHANGE UP (ref 0–6)
HCT VFR BLD CALC: 35.4 % — SIGNIFICANT CHANGE UP (ref 34.5–45)
HGB BLD-MCNC: 11.8 G/DL — SIGNIFICANT CHANGE UP (ref 11.5–15.5)
LYMPHOCYTES # BLD AUTO: 1.4 K/UL — SIGNIFICANT CHANGE UP (ref 1–3.3)
LYMPHOCYTES # BLD AUTO: 23.1 % — SIGNIFICANT CHANGE UP (ref 13–44)
MCHC RBC-ENTMCNC: 27.3 PG — SIGNIFICANT CHANGE UP (ref 27–34)
MCHC RBC-ENTMCNC: 33.2 GM/DL — SIGNIFICANT CHANGE UP (ref 32–36)
MCV RBC AUTO: 82.2 FL — SIGNIFICANT CHANGE UP (ref 80–100)
MONOCYTES # BLD AUTO: 0.4 K/UL — SIGNIFICANT CHANGE UP (ref 0–0.9)
MONOCYTES NFR BLD AUTO: 7.1 % — SIGNIFICANT CHANGE UP (ref 2–14)
NEUTROPHILS # BLD AUTO: 3.9 K/UL — SIGNIFICANT CHANGE UP (ref 1.8–7.4)
NEUTROPHILS NFR BLD AUTO: 65.7 % — SIGNIFICANT CHANGE UP (ref 43–77)
PLATELET # BLD AUTO: 236 K/UL — SIGNIFICANT CHANGE UP (ref 150–400)
RBC # BLD: 4.3 M/UL — SIGNIFICANT CHANGE UP (ref 3.8–5.2)
RBC # FLD: 12.6 % — SIGNIFICANT CHANGE UP (ref 10.3–14.5)
WBC # BLD: 5.9 K/UL — SIGNIFICANT CHANGE UP (ref 3.8–10.5)
WBC # FLD AUTO: 5.9 K/UL — SIGNIFICANT CHANGE UP (ref 3.8–10.5)

## 2018-07-26 DIAGNOSIS — Z51.11 ENCOUNTER FOR ANTINEOPLASTIC CHEMOTHERAPY: ICD-10-CM

## 2018-08-06 ENCOUNTER — OUTPATIENT (OUTPATIENT)
Dept: OUTPATIENT SERVICES | Facility: HOSPITAL | Age: 58
LOS: 1 days | End: 2018-08-06
Payer: COMMERCIAL

## 2018-08-06 VITALS
OXYGEN SATURATION: 100 % | DIASTOLIC BLOOD PRESSURE: 96 MMHG | HEIGHT: 61.5 IN | SYSTOLIC BLOOD PRESSURE: 141 MMHG | HEART RATE: 85 BPM | WEIGHT: 197.53 LBS | RESPIRATION RATE: 16 BRPM | TEMPERATURE: 98 F

## 2018-08-06 VITALS — HEIGHT: 61.5 IN | WEIGHT: 197.53 LBS

## 2018-08-06 DIAGNOSIS — Z98.891 HISTORY OF UTERINE SCAR FROM PREVIOUS SURGERY: Chronic | ICD-10-CM

## 2018-08-06 DIAGNOSIS — Z95.828 PRESENCE OF OTHER VASCULAR IMPLANTS AND GRAFTS: Chronic | ICD-10-CM

## 2018-08-06 DIAGNOSIS — Z85.3 PERSONAL HISTORY OF MALIGNANT NEOPLASM OF BREAST: ICD-10-CM

## 2018-08-06 DIAGNOSIS — Z90.13 ACQUIRED ABSENCE OF BILATERAL BREASTS AND NIPPLES: Chronic | ICD-10-CM

## 2018-08-06 DIAGNOSIS — Z98.890 OTHER SPECIFIED POSTPROCEDURAL STATES: Chronic | ICD-10-CM

## 2018-08-06 DIAGNOSIS — N64.9 DISORDER OF BREAST, UNSPECIFIED: ICD-10-CM

## 2018-08-06 DIAGNOSIS — Z01.818 ENCOUNTER FOR OTHER PREPROCEDURAL EXAMINATION: ICD-10-CM

## 2018-08-06 DIAGNOSIS — Z90.13 ACQUIRED ABSENCE OF BILATERAL BREASTS AND NIPPLES: ICD-10-CM

## 2018-08-06 DIAGNOSIS — Z90.710 ACQUIRED ABSENCE OF BOTH CERVIX AND UTERUS: Chronic | ICD-10-CM

## 2018-08-06 LAB
ANION GAP SERPL CALC-SCNC: 6 MMOL/L — SIGNIFICANT CHANGE UP (ref 5–17)
BUN SERPL-MCNC: 25 MG/DL — HIGH (ref 7–23)
CALCIUM SERPL-MCNC: 9.1 MG/DL — SIGNIFICANT CHANGE UP (ref 8.4–10.5)
CHLORIDE SERPL-SCNC: 103 MMOL/L — SIGNIFICANT CHANGE UP (ref 96–108)
CO2 SERPL-SCNC: 32 MMOL/L — HIGH (ref 22–31)
CREAT SERPL-MCNC: 0.76 MG/DL — SIGNIFICANT CHANGE UP (ref 0.5–1.3)
GLUCOSE SERPL-MCNC: 91 MG/DL — SIGNIFICANT CHANGE UP (ref 70–99)
POTASSIUM SERPL-MCNC: 3.6 MMOL/L — SIGNIFICANT CHANGE UP (ref 3.5–5.3)
POTASSIUM SERPL-SCNC: 3.6 MMOL/L — SIGNIFICANT CHANGE UP (ref 3.5–5.3)
SODIUM SERPL-SCNC: 141 MMOL/L — SIGNIFICANT CHANGE UP (ref 135–145)

## 2018-08-06 PROCEDURE — 93005 ELECTROCARDIOGRAM TRACING: CPT

## 2018-08-06 PROCEDURE — 93010 ELECTROCARDIOGRAM REPORT: CPT | Mod: NC

## 2018-08-06 PROCEDURE — 36415 COLL VENOUS BLD VENIPUNCTURE: CPT

## 2018-08-06 PROCEDURE — 80048 BASIC METABOLIC PNL TOTAL CA: CPT

## 2018-08-06 PROCEDURE — G0463: CPT

## 2018-08-06 RX ORDER — CHOLECALCIFEROL (VITAMIN D3) 125 MCG
1 CAPSULE ORAL
Qty: 0 | Refills: 0 | COMMUNITY

## 2018-08-06 NOTE — H&P PST ADULT - PSH
H/O bilateral mastectomy  With BUNNY flap 2018  History of  section  1994  History of hysterectomy  2013  History of oral surgery    Port-a-cath in place  chemoport left chest wall 2017  Status post endometrial ablation  2013

## 2018-08-06 NOTE — H&P PST ADULT - PMH
Anemia    Breast cancer  Right Dx 8/2017  Breast complaint  Absent breast nipples  Fibroids    Hypertension

## 2018-08-06 NOTE — H&P PST ADULT - RS GEN PE MLT RESP DETAILS PC
clear to auscultation bilaterally/airway patent/good air movement/breath sounds equal/normal/respirations non-labored

## 2018-08-06 NOTE — ASU PATIENT PROFILE, ADULT - PRO INTERPRETER NEED 2
Amy Elizondo  : 1920  [place] / [place] / Product Type: Medicare /  [address]  at [facility]  [address] [place] [address] [address]  [phone]   [phone]      OUTPATIENT DAILY NOTE    NAME/AGE/GENDER: Amy Elizondo is a 82 y.o. female. DATE: 2017    Patient cancelled  appointment today due to schedule conflicit. Will plan to follow up on next scheduled visit.     Urban Miller, PTA
English

## 2018-08-06 NOTE — H&P PST ADULT - HISTORY OF PRESENT ILLNESS
58 y/o female presents for PST. As per patient she was diagnosed with right breast cancer 08/2018 and had bilateral mastectomy done with BUNNY flap. Patient is now returning for bilateral breast revision surgery.

## 2018-08-07 ENCOUNTER — APPOINTMENT (OUTPATIENT)
Dept: SURGERY | Facility: CLINIC | Age: 58
End: 2018-08-07
Payer: COMMERCIAL

## 2018-08-07 VITALS
TEMPERATURE: 98.9 F | HEART RATE: 85 BPM | WEIGHT: 199 LBS | DIASTOLIC BLOOD PRESSURE: 92 MMHG | HEIGHT: 62 IN | BODY MASS INDEX: 36.62 KG/M2 | OXYGEN SATURATION: 97 % | SYSTOLIC BLOOD PRESSURE: 135 MMHG

## 2018-08-07 PROBLEM — N64.9 DISORDER OF BREAST, UNSPECIFIED: Chronic | Status: ACTIVE | Noted: 2018-08-06

## 2018-08-07 PROCEDURE — 99214 OFFICE O/P EST MOD 30 MIN: CPT

## 2018-08-13 ENCOUNTER — TRANSCRIPTION ENCOUNTER (OUTPATIENT)
Age: 58
End: 2018-08-13

## 2018-08-14 ENCOUNTER — RESULT REVIEW (OUTPATIENT)
Age: 58
End: 2018-08-14

## 2018-08-14 ENCOUNTER — OUTPATIENT (OUTPATIENT)
Dept: OUTPATIENT SERVICES | Facility: HOSPITAL | Age: 58
LOS: 1 days | End: 2018-08-14
Payer: COMMERCIAL

## 2018-08-14 VITALS
SYSTOLIC BLOOD PRESSURE: 128 MMHG | HEART RATE: 80 BPM | RESPIRATION RATE: 16 BRPM | TEMPERATURE: 97 F | OXYGEN SATURATION: 96 % | DIASTOLIC BLOOD PRESSURE: 76 MMHG

## 2018-08-14 VITALS
HEIGHT: 61.5 IN | OXYGEN SATURATION: 100 % | TEMPERATURE: 98 F | HEART RATE: 79 BPM | DIASTOLIC BLOOD PRESSURE: 72 MMHG | SYSTOLIC BLOOD PRESSURE: 134 MMHG | RESPIRATION RATE: 16 BRPM | WEIGHT: 197.53 LBS

## 2018-08-14 DIAGNOSIS — Z98.890 OTHER SPECIFIED POSTPROCEDURAL STATES: Chronic | ICD-10-CM

## 2018-08-14 DIAGNOSIS — Z85.3 PERSONAL HISTORY OF MALIGNANT NEOPLASM OF BREAST: ICD-10-CM

## 2018-08-14 DIAGNOSIS — Z90.710 ACQUIRED ABSENCE OF BOTH CERVIX AND UTERUS: Chronic | ICD-10-CM

## 2018-08-14 DIAGNOSIS — Z90.13 ACQUIRED ABSENCE OF BILATERAL BREASTS AND NIPPLES: Chronic | ICD-10-CM

## 2018-08-14 DIAGNOSIS — Z98.891 HISTORY OF UTERINE SCAR FROM PREVIOUS SURGERY: Chronic | ICD-10-CM

## 2018-08-14 DIAGNOSIS — Z95.828 PRESENCE OF OTHER VASCULAR IMPLANTS AND GRAFTS: Chronic | ICD-10-CM

## 2018-08-14 DIAGNOSIS — Z90.13 ACQUIRED ABSENCE OF BILATERAL BREASTS AND NIPPLES: ICD-10-CM

## 2018-08-14 PROCEDURE — 19380 REVJ RECONSTRUCTED BREAST: CPT | Mod: 50

## 2018-08-14 PROCEDURE — 13101 CMPLX RPR TRUNK 2.6-7.5 CM: CPT | Mod: XS

## 2018-08-14 PROCEDURE — 88304 TISSUE EXAM BY PATHOLOGIST: CPT | Mod: 26

## 2018-08-14 PROCEDURE — 13102 CMPLX RPR TRUNK ADDL 5CM/<: CPT | Mod: XS

## 2018-08-14 PROCEDURE — 19350 NIPPLE/AREOLA RECONSTRUCTION: CPT | Mod: 50

## 2018-08-14 PROCEDURE — 88304 TISSUE EXAM BY PATHOLOGIST: CPT

## 2018-08-14 RX ORDER — OXYCODONE AND ACETAMINOPHEN 5; 325 MG/1; MG/1
1 TABLET ORAL EVERY 4 HOURS
Qty: 0 | Refills: 0 | Status: DISCONTINUED | OUTPATIENT
Start: 2018-08-14 | End: 2018-08-14

## 2018-08-14 RX ORDER — SODIUM CHLORIDE 9 MG/ML
1000 INJECTION, SOLUTION INTRAVENOUS
Qty: 0 | Refills: 0 | Status: DISCONTINUED | OUTPATIENT
Start: 2018-08-14 | End: 2018-08-14

## 2018-08-14 RX ORDER — HYDROMORPHONE HYDROCHLORIDE 2 MG/ML
0.5 INJECTION INTRAMUSCULAR; INTRAVENOUS; SUBCUTANEOUS
Qty: 0 | Refills: 0 | Status: DISCONTINUED | OUTPATIENT
Start: 2018-08-14 | End: 2018-08-14

## 2018-08-14 RX ORDER — ONDANSETRON 8 MG/1
4 TABLET, FILM COATED ORAL ONCE
Qty: 0 | Refills: 0 | Status: DISCONTINUED | OUTPATIENT
Start: 2018-08-14 | End: 2018-08-14

## 2018-08-14 RX ORDER — SODIUM CHLORIDE 9 MG/ML
1000 INJECTION, SOLUTION INTRAVENOUS
Qty: 0 | Refills: 0 | Status: DISCONTINUED | OUTPATIENT
Start: 2018-08-14 | End: 2018-08-29

## 2018-08-14 NOTE — ASU PATIENT PROFILE, ADULT - VISION (WITH CORRECTIVE LENSES IF THE PATIENT USUALLY WEARS THEM):
reading glasses only/Normal vision: sees adequately in most situations; can see medication labels, newsprint

## 2018-08-14 NOTE — ASU DISCHARGE PLAN (ADULT/PEDIATRIC). - NOTIFY
Fever greater than 101/Swelling that continues/Bleeding that does not stop/Pain not relieved by Medications

## 2018-08-14 NOTE — ASU DISCHARGE PLAN (ADULT/PEDIATRIC). - MEDICATION SUMMARY - MEDICATIONS TO TAKE
I will START or STAY ON the medications listed below when I get home from the hospital:    Tylenol 500 mg oral tablet  -- 2 tab(s) by mouth , As Needed  -- Indication: For Acquired absence of both breasts and nipples    olmesartan-hydrochlorothiazide 40 mg-25 mg oral tablet  -- 1 tab(s) by mouth once a day  -- Indication: For Acquired absence of both breasts and nipples    Herceptin  -- intravenous every 3 weeks  -- Indication: For Acquired absence of both breasts and nipples    magnesium gluconate 500 mg oral tablet  -- 1 tab(s) by mouth 2 times a day  -- Indication: For Acquired absence of both breasts and nipples    potassium chloride 20 mEq oral tablet, extended release  -- 1 tab(s) by mouth once a day  -- Indication: For Acquired absence of both breasts and nipples

## 2018-08-14 NOTE — BRIEF OPERATIVE NOTE - PROCEDURE
<<-----Click on this checkbox to enter Procedure Breast revision surgery  08/14/2018  Bilateral breast revision/reconstruction, B/L BOBBY, Abdominal scar revision  Active  KSTROBEL

## 2018-08-15 LAB — SURGICAL PATHOLOGY STUDY: SIGNIFICANT CHANGE UP

## 2018-08-16 ENCOUNTER — APPOINTMENT (OUTPATIENT)
Dept: INFUSION THERAPY | Facility: CLINIC | Age: 58
End: 2018-08-16

## 2018-08-16 ENCOUNTER — LABORATORY RESULT (OUTPATIENT)
Age: 58
End: 2018-08-16

## 2018-08-16 ENCOUNTER — RESULT REVIEW (OUTPATIENT)
Age: 58
End: 2018-08-16

## 2018-08-16 LAB
BASOPHILS # BLD AUTO: 0.1 K/UL — SIGNIFICANT CHANGE UP (ref 0–0.2)
BASOPHILS NFR BLD AUTO: 0.9 % — SIGNIFICANT CHANGE UP (ref 0–2)
EOSINOPHIL # BLD AUTO: 0.1 K/UL — SIGNIFICANT CHANGE UP (ref 0–0.5)
EOSINOPHIL NFR BLD AUTO: 2 % — SIGNIFICANT CHANGE UP (ref 0–6)
HCT VFR BLD CALC: 28.3 % — LOW (ref 34.5–45)
HGB BLD-MCNC: 9.7 G/DL — LOW (ref 11.5–15.5)
LYMPHOCYTES # BLD AUTO: 1.5 K/UL — SIGNIFICANT CHANGE UP (ref 1–3.3)
LYMPHOCYTES # BLD AUTO: 23.9 % — SIGNIFICANT CHANGE UP (ref 13–44)
MCHC RBC-ENTMCNC: 28.6 PG — SIGNIFICANT CHANGE UP (ref 27–34)
MCHC RBC-ENTMCNC: 34.3 GM/DL — SIGNIFICANT CHANGE UP (ref 32–36)
MCV RBC AUTO: 83.4 FL — SIGNIFICANT CHANGE UP (ref 80–100)
MONOCYTES # BLD AUTO: 0.6 K/UL — SIGNIFICANT CHANGE UP (ref 0–0.9)
MONOCYTES NFR BLD AUTO: 10.2 % — SIGNIFICANT CHANGE UP (ref 2–14)
NEUTROPHILS # BLD AUTO: 3.9 K/UL — SIGNIFICANT CHANGE UP (ref 1.8–7.4)
NEUTROPHILS NFR BLD AUTO: 63 % — SIGNIFICANT CHANGE UP (ref 43–77)
PLATELET # BLD AUTO: 214 K/UL — SIGNIFICANT CHANGE UP (ref 150–400)
RBC # BLD: 3.39 M/UL — LOW (ref 3.8–5.2)
RBC # FLD: 13 % — SIGNIFICANT CHANGE UP (ref 10.3–14.5)
WBC # BLD: 6.1 K/UL — SIGNIFICANT CHANGE UP (ref 3.8–10.5)
WBC # FLD AUTO: 6.1 K/UL — SIGNIFICANT CHANGE UP (ref 3.8–10.5)

## 2018-08-17 NOTE — H&P PST ADULT - FAMILY HISTORY
Father  Still living? No  Family history of lung cancer, Age at diagnosis: 71-80     Mother  Still living? No  Family history of lung cancer, Age at diagnosis: 61-70     Child  Still living? Yes, Estimated age: Age Unknown  Family history of fibromyalgia, Age at diagnosis: Age Unknown
Out of season

## 2018-08-21 ENCOUNTER — OUTPATIENT (OUTPATIENT)
Dept: OUTPATIENT SERVICES | Facility: HOSPITAL | Age: 58
LOS: 1 days | Discharge: HOME | End: 2018-08-21

## 2018-08-21 DIAGNOSIS — C50.919 MALIGNANT NEOPLASM OF UNSPECIFIED SITE OF UNSPECIFIED FEMALE BREAST: ICD-10-CM

## 2018-08-21 DIAGNOSIS — Z98.891 HISTORY OF UTERINE SCAR FROM PREVIOUS SURGERY: Chronic | ICD-10-CM

## 2018-08-21 DIAGNOSIS — Z95.828 PRESENCE OF OTHER VASCULAR IMPLANTS AND GRAFTS: Chronic | ICD-10-CM

## 2018-08-21 DIAGNOSIS — Z90.13 ACQUIRED ABSENCE OF BILATERAL BREASTS AND NIPPLES: Chronic | ICD-10-CM

## 2018-08-21 DIAGNOSIS — Z90.710 ACQUIRED ABSENCE OF BOTH CERVIX AND UTERUS: Chronic | ICD-10-CM

## 2018-08-21 DIAGNOSIS — Z98.890 OTHER SPECIFIED POSTPROCEDURAL STATES: Chronic | ICD-10-CM

## 2018-09-04 ENCOUNTER — APPOINTMENT (OUTPATIENT)
Dept: HEMATOLOGY ONCOLOGY | Facility: CLINIC | Age: 58
End: 2018-09-04
Payer: COMMERCIAL

## 2018-09-04 ENCOUNTER — LABORATORY RESULT (OUTPATIENT)
Age: 58
End: 2018-09-04

## 2018-09-04 ENCOUNTER — RESULT REVIEW (OUTPATIENT)
Age: 58
End: 2018-09-04

## 2018-09-04 ENCOUNTER — APPOINTMENT (OUTPATIENT)
Dept: INFUSION THERAPY | Facility: CLINIC | Age: 58
End: 2018-09-04

## 2018-09-04 VITALS
BODY MASS INDEX: 37.06 KG/M2 | HEIGHT: 62 IN | TEMPERATURE: 98.3 F | SYSTOLIC BLOOD PRESSURE: 138 MMHG | WEIGHT: 201.39 LBS | OXYGEN SATURATION: 100 % | HEART RATE: 82 BPM | DIASTOLIC BLOOD PRESSURE: 85 MMHG

## 2018-09-04 LAB
BASOPHILS # BLD AUTO: 0.1 K/UL — SIGNIFICANT CHANGE UP (ref 0–0.2)
BASOPHILS NFR BLD AUTO: 1.1 % — SIGNIFICANT CHANGE UP (ref 0–2)
EOSINOPHIL # BLD AUTO: 0.2 K/UL — SIGNIFICANT CHANGE UP (ref 0–0.5)
EOSINOPHIL NFR BLD AUTO: 2.9 % — SIGNIFICANT CHANGE UP (ref 0–6)
HCT VFR BLD CALC: 33.4 % — LOW (ref 34.5–45)
HGB BLD-MCNC: 11.2 G/DL — LOW (ref 11.5–15.5)
LYMPHOCYTES # BLD AUTO: 1.3 K/UL — SIGNIFICANT CHANGE UP (ref 1–3.3)
LYMPHOCYTES # BLD AUTO: 24.3 % — SIGNIFICANT CHANGE UP (ref 13–44)
MCHC RBC-ENTMCNC: 28.3 PG — SIGNIFICANT CHANGE UP (ref 27–34)
MCHC RBC-ENTMCNC: 33.6 GM/DL — SIGNIFICANT CHANGE UP (ref 32–36)
MCV RBC AUTO: 84.2 FL — SIGNIFICANT CHANGE UP (ref 80–100)
MONOCYTES # BLD AUTO: 0.4 K/UL — SIGNIFICANT CHANGE UP (ref 0–0.9)
MONOCYTES NFR BLD AUTO: 7.7 % — SIGNIFICANT CHANGE UP (ref 2–14)
NEUTROPHILS # BLD AUTO: 3.4 K/UL — SIGNIFICANT CHANGE UP (ref 1.8–7.4)
NEUTROPHILS NFR BLD AUTO: 64 % — SIGNIFICANT CHANGE UP (ref 43–77)
PLATELET # BLD AUTO: 258 K/UL — SIGNIFICANT CHANGE UP (ref 150–400)
RBC # BLD: 3.97 M/UL — SIGNIFICANT CHANGE UP (ref 3.8–5.2)
RBC # FLD: 13.1 % — SIGNIFICANT CHANGE UP (ref 10.3–14.5)
WBC # BLD: 5.3 K/UL — SIGNIFICANT CHANGE UP (ref 3.8–10.5)
WBC # FLD AUTO: 5.3 K/UL — SIGNIFICANT CHANGE UP (ref 3.8–10.5)

## 2018-09-04 PROCEDURE — 99214 OFFICE O/P EST MOD 30 MIN: CPT

## 2018-09-06 DIAGNOSIS — Z51.11 ENCOUNTER FOR ANTINEOPLASTIC CHEMOTHERAPY: ICD-10-CM

## 2018-09-12 ENCOUNTER — RX RENEWAL (OUTPATIENT)
Age: 58
End: 2018-09-12

## 2018-09-12 RX ORDER — POTASSIUM CHLORIDE 1500 MG/1
20 TABLET, FILM COATED, EXTENDED RELEASE ORAL
Qty: 60 | Refills: 0 | Status: DISCONTINUED | COMMUNITY
Start: 2018-06-14 | End: 2018-09-12

## 2018-09-18 ENCOUNTER — OUTPATIENT (OUTPATIENT)
Dept: OUTPATIENT SERVICES | Facility: HOSPITAL | Age: 58
LOS: 1 days | Discharge: HOME | End: 2018-09-18

## 2018-09-18 ENCOUNTER — APPOINTMENT (OUTPATIENT)
Dept: SURGERY | Facility: CLINIC | Age: 58
End: 2018-09-18
Payer: COMMERCIAL

## 2018-09-18 VITALS
BODY MASS INDEX: 37.17 KG/M2 | TEMPERATURE: 98.4 F | HEIGHT: 62 IN | OXYGEN SATURATION: 97 % | DIASTOLIC BLOOD PRESSURE: 83 MMHG | SYSTOLIC BLOOD PRESSURE: 136 MMHG | WEIGHT: 202 LBS | HEART RATE: 82 BPM

## 2018-09-18 DIAGNOSIS — Z98.891 HISTORY OF UTERINE SCAR FROM PREVIOUS SURGERY: Chronic | ICD-10-CM

## 2018-09-18 DIAGNOSIS — Z98.890 OTHER SPECIFIED POSTPROCEDURAL STATES: Chronic | ICD-10-CM

## 2018-09-18 DIAGNOSIS — Z95.828 PRESENCE OF OTHER VASCULAR IMPLANTS AND GRAFTS: Chronic | ICD-10-CM

## 2018-09-18 DIAGNOSIS — C50.919 MALIGNANT NEOPLASM OF UNSPECIFIED SITE OF UNSPECIFIED FEMALE BREAST: ICD-10-CM

## 2018-09-18 DIAGNOSIS — Z90.13 ACQUIRED ABSENCE OF BILATERAL BREASTS AND NIPPLES: Chronic | ICD-10-CM

## 2018-09-18 DIAGNOSIS — Z90.710 ACQUIRED ABSENCE OF BOTH CERVIX AND UTERUS: Chronic | ICD-10-CM

## 2018-09-18 PROCEDURE — 99214 OFFICE O/P EST MOD 30 MIN: CPT

## 2018-09-25 ENCOUNTER — RESULT REVIEW (OUTPATIENT)
Age: 58
End: 2018-09-25

## 2018-09-25 ENCOUNTER — LABORATORY RESULT (OUTPATIENT)
Age: 58
End: 2018-09-25

## 2018-09-25 ENCOUNTER — APPOINTMENT (OUTPATIENT)
Dept: INFUSION THERAPY | Facility: CLINIC | Age: 58
End: 2018-09-25

## 2018-09-25 LAB
BASOPHILS # BLD AUTO: 0 K/UL — SIGNIFICANT CHANGE UP (ref 0–0.2)
BASOPHILS NFR BLD AUTO: 0.9 % — SIGNIFICANT CHANGE UP (ref 0–2)
EOSINOPHIL # BLD AUTO: 0.1 K/UL — SIGNIFICANT CHANGE UP (ref 0–0.5)
EOSINOPHIL NFR BLD AUTO: 2.1 % — SIGNIFICANT CHANGE UP (ref 0–6)
HCT VFR BLD CALC: 36.6 % — SIGNIFICANT CHANGE UP (ref 34.5–45)
HGB BLD-MCNC: 12.2 G/DL — SIGNIFICANT CHANGE UP (ref 11.5–15.5)
LYMPHOCYTES # BLD AUTO: 1.3 K/UL — SIGNIFICANT CHANGE UP (ref 1–3.3)
LYMPHOCYTES # BLD AUTO: 22.8 % — SIGNIFICANT CHANGE UP (ref 13–44)
MCHC RBC-ENTMCNC: 27.9 PG — SIGNIFICANT CHANGE UP (ref 27–34)
MCHC RBC-ENTMCNC: 33.5 GM/DL — SIGNIFICANT CHANGE UP (ref 32–36)
MCV RBC AUTO: 83.3 FL — SIGNIFICANT CHANGE UP (ref 80–100)
MONOCYTES # BLD AUTO: 0.4 K/UL — SIGNIFICANT CHANGE UP (ref 0–0.9)
MONOCYTES NFR BLD AUTO: 6.9 % — SIGNIFICANT CHANGE UP (ref 2–14)
NEUTROPHILS # BLD AUTO: 3.9 K/UL — SIGNIFICANT CHANGE UP (ref 1.8–7.4)
NEUTROPHILS NFR BLD AUTO: 67.4 % — SIGNIFICANT CHANGE UP (ref 43–77)
PLATELET # BLD AUTO: 253 K/UL — SIGNIFICANT CHANGE UP (ref 150–400)
RBC # BLD: 4.39 M/UL — SIGNIFICANT CHANGE UP (ref 3.8–5.2)
RBC # FLD: 12 % — SIGNIFICANT CHANGE UP (ref 10.3–14.5)
WBC # BLD: 5.8 K/UL — SIGNIFICANT CHANGE UP (ref 3.8–10.5)
WBC # FLD AUTO: 5.8 K/UL — SIGNIFICANT CHANGE UP (ref 3.8–10.5)

## 2018-09-25 NOTE — ASU PATIENT PROFILE, ADULT - NS PRO ABUSE SCREEN AFRAID ANYONE YN
[facility] [employer] regarding benefit verification for Percutaneous Tibial Nerve Stimulation (PTNS)  Per Cely Weebr, , CPT code 23486 is a covered benefit  Medicare is PRIMARY and HOP is SECONDARY  HOP is set to receive automatic claims directly from Medicare and if Medicare pays, they will cover as secondary (20%)  No prior authorization is required 
Spoke with pt and told her request going to authorization office to check her secondary insurance to coverage for PTNS txs  She has appt on 9-25-18 tp start PTNS txs, and would like a call with the answer when we have it  Thanks        Spoke with pt and gave appt next month to start PTNS txs ----- Message from Haskel Chain sent at 8/27/2018  9:46 AM EDT -----  Regarding: ptns appts  Contact: [phone]  Pt needs to set up PTNS appts
no

## 2018-09-26 DIAGNOSIS — Z51.11 ENCOUNTER FOR ANTINEOPLASTIC CHEMOTHERAPY: ICD-10-CM

## 2018-10-16 ENCOUNTER — LABORATORY RESULT (OUTPATIENT)
Age: 58
End: 2018-10-16

## 2018-10-16 ENCOUNTER — RESULT REVIEW (OUTPATIENT)
Age: 58
End: 2018-10-16

## 2018-10-16 ENCOUNTER — APPOINTMENT (OUTPATIENT)
Dept: HEMATOLOGY ONCOLOGY | Facility: CLINIC | Age: 58
End: 2018-10-16
Payer: COMMERCIAL

## 2018-10-16 ENCOUNTER — APPOINTMENT (OUTPATIENT)
Dept: INFUSION THERAPY | Facility: CLINIC | Age: 58
End: 2018-10-16

## 2018-10-16 VITALS
HEART RATE: 81 BPM | BODY MASS INDEX: 37.71 KG/M2 | DIASTOLIC BLOOD PRESSURE: 85 MMHG | WEIGHT: 204.92 LBS | HEIGHT: 62 IN | SYSTOLIC BLOOD PRESSURE: 126 MMHG | TEMPERATURE: 98.3 F | OXYGEN SATURATION: 97 %

## 2018-10-16 LAB
BASOPHILS # BLD AUTO: 0.1 K/UL — SIGNIFICANT CHANGE UP (ref 0–0.2)
BASOPHILS NFR BLD AUTO: 1.4 % — SIGNIFICANT CHANGE UP (ref 0–2)
EOSINOPHIL # BLD AUTO: 0.2 K/UL — SIGNIFICANT CHANGE UP (ref 0–0.5)
EOSINOPHIL NFR BLD AUTO: 4.1 % — SIGNIFICANT CHANGE UP (ref 0–6)
HCT VFR BLD CALC: 36.9 % — SIGNIFICANT CHANGE UP (ref 34.5–45)
HGB BLD-MCNC: 12.3 G/DL — SIGNIFICANT CHANGE UP (ref 11.5–15.5)
LYMPHOCYTES # BLD AUTO: 1.4 K/UL — SIGNIFICANT CHANGE UP (ref 1–3.3)
LYMPHOCYTES # BLD AUTO: 24.8 % — SIGNIFICANT CHANGE UP (ref 13–44)
MCHC RBC-ENTMCNC: 27.6 PG — SIGNIFICANT CHANGE UP (ref 27–34)
MCHC RBC-ENTMCNC: 33.3 GM/DL — SIGNIFICANT CHANGE UP (ref 32–36)
MCV RBC AUTO: 83 FL — SIGNIFICANT CHANGE UP (ref 80–100)
MONOCYTES # BLD AUTO: 0.5 K/UL — SIGNIFICANT CHANGE UP (ref 0–0.9)
MONOCYTES NFR BLD AUTO: 8 % — SIGNIFICANT CHANGE UP (ref 2–14)
NEUTROPHILS # BLD AUTO: 3.5 K/UL — SIGNIFICANT CHANGE UP (ref 1.8–7.4)
NEUTROPHILS NFR BLD AUTO: 61.7 % — SIGNIFICANT CHANGE UP (ref 43–77)
PLATELET # BLD AUTO: 267 K/UL — SIGNIFICANT CHANGE UP (ref 150–400)
RBC # BLD: 4.45 M/UL — SIGNIFICANT CHANGE UP (ref 3.8–5.2)
RBC # FLD: 11.4 % — SIGNIFICANT CHANGE UP (ref 10.3–14.5)
WBC # BLD: 5.7 K/UL — SIGNIFICANT CHANGE UP (ref 3.8–10.5)
WBC # FLD AUTO: 5.7 K/UL — SIGNIFICANT CHANGE UP (ref 3.8–10.5)

## 2018-10-16 PROCEDURE — 99214 OFFICE O/P EST MOD 30 MIN: CPT

## 2018-10-29 ENCOUNTER — OUTPATIENT (OUTPATIENT)
Dept: OUTPATIENT SERVICES | Facility: HOSPITAL | Age: 58
LOS: 1 days | Discharge: HOME | End: 2018-10-29

## 2018-10-29 DIAGNOSIS — C50.919 MALIGNANT NEOPLASM OF UNSPECIFIED SITE OF UNSPECIFIED FEMALE BREAST: ICD-10-CM

## 2018-10-29 DIAGNOSIS — Z98.890 OTHER SPECIFIED POSTPROCEDURAL STATES: Chronic | ICD-10-CM

## 2018-10-29 DIAGNOSIS — Z90.710 ACQUIRED ABSENCE OF BOTH CERVIX AND UTERUS: Chronic | ICD-10-CM

## 2018-10-29 DIAGNOSIS — Z90.13 ACQUIRED ABSENCE OF BILATERAL BREASTS AND NIPPLES: Chronic | ICD-10-CM

## 2018-10-29 DIAGNOSIS — Z95.828 PRESENCE OF OTHER VASCULAR IMPLANTS AND GRAFTS: Chronic | ICD-10-CM

## 2018-10-29 DIAGNOSIS — Z98.891 HISTORY OF UTERINE SCAR FROM PREVIOUS SURGERY: Chronic | ICD-10-CM

## 2018-10-31 ENCOUNTER — RX RENEWAL (OUTPATIENT)
Age: 58
End: 2018-10-31

## 2018-11-06 ENCOUNTER — LABORATORY RESULT (OUTPATIENT)
Age: 58
End: 2018-11-06

## 2018-11-06 ENCOUNTER — RESULT REVIEW (OUTPATIENT)
Age: 58
End: 2018-11-06

## 2018-11-06 ENCOUNTER — APPOINTMENT (OUTPATIENT)
Dept: INFUSION THERAPY | Facility: CLINIC | Age: 58
End: 2018-11-06

## 2018-11-06 LAB
BASOPHILS # BLD AUTO: 0.1 K/UL — SIGNIFICANT CHANGE UP (ref 0–0.2)
BASOPHILS NFR BLD AUTO: 0.9 % — SIGNIFICANT CHANGE UP (ref 0–2)
EOSINOPHIL # BLD AUTO: 0.2 K/UL — SIGNIFICANT CHANGE UP (ref 0–0.5)
EOSINOPHIL NFR BLD AUTO: 3.7 % — SIGNIFICANT CHANGE UP (ref 0–6)
HCT VFR BLD CALC: 37.1 % — SIGNIFICANT CHANGE UP (ref 34.5–45)
HGB BLD-MCNC: 12.2 G/DL — SIGNIFICANT CHANGE UP (ref 11.5–15.5)
LYMPHOCYTES # BLD AUTO: 1.5 K/UL — SIGNIFICANT CHANGE UP (ref 1–3.3)
LYMPHOCYTES # BLD AUTO: 23 % — SIGNIFICANT CHANGE UP (ref 13–44)
MCHC RBC-ENTMCNC: 26.8 PG — LOW (ref 27–34)
MCHC RBC-ENTMCNC: 32.9 GM/DL — SIGNIFICANT CHANGE UP (ref 32–36)
MCV RBC AUTO: 81.4 FL — SIGNIFICANT CHANGE UP (ref 80–100)
MONOCYTES # BLD AUTO: 0.6 K/UL — SIGNIFICANT CHANGE UP (ref 0–0.9)
MONOCYTES NFR BLD AUTO: 8.7 % — SIGNIFICANT CHANGE UP (ref 2–14)
NEUTROPHILS # BLD AUTO: 4 K/UL — SIGNIFICANT CHANGE UP (ref 1.8–7.4)
NEUTROPHILS NFR BLD AUTO: 63.6 % — SIGNIFICANT CHANGE UP (ref 43–77)
PLATELET # BLD AUTO: 272 K/UL — SIGNIFICANT CHANGE UP (ref 150–400)
RBC # BLD: 4.55 M/UL — SIGNIFICANT CHANGE UP (ref 3.8–5.2)
RBC # FLD: 11.5 % — SIGNIFICANT CHANGE UP (ref 10.3–14.5)
WBC # BLD: 6.3 K/UL — SIGNIFICANT CHANGE UP (ref 3.8–10.5)
WBC # FLD AUTO: 6.3 K/UL — SIGNIFICANT CHANGE UP (ref 3.8–10.5)

## 2018-11-07 DIAGNOSIS — Z51.11 ENCOUNTER FOR ANTINEOPLASTIC CHEMOTHERAPY: ICD-10-CM

## 2018-11-09 ENCOUNTER — RX RENEWAL (OUTPATIENT)
Age: 58
End: 2018-11-09

## 2018-11-26 ENCOUNTER — APPOINTMENT (OUTPATIENT)
Dept: INFUSION THERAPY | Facility: CLINIC | Age: 58
End: 2018-11-26

## 2018-11-26 ENCOUNTER — APPOINTMENT (OUTPATIENT)
Dept: HEMATOLOGY ONCOLOGY | Facility: CLINIC | Age: 58
End: 2018-11-26
Payer: COMMERCIAL

## 2018-11-26 ENCOUNTER — LABORATORY RESULT (OUTPATIENT)
Age: 58
End: 2018-11-26

## 2018-11-26 DIAGNOSIS — E83.42 HYPOMAGNESEMIA: ICD-10-CM

## 2018-11-26 PROCEDURE — 99214 OFFICE O/P EST MOD 30 MIN: CPT

## 2018-11-26 NOTE — PHYSICAL EXAM
[Restricted in physically strenuous activity but ambulatory and able to carry out work of a light or sedentary nature] : Status 1- Restricted in physically strenuous activity but ambulatory and able to carry out work of a light or sedentary nature, e.g., light house work, office work [Normal] : affect appropriate [de-identified] : supple [de-identified] : No edema [de-identified] : BS+, soft,

## 2018-11-26 NOTE — HISTORY OF PRESENT ILLNESS
[Disease: _____________________] : Disease: [unfilled] [T: ___] : T[unfilled] [de-identified] : Ms. Young was diagnosed with HER2 positive right breast cancer at age 56 in August 2017.\par \par She initially presented with right nipple yellowish discharge.  Subsequent mammogram and ultrasound on 7/27/17 showed new spiculated mass in inner central posterior right breast with sonographic confirmation of suspicious mass at 3-4:00 region, and an indeterminate hypoechoic nodule at 1:00 right breast.\par \par On 8/3/17 she had right breast core biopsy of 3-4:00 mass --> pathology showed invasive poorly differentiated ductal carcinoma, Enzo grade 8/9, 0.9 cm--> ER-, WV-, HER2 IHC 3+ (positive)\par Right breast 1:00 --> fibrocystic change, proliferative type with nodular sclerosing adenosis\par \par 8/21/17 MRI breast -   Biopsy  clip  and  3.8  cm  area  of  irregular  non  mass  enhancement  in  the right  breast  at  the  3-4:00  location  posteriorly,  corresponding  to  recently\par diagnosed  invasive  carcinoma. Additional  irregular  areas  of  enhancement  in  the  right  breast  at  the 2-3:00  location  and  8-9:00  location,  suspicious  for  additional disease. Representative  MR  guided  core  biopsy  of  enhancement  at  the  8-9:00  location can  be  performed  to  evaluate  for  multicentric  disease,  if  it  would  change\par management. 8  mm  enhancing  nodule  demonstrating  washout  kinetics  in  the  upper slightly  inner  mid  left  breast.  MR  guided  core  biopsy  is  advised.\par Clip  artifact  in  the  right  breast  at  the  1:00  location  marking  site  of biopsy-proven  radial  scar.  No  associated  suspicious  enhancement.  Recommend\par appropriate  surgical  management.  2.1  cm  oval  T2  bright  lesion  in  the  liver,  probable  cyst  or  hemangioma.\par Recommend  dedicated  liver  ultrasound  for  further  evaluation.\par \par 8/28/17 MRI Core biopsy right breast lower outer quadrant --> DCIS, solid pattern, high grade atypia, focal necrosis. ER/WV pending\par Core biopsy left breast central inner --> mild proliferative fibrocystic change\par \par Treatment Summary\par 9/21/17 port placement\par 9/20/17 - 1/3/18  TCHP x 6 cycles (9/30/17 - 1/03/18\par 5/03/18 Started on maintenance Trastuzumab (Herceptin) X every 3 weeks, 52 weeks.\par She had breast reconstruction on 8/14/18 with nipple reconstruction.\par \par 3/14/18 - She underwent right skin sparing mastectomy with SNL biopsy and left risk reduction mastectomy with BUNNY reconstruction.\par 1- Left breast, mastectomy- Fibrocystic changes\par 2- Right sentinel lymph node, excision- Three negative lymph nodes (0/3)\par 3- Right sentinel lymph node 2, excision- One negative lymph node (0/1)\par 4- Right breast, mastectomy- No residual invasive carcinoma (s/p neoadjuvant treatment)- No residual DCIS (s/p neoadjuvant treatment)\par  Pathologic staging: ypT0, ypN0\par \par  [de-identified] : HER2 positive [de-identified] : Here for for follow up. Continues to take KCL + Mag.  She was taking Benicar (olmesartan+HCTZ combo) and was taken off HCTZ due to hypokalemia, hypomagnesemia.\par Doing well very well. No complaints. \par Energy level is fair.  Denies fevers, n/v, diarrhea or constipation. No SOB or CP, or  LE edema. the remainder of ROS is negative.\par

## 2018-11-26 NOTE — ASSESSMENT
[FreeTextEntry1] : 58 year old postmenopausal female with stage IIA (T2) HER2 positive right breast cancer.  S/p 6 cycles of neoadjuvant TCHP followed by bilateral mastectomy + BUNNY on 3/14/18, ypT0 ypN0 (pathologic complete response). 5/8/18 ECHO EF 61%.  \par \par Continues to do well on Herceptin. Chronic intermittent hypokalemia and hypomagnesia.\par Discussed survivorship. \par \par Plan\par Proceed with final Herceptin #17 today\par hypokalemia/hypomag - chronically low possibly due to HCTZ which she has been off for 1 week, continue mag + KCL.  Labs from today are pending\par RTO 3 months\par \par

## 2018-12-11 ENCOUNTER — OUTPATIENT (OUTPATIENT)
Dept: OUTPATIENT SERVICES | Facility: HOSPITAL | Age: 58
LOS: 1 days | Discharge: HOME | End: 2018-12-11

## 2018-12-11 DIAGNOSIS — C50.919 MALIGNANT NEOPLASM OF UNSPECIFIED SITE OF UNSPECIFIED FEMALE BREAST: ICD-10-CM

## 2018-12-11 DIAGNOSIS — Z95.828 PRESENCE OF OTHER VASCULAR IMPLANTS AND GRAFTS: Chronic | ICD-10-CM

## 2018-12-11 DIAGNOSIS — Z90.13 ACQUIRED ABSENCE OF BILATERAL BREASTS AND NIPPLES: Chronic | ICD-10-CM

## 2018-12-11 DIAGNOSIS — Z98.890 OTHER SPECIFIED POSTPROCEDURAL STATES: Chronic | ICD-10-CM

## 2018-12-11 DIAGNOSIS — Z90.710 ACQUIRED ABSENCE OF BOTH CERVIX AND UTERUS: Chronic | ICD-10-CM

## 2018-12-11 DIAGNOSIS — Z98.891 HISTORY OF UTERINE SCAR FROM PREVIOUS SURGERY: Chronic | ICD-10-CM

## 2018-12-11 NOTE — ASU PATIENT PROFILE, ADULT - TEACHING/LEARNING FACTORS INFLUENCE READINESS TO LEARN
The following patient has been assigned to Conchita Carranza RN with Outpatient Complex Care Management for high risk screening.    Reason: High Risk : Recently discharged Report    Please contact [facility] at [phone] with any questions.    Thank you,    Maria M Rodriguez, [facility]  Outpatient Care Mgmt.  [phone]     none

## 2018-12-17 ENCOUNTER — APPOINTMENT (OUTPATIENT)
Age: 58
End: 2018-12-17

## 2019-01-07 ENCOUNTER — LABORATORY RESULT (OUTPATIENT)
Age: 59
End: 2019-01-07

## 2019-01-07 ENCOUNTER — APPOINTMENT (OUTPATIENT)
Age: 59
End: 2019-01-07

## 2019-01-22 ENCOUNTER — APPOINTMENT (OUTPATIENT)
Dept: SURGERY | Facility: CLINIC | Age: 59
End: 2019-01-22
Payer: COMMERCIAL

## 2019-01-22 VITALS
HEART RATE: 80 BPM | WEIGHT: 207 LBS | DIASTOLIC BLOOD PRESSURE: 98 MMHG | SYSTOLIC BLOOD PRESSURE: 151 MMHG | BODY MASS INDEX: 37.86 KG/M2 | TEMPERATURE: 98.5 F

## 2019-01-22 PROCEDURE — 99214 OFFICE O/P EST MOD 30 MIN: CPT

## 2019-01-30 NOTE — HISTORY OF PRESENT ILLNESS
[FreeTextEntry1] : I had the pleasure of seeing Monisha Young in the office for a follow up visit.  Monisha is a diana 58 yo postmenopausal female. She is s/p bilateral mastectomy with right SLNB for [id number] positive cancer after receiving neoadjuvant chemotherapy 3/14/2018.\par \par There is no evidence of residual disease in the right mastectomy.  She had a complete pathologic response to chemotherapy.\par \par She denies SOB, dizziness or chest pain. She denies significant pain at the surgical site(s).\par \par We discussed final pathology.  \par 3/14/2018 [facility]\par Left breast, mastectomy\par -Fibrocystic changes\par Right sentinel lymph node, excision\par -Three negative lymph nodes 0/3\par Right sentinel lymph node 2, excision \par -One negative lymph node 0/1\par Right breast, mastectomy\par -No residual invasive carcinoma s/p neoadjuvant treatment\par No residual DCIS s/p neoadjuvant treatment\par Biopsy site changes\par Radial scar\par Proliferative fibrocystic changes\par Pathologic staging: ypT0, ypNo\par \par We reviewed clinical examination and she is well healed.  She has undergone nipple reconstruction by Dr. Burleson on 8/14/2018.  She has completed chemotherapy for her HER2 positive breast cancer.  Recommendation for follow up visit in 6 months.  She understands and agrees to plan.\par \par All questions answered.\par \par All questions were answered.\par

## 2019-01-30 NOTE — PAST MEDICAL HISTORY
[Postmenopausal] : The patient is postmenopausal [Menarche Age ____] : age at menarche was [unfilled] [Menopause Age____] : age at menopause was [unfilled] [Total Preg ___] : G[unfilled] [Live Births ___] : P[unfilled]  [Age At Live Birth ___] : Age at live birth: [unfilled]

## 2019-01-30 NOTE — PHYSICAL EXAM
[Normocephalic] : normocephalic [Atraumatic] : atraumatic [PERRL] : pupils equal, round and reactive to light [Sclera nonicteric] : sclera nonicteric [Supple] : supple [No Supraclavicular Adenopathy] : no supraclavicular adenopathy [Examined in the supine and seated position] : examined in the supine and seated position [No dominant masses] : no dominant masses in right breast  [No dominant masses] : no dominant masses left breast [No Nipple Retraction] : no left nipple retraction [No Nipple Discharge] : no left nipple discharge [No Axillary Lymphadenopathy] : no left axillary lymphadenopathy [No Edema] : no edema [No Rashes] : no rashes [No Ulceration] : no ulceration [de-identified] : BUNNY flap viable with healing incisions [de-identified] : BUNNY flap viable with healing incisions.

## 2019-01-30 NOTE — ASSESSMENT
[FreeTextEntry1] : 58 yo postmenopausal female s/p bilateral mastectomy and R SLNB for [id number] positive breast cancer.  She had a complete pathologic response to chemotherapy/targeted therapy. She underwent BUNNY reconstruction. She has completed her HER2 positive chemotherapy in November 2018.  Recommendation to return to the office in 6 months.\par 1. Follow up with Dr. Reddy \par 2. Follow up with Dr. Burleson\par 3. Follow up in 6 months

## 2019-01-31 ENCOUNTER — OUTPATIENT (OUTPATIENT)
Dept: OUTPATIENT SERVICES | Facility: HOSPITAL | Age: 59
LOS: 1 days | End: 2019-01-31

## 2019-01-31 ENCOUNTER — APPOINTMENT (OUTPATIENT)
Dept: ULTRASOUND IMAGING | Facility: CLINIC | Age: 59
End: 2019-01-31
Payer: COMMERCIAL

## 2019-01-31 DIAGNOSIS — Z98.890 OTHER SPECIFIED POSTPROCEDURAL STATES: Chronic | ICD-10-CM

## 2019-01-31 DIAGNOSIS — Z90.13 ACQUIRED ABSENCE OF BILATERAL BREASTS AND NIPPLES: Chronic | ICD-10-CM

## 2019-01-31 DIAGNOSIS — Z90.710 ACQUIRED ABSENCE OF BOTH CERVIX AND UTERUS: Chronic | ICD-10-CM

## 2019-01-31 DIAGNOSIS — Z95.828 PRESENCE OF OTHER VASCULAR IMPLANTS AND GRAFTS: Chronic | ICD-10-CM

## 2019-01-31 DIAGNOSIS — E04.1 NONTOXIC SINGLE THYROID NODULE: ICD-10-CM

## 2019-01-31 DIAGNOSIS — Z98.891 HISTORY OF UTERINE SCAR FROM PREVIOUS SURGERY: Chronic | ICD-10-CM

## 2019-01-31 PROCEDURE — 76536 US EXAM OF HEAD AND NECK: CPT | Mod: 26

## 2019-02-13 ENCOUNTER — OUTPATIENT (OUTPATIENT)
Dept: OUTPATIENT SERVICES | Facility: HOSPITAL | Age: 59
LOS: 1 days | Discharge: HOME | End: 2019-02-13

## 2019-02-13 DIAGNOSIS — C50.919 MALIGNANT NEOPLASM OF UNSPECIFIED SITE OF UNSPECIFIED FEMALE BREAST: ICD-10-CM

## 2019-02-13 DIAGNOSIS — Z95.828 PRESENCE OF OTHER VASCULAR IMPLANTS AND GRAFTS: Chronic | ICD-10-CM

## 2019-02-13 DIAGNOSIS — Z98.890 OTHER SPECIFIED POSTPROCEDURAL STATES: Chronic | ICD-10-CM

## 2019-02-13 DIAGNOSIS — Z90.13 ACQUIRED ABSENCE OF BILATERAL BREASTS AND NIPPLES: Chronic | ICD-10-CM

## 2019-02-13 DIAGNOSIS — Z90.710 ACQUIRED ABSENCE OF BOTH CERVIX AND UTERUS: Chronic | ICD-10-CM

## 2019-02-13 DIAGNOSIS — Z98.891 HISTORY OF UTERINE SCAR FROM PREVIOUS SURGERY: Chronic | ICD-10-CM

## 2019-02-26 ENCOUNTER — RESULT REVIEW (OUTPATIENT)
Age: 59
End: 2019-02-26

## 2019-02-26 ENCOUNTER — APPOINTMENT (OUTPATIENT)
Dept: HEMATOLOGY ONCOLOGY | Facility: CLINIC | Age: 59
End: 2019-02-26
Payer: COMMERCIAL

## 2019-02-26 ENCOUNTER — APPOINTMENT (OUTPATIENT)
Age: 59
End: 2019-02-26

## 2019-02-26 ENCOUNTER — LABORATORY RESULT (OUTPATIENT)
Age: 59
End: 2019-02-26

## 2019-02-26 VITALS
HEART RATE: 83 BPM | BODY MASS INDEX: 37.91 KG/M2 | HEIGHT: 62 IN | DIASTOLIC BLOOD PRESSURE: 85 MMHG | TEMPERATURE: 98 F | WEIGHT: 206 LBS | OXYGEN SATURATION: 98 % | SYSTOLIC BLOOD PRESSURE: 121 MMHG

## 2019-02-26 LAB
BASOPHILS # BLD AUTO: 0.1 K/UL — SIGNIFICANT CHANGE UP (ref 0–0.2)
BASOPHILS NFR BLD AUTO: 1.1 % — SIGNIFICANT CHANGE UP (ref 0–2)
EOSINOPHIL # BLD AUTO: 0.1 K/UL — SIGNIFICANT CHANGE UP (ref 0–0.5)
EOSINOPHIL NFR BLD AUTO: 2 % — SIGNIFICANT CHANGE UP (ref 0–6)
HCT VFR BLD CALC: 39.2 % — SIGNIFICANT CHANGE UP (ref 34.5–45)
HGB BLD-MCNC: 13 G/DL — SIGNIFICANT CHANGE UP (ref 11.5–15.5)
LYMPHOCYTES # BLD AUTO: 1.6 K/UL — SIGNIFICANT CHANGE UP (ref 1–3.3)
LYMPHOCYTES # BLD AUTO: 24.6 % — SIGNIFICANT CHANGE UP (ref 13–44)
MCHC RBC-ENTMCNC: 26.9 PG — LOW (ref 27–34)
MCHC RBC-ENTMCNC: 33.2 G/DL — SIGNIFICANT CHANGE UP (ref 32–36)
MCV RBC AUTO: 81.2 FL — SIGNIFICANT CHANGE UP (ref 80–100)
MONOCYTES # BLD AUTO: 0.5 K/UL — SIGNIFICANT CHANGE UP (ref 0–0.9)
MONOCYTES NFR BLD AUTO: 8.4 % — SIGNIFICANT CHANGE UP (ref 2–14)
NEUTROPHILS # BLD AUTO: 4.1 K/UL — SIGNIFICANT CHANGE UP (ref 1.8–7.4)
NEUTROPHILS NFR BLD AUTO: 63.9 % — SIGNIFICANT CHANGE UP (ref 43–77)
PLATELET # BLD AUTO: 262 K/UL — SIGNIFICANT CHANGE UP (ref 150–400)
RBC # BLD: 4.82 M/UL — SIGNIFICANT CHANGE UP (ref 3.8–5.2)
RBC # FLD: 12.1 % — SIGNIFICANT CHANGE UP (ref 10.3–14.5)
WBC # BLD: 6.4 K/UL — SIGNIFICANT CHANGE UP (ref 3.8–10.5)
WBC # FLD AUTO: 6.4 K/UL — SIGNIFICANT CHANGE UP (ref 3.8–10.5)

## 2019-02-26 PROCEDURE — 99214 OFFICE O/P EST MOD 30 MIN: CPT

## 2019-02-26 RX ORDER — OLMESARTAN MEDOXOMIL 40 MG/1
40 TABLET, FILM COATED ORAL
Refills: 0 | Status: DISCONTINUED | COMMUNITY
Start: 2018-11-27 | End: 2019-02-26

## 2019-02-26 NOTE — HISTORY OF PRESENT ILLNESS
[Disease: _____________________] : Disease: [unfilled] [T: ___] : T[unfilled] [de-identified] : Ms. Young was diagnosed with HER2 positive right breast cancer at age 56 in August 2017.\par \par She initially presented with right nipple yellowish discharge.  Subsequent mammogram and ultrasound on 7/27/17 showed new spiculated mass in inner central posterior right breast with sonographic confirmation of suspicious mass at 3-4:00 region, and an indeterminate hypoechoic nodule at 1:00 right breast.\par \par On 8/3/17 she had right breast core biopsy of 3-4:00 mass --> pathology showed invasive poorly differentiated ductal carcinoma, Enzo grade 8/9, 0.9 cm--> ER-, [place]-, HER2 IHC 3+ (positive)\par Right breast 1:00 --> fibrocystic change, proliferative type with nodular sclerosing adenosis\par \par 8/21/17 MRI breast -   Biopsy  clip  and  3.8  cm  area  of  irregular  non  mass  enhancement  in  the right  breast  at  the  3-4:00  location  posteriorly,  corresponding  to  recently\par diagnosed  invasive  carcinoma. Additional  irregular  areas  of  enhancement  in  the  right  breast  at  the 2-3:00  location  and  8-9:00  location,  suspicious  for  additional disease. Representative  MR  guided  core  biopsy  of  enhancement  at  the  8-9:00  location can  be  performed  to  evaluate  for  multicentric  disease,  if  it  would  change\par management. 8  mm  enhancing  nodule  demonstrating  washout  kinetics  in  the  upper slightly  inner  mid  left  breast.  MR  guided  core  biopsy  is  advised.\par Clip  artifact  in  the  right  breast  at  the  1:00  location  marking  site  of biopsy-proven  radial  scar.  No  associated  suspicious  enhancement.  Recommend\par appropriate  surgical  management.  2.1  cm  oval  T2  bright  lesion  in  the  liver,  probable  cyst  or  hemangioma.\par Recommend  dedicated  liver  ultrasound  for  further  evaluation.\par \par 8/28/17 MRI Core biopsy right breast lower outer quadrant --> DCIS, solid pattern, high grade atypia, focal necrosis. ER/VA pending\par Core biopsy left breast central inner --> mild proliferative fibrocystic change\par \par Treatment Summary\par 9/21/17 port placement\par 9/20/17 - 1/3/18  TCHP x 6 cycles (9/30/17 - 1/03/18\par 5/03/18 Started on maintenance Trastuzumab (Herceptin) X every 3 weeks, 52 weeks.\par She had breast reconstruction on 8/14/18 with nipple reconstruction.\par \par 3/14/18 - She underwent right skin sparing mastectomy with SNL biopsy and left risk reduction mastectomy with BUNNY reconstruction.\par 1- Left breast, mastectomy- Fibrocystic changes\par 2- Right sentinel lymph node, excision- Three negative lymph nodes (0/3)\par 3- Right sentinel lymph node 2, excision- One negative lymph node (0/1)\par 4- Right breast, mastectomy- No residual invasive carcinoma (s/p neoadjuvant treatment)- No residual DCIS (s/p neoadjuvant treatment)\par  Pathologic staging: ypT0, ypN0\par \par  [de-identified] : HER2 positive [de-identified] : Patient returns for follow up. \par Completed Herceptin on 11/26/18. \par Doing well very well. No complaints. \par No fevers, chills, or night sweats. No N/V/D/C. No SOB or DESAI.\par Denies any pain or leg swelling. No recent weight changes. \par She had gained 20 pounds since treatment has ended.   She is watching her caloric intake and recently bought [employer]. \par She's s/p thyroid ultrasound with bilateral thyroid cystic nodules, all <1 cm.

## 2019-02-26 NOTE — PHYSICAL EXAM
[Restricted in physically strenuous activity but ambulatory and able to carry out work of a light or sedentary nature] : Status 1- Restricted in physically strenuous activity but ambulatory and able to carry out work of a light or sedentary nature, e.g., light house work, office work [Normal] : affect appropriate [de-identified] : supple [de-identified] : No edema [de-identified] : bilateral reconstructed breasts w/o nodularity [de-identified] : BS+, soft,

## 2019-02-26 NOTE — ASSESSMENT
[FreeTextEntry1] : 58 year old postmenopausal female with stage IIA (T2) HER2 positive right breast cancer.  S/p 6 cycles of neoadjuvant TCHP followed by bilateral mastectomy + BUNNY on 3/14/18, ypT0 ypN0 (pathologic complete response). 5/8/18 ECHO EF 61%.  Completed 1 year of Herceptin on 11/26/18. \par \par Doing very well clinically. No new symptoms or pain. \par \par Plan: \par Continue port flushes every 6 weeks\par RTO 3 months\par Follow up PCP re: thyroid nodules\par \par

## 2019-03-01 ENCOUNTER — TRANSCRIPTION ENCOUNTER (OUTPATIENT)
Age: 59
End: 2019-03-01

## 2019-03-29 ENCOUNTER — OUTPATIENT (OUTPATIENT)
Dept: OUTPATIENT SERVICES | Facility: HOSPITAL | Age: 59
LOS: 1 days | Discharge: HOME | End: 2019-03-29

## 2019-03-29 DIAGNOSIS — Z90.710 ACQUIRED ABSENCE OF BOTH CERVIX AND UTERUS: Chronic | ICD-10-CM

## 2019-03-29 DIAGNOSIS — Z95.828 PRESENCE OF OTHER VASCULAR IMPLANTS AND GRAFTS: Chronic | ICD-10-CM

## 2019-03-29 DIAGNOSIS — Z98.891 HISTORY OF UTERINE SCAR FROM PREVIOUS SURGERY: Chronic | ICD-10-CM

## 2019-03-29 DIAGNOSIS — C50.919 MALIGNANT NEOPLASM OF UNSPECIFIED SITE OF UNSPECIFIED FEMALE BREAST: ICD-10-CM

## 2019-03-29 DIAGNOSIS — Z98.890 OTHER SPECIFIED POSTPROCEDURAL STATES: Chronic | ICD-10-CM

## 2019-03-29 DIAGNOSIS — Z90.13 ACQUIRED ABSENCE OF BILATERAL BREASTS AND NIPPLES: Chronic | ICD-10-CM

## 2019-04-09 ENCOUNTER — APPOINTMENT (OUTPATIENT)
Age: 59
End: 2019-04-09

## 2019-04-12 NOTE — BRIEF OPERATIVE NOTE - ASSISTANT(S)
Subjective:  HPI                    Objective:  System    Physical Exam    General     ROS    Assessment/Plan:    SUBJECTIVE  Subjective changes as noted by pt:   Continues pool therapy 1x/wk on Mondays. Each hip feels pretty good today. Pain better when pt does exercises daily.     Current pain level: 5/10 Current Pain level: 5/10(no pain while riding the bike or during ex in clinic)   Changes in function:  Yes (See Goal flowsheet attached for changes in current functional level)     Adverse reaction to treatment or activity:  None    OBJECTIVE  Changes in objective findings:  Yes,   Objective: ABle to independently lift L leg for supine SLR. Good effort throughout ex session, fatigued afterwards. SOB with 6 min on bike and 1 min during step up-down steady pace.       ASSESSMENT  Ronel continues to require intervention to meet STG and LTG's: PT  Patient is progressing as expected.  Response to therapy has shown an improvement in  pain level and muscle control  Progress made towards STG/LTG?  Yes (See Goal flowsheet attached for updates on achievement of STG and LTG)    PLAN  Current treatment program is being advanced to more complex exercises.    PTA/ATC plan:  Will continue with present plan of care.    Please refer to the daily flowsheet for treatment today, total treatment time and time spent performing 1:1 timed codes.        
RT Beltran

## 2019-05-22 ENCOUNTER — OUTPATIENT (OUTPATIENT)
Dept: OUTPATIENT SERVICES | Facility: HOSPITAL | Age: 59
LOS: 1 days | Discharge: HOME | End: 2019-05-22

## 2019-05-22 DIAGNOSIS — Z95.828 PRESENCE OF OTHER VASCULAR IMPLANTS AND GRAFTS: Chronic | ICD-10-CM

## 2019-05-22 DIAGNOSIS — Z98.890 OTHER SPECIFIED POSTPROCEDURAL STATES: Chronic | ICD-10-CM

## 2019-05-22 DIAGNOSIS — Z90.710 ACQUIRED ABSENCE OF BOTH CERVIX AND UTERUS: Chronic | ICD-10-CM

## 2019-05-22 DIAGNOSIS — C50.919 MALIGNANT NEOPLASM OF UNSPECIFIED SITE OF UNSPECIFIED FEMALE BREAST: ICD-10-CM

## 2019-05-22 DIAGNOSIS — Z90.13 ACQUIRED ABSENCE OF BILATERAL BREASTS AND NIPPLES: Chronic | ICD-10-CM

## 2019-05-22 DIAGNOSIS — Z98.891 HISTORY OF UTERINE SCAR FROM PREVIOUS SURGERY: Chronic | ICD-10-CM

## 2019-05-28 ENCOUNTER — APPOINTMENT (OUTPATIENT)
Dept: HEMATOLOGY ONCOLOGY | Facility: CLINIC | Age: 59
End: 2019-05-28
Payer: COMMERCIAL

## 2019-05-28 ENCOUNTER — LABORATORY RESULT (OUTPATIENT)
Age: 59
End: 2019-05-28

## 2019-05-28 ENCOUNTER — RESULT REVIEW (OUTPATIENT)
Age: 59
End: 2019-05-28

## 2019-05-28 ENCOUNTER — APPOINTMENT (OUTPATIENT)
Age: 59
End: 2019-05-28

## 2019-05-28 VITALS
BODY MASS INDEX: 35.94 KG/M2 | HEART RATE: 72 BPM | DIASTOLIC BLOOD PRESSURE: 86 MMHG | TEMPERATURE: 98.6 F | WEIGHT: 195.31 LBS | SYSTOLIC BLOOD PRESSURE: 150 MMHG | HEIGHT: 62 IN | OXYGEN SATURATION: 98 %

## 2019-05-28 LAB
BASOPHILS # BLD AUTO: 0.1 K/UL — SIGNIFICANT CHANGE UP (ref 0–0.2)
BASOPHILS NFR BLD AUTO: 1.1 % — SIGNIFICANT CHANGE UP (ref 0–2)
EOSINOPHIL # BLD AUTO: 0.1 K/UL — SIGNIFICANT CHANGE UP (ref 0–0.5)
EOSINOPHIL NFR BLD AUTO: 1.8 % — SIGNIFICANT CHANGE UP (ref 0–6)
HCT VFR BLD CALC: 38 % — SIGNIFICANT CHANGE UP (ref 34.5–45)
HGB BLD-MCNC: 12.7 G/DL — SIGNIFICANT CHANGE UP (ref 11.5–15.5)
LYMPHOCYTES # BLD AUTO: 1.5 K/UL — SIGNIFICANT CHANGE UP (ref 1–3.3)
LYMPHOCYTES # BLD AUTO: 25.8 % — SIGNIFICANT CHANGE UP (ref 13–44)
MCHC RBC-ENTMCNC: 27.6 PG — SIGNIFICANT CHANGE UP (ref 27–34)
MCHC RBC-ENTMCNC: 33.4 G/DL — SIGNIFICANT CHANGE UP (ref 32–36)
MCV RBC AUTO: 82.7 FL — SIGNIFICANT CHANGE UP (ref 80–100)
MONOCYTES # BLD AUTO: 0.4 K/UL — SIGNIFICANT CHANGE UP (ref 0–0.9)
MONOCYTES NFR BLD AUTO: 6.9 % — SIGNIFICANT CHANGE UP (ref 2–14)
NEUTROPHILS # BLD AUTO: 3.7 K/UL — SIGNIFICANT CHANGE UP (ref 1.8–7.4)
NEUTROPHILS NFR BLD AUTO: 64.3 % — SIGNIFICANT CHANGE UP (ref 43–77)
PLATELET # BLD AUTO: 245 K/UL — SIGNIFICANT CHANGE UP (ref 150–400)
RBC # BLD: 4.6 M/UL — SIGNIFICANT CHANGE UP (ref 3.8–5.2)
RBC # FLD: 11.3 % — SIGNIFICANT CHANGE UP (ref 10.3–14.5)
WBC # BLD: 5.8 K/UL — SIGNIFICANT CHANGE UP (ref 3.8–10.5)
WBC # FLD AUTO: 5.8 K/UL — SIGNIFICANT CHANGE UP (ref 3.8–10.5)

## 2019-05-28 PROCEDURE — 99214 OFFICE O/P EST MOD 30 MIN: CPT

## 2019-05-28 RX ORDER — POTASSIUM CHLORIDE 1500 MG/1
20 TABLET, EXTENDED RELEASE ORAL
Qty: 60 | Refills: 0 | Status: DISCONTINUED | COMMUNITY
Start: 2018-09-12 | End: 2019-05-28

## 2019-05-28 RX ORDER — OLMESARTAN MEDOXOMIL AND HYDROCHLOROTHIAZIDE 20; 12.5 MG/1; MG/1
20-12.5 TABLET ORAL
Qty: 60 | Refills: 0 | Status: DISCONTINUED | COMMUNITY
Start: 2019-01-23 | End: 2019-05-28

## 2019-05-28 NOTE — ADDENDUM
[FreeTextEntry1] : I, Julio Issa, acted solely as a scribe for Dr. Jaycee Reddy on 05/28/2019.\par \par All medical record entries made by the scribe were at my, Dr. Jaycee Reddy's direction and personally dictated by me on 05/28/2019. I have reviewed the chart and agree that the record accurately reflects my personal performance of the history, physical exam, assessment and plan. I have also personally directed, reviewed, and agreed with the chart.

## 2019-05-28 NOTE — ASSESSMENT
[FreeTextEntry1] : 58 year old postmenopausal female with stage IIA (T2) HER2 positive right breast cancer.  S/p 6 cycles of neoadjuvant TCHP followed by bilateral mastectomy + BUNNY on 3/14/18, ypT0 ypN0 (pathologic complete response). 5/8/18 ECHO EF 61%.  Completed 1 year of Herceptin on 11/26/18. \par \par Doing very well clinically. No new symptoms or pain. \par \par Plan: \par Set up port removal. Continue port flushes in the interim. \par RTO 6 months\par

## 2019-05-28 NOTE — PHYSICAL EXAM
[Restricted in physically strenuous activity but ambulatory and able to carry out work of a light or sedentary nature] : Status 1- Restricted in physically strenuous activity but ambulatory and able to carry out work of a light or sedentary nature, e.g., light house work, office work [Normal] : grossly intact [de-identified] : BS+, soft,  [de-identified] : bilateral reconstructed breasts w/o nodularity [de-identified] : supple [de-identified] : No edema

## 2019-05-29 ENCOUNTER — RESULT REVIEW (OUTPATIENT)
Age: 59
End: 2019-05-29

## 2019-05-31 ENCOUNTER — APPOINTMENT (OUTPATIENT)
Dept: HEMATOLOGY ONCOLOGY | Facility: CLINIC | Age: 59
End: 2019-05-31

## 2019-05-31 ENCOUNTER — RESULT REVIEW (OUTPATIENT)
Age: 59
End: 2019-05-31

## 2019-05-31 LAB
BASOPHILS # BLD AUTO: 0.1 K/UL — SIGNIFICANT CHANGE UP (ref 0–0.2)
BASOPHILS NFR BLD AUTO: 1.2 % — SIGNIFICANT CHANGE UP (ref 0–2)
BUN SERPL-MCNC: 22 MG/DL — SIGNIFICANT CHANGE UP (ref 7–23)
CA-I BLDA-SCNC: 1.2 MMOL/L — SIGNIFICANT CHANGE UP (ref 1.12–1.3)
CHLORIDE SERPL-SCNC: 103 MMOL/L — SIGNIFICANT CHANGE UP (ref 96–108)
CO2 SERPL-SCNC: 25 MMOL/L — SIGNIFICANT CHANGE UP (ref 22–31)
CREAT SERPL-MCNC: 0.7 MG/DL — SIGNIFICANT CHANGE UP (ref 0.5–1.3)
EOSINOPHIL # BLD AUTO: 0.1 K/UL — SIGNIFICANT CHANGE UP (ref 0–0.5)
EOSINOPHIL NFR BLD AUTO: 1.8 % — SIGNIFICANT CHANGE UP (ref 0–6)
GLUCOSE SERPL-MCNC: 88 MG/DL — SIGNIFICANT CHANGE UP (ref 70–99)
HCT VFR BLD CALC: 37.9 % — SIGNIFICANT CHANGE UP (ref 34.5–45)
HGB BLD-MCNC: 12.8 G/DL — SIGNIFICANT CHANGE UP (ref 11.5–15.5)
LYMPHOCYTES # BLD AUTO: 1.4 K/UL — SIGNIFICANT CHANGE UP (ref 1–3.3)
LYMPHOCYTES # BLD AUTO: 23 % — SIGNIFICANT CHANGE UP (ref 13–44)
MCHC RBC-ENTMCNC: 27.9 PG — SIGNIFICANT CHANGE UP (ref 27–34)
MCHC RBC-ENTMCNC: 33.8 G/DL — SIGNIFICANT CHANGE UP (ref 32–36)
MCV RBC AUTO: 82.7 FL — SIGNIFICANT CHANGE UP (ref 80–100)
MONOCYTES # BLD AUTO: 0.4 K/UL — SIGNIFICANT CHANGE UP (ref 0–0.9)
MONOCYTES NFR BLD AUTO: 6.3 % — SIGNIFICANT CHANGE UP (ref 2–14)
NEUTROPHILS # BLD AUTO: 4.1 K/UL — SIGNIFICANT CHANGE UP (ref 1.8–7.4)
NEUTROPHILS NFR BLD AUTO: 67.7 % — SIGNIFICANT CHANGE UP (ref 43–77)
PLATELET # BLD AUTO: 242 K/UL — SIGNIFICANT CHANGE UP (ref 150–400)
POTASSIUM SERPL-MCNC: 4 MMOL/L — SIGNIFICANT CHANGE UP (ref 3.5–5.3)
POTASSIUM SERPL-SCNC: 4 MMOL/L — SIGNIFICANT CHANGE UP (ref 3.5–5.3)
RBC # BLD: 4.58 M/UL — SIGNIFICANT CHANGE UP (ref 3.8–5.2)
RBC # FLD: 11.6 % — SIGNIFICANT CHANGE UP (ref 10.3–14.5)
SODIUM SERPL-SCNC: 141 MMOL/L — SIGNIFICANT CHANGE UP (ref 135–145)
WBC # BLD: 6 K/UL — SIGNIFICANT CHANGE UP (ref 3.8–10.5)
WBC # FLD AUTO: 6 K/UL — SIGNIFICANT CHANGE UP (ref 3.8–10.5)

## 2019-06-18 ENCOUNTER — FORM ENCOUNTER (OUTPATIENT)
Age: 59
End: 2019-06-18

## 2019-06-19 ENCOUNTER — OUTPATIENT (OUTPATIENT)
Dept: OUTPATIENT SERVICES | Facility: HOSPITAL | Age: 59
LOS: 1 days | End: 2019-06-19

## 2019-06-19 ENCOUNTER — APPOINTMENT (OUTPATIENT)
Dept: INTERVENTIONAL RADIOLOGY/VASCULAR | Facility: CLINIC | Age: 59
End: 2019-06-19
Payer: COMMERCIAL

## 2019-06-19 DIAGNOSIS — Z98.891 HISTORY OF UTERINE SCAR FROM PREVIOUS SURGERY: Chronic | ICD-10-CM

## 2019-06-19 DIAGNOSIS — Z98.890 OTHER SPECIFIED POSTPROCEDURAL STATES: Chronic | ICD-10-CM

## 2019-06-19 DIAGNOSIS — Z90.710 ACQUIRED ABSENCE OF BOTH CERVIX AND UTERUS: Chronic | ICD-10-CM

## 2019-06-19 DIAGNOSIS — C50.911 MALIGNANT NEOPLASM OF UNSPECIFIED SITE OF RIGHT FEMALE BREAST: ICD-10-CM

## 2019-06-19 DIAGNOSIS — Z90.13 ACQUIRED ABSENCE OF BILATERAL BREASTS AND NIPPLES: Chronic | ICD-10-CM

## 2019-06-19 DIAGNOSIS — Z95.828 PRESENCE OF OTHER VASCULAR IMPLANTS AND GRAFTS: Chronic | ICD-10-CM

## 2019-06-19 PROCEDURE — 36590 REMOVAL TUNNELED CV CATH: CPT

## 2019-06-19 PROCEDURE — 77001 FLUOROGUIDE FOR VEIN DEVICE: CPT | Mod: 26

## 2019-07-16 ENCOUNTER — APPOINTMENT (OUTPATIENT)
Age: 59
End: 2019-07-16

## 2019-08-19 ENCOUNTER — OUTPATIENT (OUTPATIENT)
Dept: OUTPATIENT SERVICES | Facility: HOSPITAL | Age: 59
LOS: 1 days | End: 2019-08-19

## 2019-08-19 ENCOUNTER — APPOINTMENT (OUTPATIENT)
Dept: ULTRASOUND IMAGING | Facility: CLINIC | Age: 59
End: 2019-08-19
Payer: COMMERCIAL

## 2019-08-19 DIAGNOSIS — Z95.828 PRESENCE OF OTHER VASCULAR IMPLANTS AND GRAFTS: Chronic | ICD-10-CM

## 2019-08-19 DIAGNOSIS — Z90.13 ACQUIRED ABSENCE OF BILATERAL BREASTS AND NIPPLES: Chronic | ICD-10-CM

## 2019-08-19 DIAGNOSIS — E04.1 NONTOXIC SINGLE THYROID NODULE: ICD-10-CM

## 2019-08-19 DIAGNOSIS — Z98.890 OTHER SPECIFIED POSTPROCEDURAL STATES: Chronic | ICD-10-CM

## 2019-08-19 DIAGNOSIS — Z90.710 ACQUIRED ABSENCE OF BOTH CERVIX AND UTERUS: Chronic | ICD-10-CM

## 2019-08-19 DIAGNOSIS — Z98.891 HISTORY OF UTERINE SCAR FROM PREVIOUS SURGERY: Chronic | ICD-10-CM

## 2019-08-19 PROCEDURE — 76536 US EXAM OF HEAD AND NECK: CPT | Mod: 26

## 2019-08-22 ENCOUNTER — APPOINTMENT (OUTPATIENT)
Dept: ENDOCRINOLOGY | Facility: CLINIC | Age: 59
End: 2019-08-22
Payer: COMMERCIAL

## 2019-08-22 VITALS
DIASTOLIC BLOOD PRESSURE: 80 MMHG | SYSTOLIC BLOOD PRESSURE: 120 MMHG | BODY MASS INDEX: 35.51 KG/M2 | HEIGHT: 62 IN | HEART RATE: 77 BPM | WEIGHT: 193 LBS

## 2019-08-22 DIAGNOSIS — Z86.79 PERSONAL HISTORY OF OTHER DISEASES OF THE CIRCULATORY SYSTEM: ICD-10-CM

## 2019-08-22 DIAGNOSIS — Z85.9 PERSONAL HISTORY OF MALIGNANT NEOPLASM, UNSPECIFIED: ICD-10-CM

## 2019-08-22 PROCEDURE — 99204 OFFICE O/P NEW MOD 45 MIN: CPT

## 2019-08-22 RX ORDER — MAGNESIUM OXIDE 500 MG
500 TABLET ORAL
Qty: 120 | Refills: 1 | Status: DISCONTINUED | COMMUNITY
Start: 2018-10-31 | End: 2019-08-22

## 2019-08-22 RX ORDER — POTASSIUM CHLORIDE 1500 MG/1
20 TABLET, EXTENDED RELEASE ORAL
Qty: 11 | Refills: 0 | Status: DISCONTINUED | COMMUNITY
Start: 2019-05-29 | End: 2019-08-22

## 2019-08-22 RX ORDER — MAGNESIUM OXIDE 500 MG
500 TABLET ORAL TWICE DAILY
Qty: 120 | Refills: 1 | Status: DISCONTINUED | COMMUNITY
Start: 2018-06-14 | End: 2019-08-22

## 2019-08-22 NOTE — PHYSICAL EXAM
[Alert] : alert [Well Nourished] : well nourished [No Acute Distress] : no acute distress [Well Developed] : well developed [Normal Sclera/Conjunctiva] : normal sclera/conjunctiva [EOMI] : extra ocular movement intact [No Proptosis] : no proptosis [Normal Oropharynx] : the oropharynx was normal [Thyroid Not Enlarged] : the thyroid was not enlarged [No Thyroid Nodules] : there were no palpable thyroid nodules [No Respiratory Distress] : no respiratory distress [Clear to Auscultation] : lungs were clear to auscultation bilaterally [No Accessory Muscle Use] : no accessory muscle use [Normal Rate] : heart rate was normal  [Normal S1, S2] : normal S1 and S2 [Pedal Pulses Normal] : the pedal pulses are present [Regular Rhythm] : with a regular rhythm [No Edema] : there was no peripheral edema [Not Tender] : non-tender [Normal Bowel Sounds] : normal bowel sounds [Soft] : abdomen soft [Not Distended] : not distended [Post Cervical Nodes] : posterior cervical nodes [Anterior Cervical Nodes] : anterior cervical nodes [Normal] : normal and non tender [No Stigmata of Cushings Syndrome] : no stigmata of cushings syndrome [Normal Strength/Tone] : muscle strength and tone were normal [Normal Gait] : normal gait [No Rash] : no rash [Normal Reflexes] : deep tendon reflexes were 2+ and symmetric [No Tremors] : no tremors [Oriented x3] : oriented to person, place, and time [Acanthosis Nigricans] : no acanthosis nigricans

## 2019-08-22 NOTE — HISTORY OF PRESENT ILLNESS
[FreeTextEntry1] : NTMNG \par \par s/p breast cancer mastectomy 2018 and s/p chemoT . No estrogen blockers now.

## 2019-08-22 NOTE — ASSESSMENT
[FreeTextEntry1] : NTMNG discovered during carotid US \par no family h/o thyroid cancer, no neck XRT\par check Tfts today and will call pt with results. \par images of  tyroid Us reviewed : she has multiple subcentimeter colloid cysts . They don't require intervention. \par in 10 mos. \par \par obesity:\par discussed diet and exercise\par encouraged more exercise walking 30 min 3 x week\par Discussed complications of diabetes at length including MI/CVA/ESRD/dialysis/blindness/amputations/infections\par Needs to try to have more protein for meals\par \par vitamin d deficiency: start vitamin d 1000 u qd \par \par hLD; check lipids. will call pt with results \par \par

## 2019-08-26 LAB
CHOLEST SERPL-MCNC: 188 MG/DL
CHOLEST/HDLC SERPL: 3.2 RATIO
HDLC SERPL-MCNC: 59 MG/DL
LDLC SERPL CALC-MCNC: 109 MG/DL
LDLC SERPL DIRECT ASSAY-MCNC: 127 MG/DL
T3 SERPL-MCNC: 117 NG/DL
T4 FREE SERPL-MCNC: 1.1 NG/DL
TRIGL SERPL-MCNC: 98 MG/DL
TSH SERPL-ACNC: 2.01 UIU/ML

## 2019-09-24 NOTE — H&P PST ADULT - TEMPERATURE IN CELSIUS (DEGREES C)
From: María Alegre  To: Jennifer Francisco MD  Sent: 9/24/2019 9:21 AM CDT  Subject: Non-Urgent Medical Question    Are you having flu shot clinic days? I can’t find a schedule anywhere.   36.6

## 2019-10-11 NOTE — H&P PST ADULT - NEGATIVE ENMT SYMPTOMS
no tinnitus/no ear pain/no nose bleeds/no throat pain/no dysphagia/no hearing difficulty/no vertigo/no sinus symptoms
No

## 2019-12-02 ENCOUNTER — APPOINTMENT (OUTPATIENT)
Dept: RADIOLOGY | Facility: CLINIC | Age: 59
End: 2019-12-02
Payer: COMMERCIAL

## 2019-12-02 PROCEDURE — 77080 DXA BONE DENSITY AXIAL: CPT

## 2019-12-04 ENCOUNTER — OUTPATIENT (OUTPATIENT)
Dept: OUTPATIENT SERVICES | Facility: HOSPITAL | Age: 59
LOS: 1 days | Discharge: HOME | End: 2019-12-04

## 2019-12-04 DIAGNOSIS — C50.919 MALIGNANT NEOPLASM OF UNSPECIFIED SITE OF UNSPECIFIED FEMALE BREAST: ICD-10-CM

## 2019-12-04 DIAGNOSIS — Z90.710 ACQUIRED ABSENCE OF BOTH CERVIX AND UTERUS: Chronic | ICD-10-CM

## 2019-12-04 DIAGNOSIS — Z98.891 HISTORY OF UTERINE SCAR FROM PREVIOUS SURGERY: Chronic | ICD-10-CM

## 2019-12-04 DIAGNOSIS — Z95.828 PRESENCE OF OTHER VASCULAR IMPLANTS AND GRAFTS: Chronic | ICD-10-CM

## 2019-12-04 DIAGNOSIS — Z98.890 OTHER SPECIFIED POSTPROCEDURAL STATES: Chronic | ICD-10-CM

## 2019-12-04 DIAGNOSIS — Z90.13 ACQUIRED ABSENCE OF BILATERAL BREASTS AND NIPPLES: Chronic | ICD-10-CM

## 2019-12-09 ENCOUNTER — APPOINTMENT (OUTPATIENT)
Dept: HEMATOLOGY ONCOLOGY | Facility: CLINIC | Age: 59
End: 2019-12-09
Payer: COMMERCIAL

## 2019-12-09 ENCOUNTER — RESULT REVIEW (OUTPATIENT)
Age: 59
End: 2019-12-09

## 2019-12-09 VITALS
WEIGHT: 197.56 LBS | DIASTOLIC BLOOD PRESSURE: 95 MMHG | HEART RATE: 82 BPM | HEIGHT: 62 IN | OXYGEN SATURATION: 99 % | TEMPERATURE: 98.9 F | SYSTOLIC BLOOD PRESSURE: 158 MMHG | BODY MASS INDEX: 36.35 KG/M2

## 2019-12-09 LAB
BASOPHILS # BLD AUTO: 0.1 K/UL — SIGNIFICANT CHANGE UP (ref 0–0.2)
BASOPHILS NFR BLD AUTO: 1.2 % — SIGNIFICANT CHANGE UP (ref 0–2)
EOSINOPHIL # BLD AUTO: 0.3 K/UL — SIGNIFICANT CHANGE UP (ref 0–0.5)
EOSINOPHIL NFR BLD AUTO: 5.2 % — SIGNIFICANT CHANGE UP (ref 0–6)
HCT VFR BLD CALC: 40.8 % — SIGNIFICANT CHANGE UP (ref 34.5–45)
HGB BLD-MCNC: 13.2 G/DL — SIGNIFICANT CHANGE UP (ref 11.5–15.5)
LYMPHOCYTES # BLD AUTO: 1.5 K/UL — SIGNIFICANT CHANGE UP (ref 1–3.3)
LYMPHOCYTES # BLD AUTO: 25.5 % — SIGNIFICANT CHANGE UP (ref 13–44)
MCHC RBC-ENTMCNC: 27.3 PG — SIGNIFICANT CHANGE UP (ref 27–34)
MCHC RBC-ENTMCNC: 32.3 G/DL — SIGNIFICANT CHANGE UP (ref 32–36)
MCV RBC AUTO: 84.5 FL — SIGNIFICANT CHANGE UP (ref 80–100)
MONOCYTES # BLD AUTO: 0.4 K/UL — SIGNIFICANT CHANGE UP (ref 0–0.9)
MONOCYTES NFR BLD AUTO: 6.8 % — SIGNIFICANT CHANGE UP (ref 2–14)
NEUTROPHILS # BLD AUTO: 3.7 K/UL — SIGNIFICANT CHANGE UP (ref 1.8–7.4)
NEUTROPHILS NFR BLD AUTO: 61.4 % — SIGNIFICANT CHANGE UP (ref 43–77)
PLATELET # BLD AUTO: 215 K/UL — SIGNIFICANT CHANGE UP (ref 150–400)
RBC # BLD: 4.84 M/UL — SIGNIFICANT CHANGE UP (ref 3.8–5.2)
RBC # FLD: 11.6 % — SIGNIFICANT CHANGE UP (ref 10.3–14.5)
WBC # BLD: 6 K/UL — SIGNIFICANT CHANGE UP (ref 3.8–10.5)
WBC # FLD AUTO: 6 K/UL — SIGNIFICANT CHANGE UP (ref 3.8–10.5)

## 2019-12-09 PROCEDURE — 99214 OFFICE O/P EST MOD 30 MIN: CPT

## 2019-12-09 NOTE — HISTORY OF PRESENT ILLNESS
[Disease: _____________________] : Disease: [unfilled] [T: ___] : T[unfilled] [de-identified] : Ms. Young was diagnosed with HER2 positive right breast cancer at age 56 in August 2017.\par \par She initially presented with right nipple yellowish discharge.  Subsequent mammogram and ultrasound on 7/27/17 showed new spiculated mass in inner central posterior right breast with sonographic confirmation of suspicious mass at 3-4:00 region, and an indeterminate hypoechoic nodule at 1:00 right breast.\par \par On 8/3/17 she had right breast core biopsy of 3-4:00 mass --> pathology showed invasive poorly differentiated ductal carcinoma, Enzo grade 8/9, 0.9 cm--> ER-, SC-, HER2 IHC 3+ (positive)\par Right breast 1:00 --> fibrocystic change, proliferative type with nodular sclerosing adenosis\par \par 8/21/17 MRI breast -   Biopsy  clip  and  3.8  cm  area  of  irregular  non  mass  enhancement  in  the right  breast  at  the  3-4:00  location  posteriorly,  corresponding  to  recently\par diagnosed  invasive  carcinoma. Additional  irregular  areas  of  enhancement  in  the  right  breast  at  the 2-3:00  location  and  8-9:00  location,  suspicious  for  additional disease. Representative  MR  guided  core  biopsy  of  enhancement  at  the  8-9:00  location can  be  performed  to  evaluate  for  multicentric  disease,  if  it  would  change\par management. 8  mm  enhancing  nodule  demonstrating  washout  kinetics  in  the  upper slightly  inner  mid  left  breast.  MR  guided  core  biopsy  is  advised.\par Clip  artifact  in  the  right  breast  at  the  1:00  location  marking  site  of biopsy-proven  radial  scar.  No  associated  suspicious  enhancement.  Recommend\par appropriate  surgical  management.  2.1  cm  oval  T2  bright  lesion  in  the  liver,  probable  cyst  or  hemangioma.\par Recommend  dedicated  liver  ultrasound  for  further  evaluation.\par \par 8/28/17 MRI Core biopsy right breast lower outer quadrant --> DCIS, solid pattern, high grade atypia, focal necrosis. ER/SC pending\par Core biopsy left breast central inner --> mild proliferative fibrocystic change\par \par Treatment Summary\par 9/21/17 port placement\par 9/20/17 - 1/3/18  TCHP x 6 cycles (9/30/17 - 1/03/18\par 5/03/18 - 11/26/18  Trastuzumab (Herceptin) X every 3 weeks,  to complete 52 weeks.\par She had breast reconstruction on 8/14/18 with nipple reconstruction.\par \par 3/14/18 - She underwent right skin sparing mastectomy with SNL biopsy and left risk reduction mastectomy with BUNNY reconstruction.\par 1- Left breast, mastectomy- Fibrocystic changes\par 2- Right sentinel lymph node, excision- Three negative lymph nodes (0/3)\par 3- Right sentinel lymph node 2, excision- One negative lymph node (0/1)\par 4- Right breast, mastectomy- No residual invasive carcinoma (s/p neoadjuvant treatment)- No residual DCIS (s/p neoadjuvant treatment)\par  Pathologic staging: ypT0, ypN0\par \par  [de-identified] : Patient returns for follow up. \par S/p port removal on 6/19/19.\par Doing well very well. No complaints. No pain. \par No fevers, chills, or night sweats. No N/V/D/C. No SOB or DESAI.\par Denies any pain or leg swelling. \par \par  [de-identified] : HER2 positive

## 2019-12-09 NOTE — ASSESSMENT
[FreeTextEntry1] : 58 year old postmenopausal female with stage IIA (T2) ER-/NV-/HER2 positive right breast cancer.  S/p 6 cycles of neoadjuvant TCHP followed by bilateral mastectomy + BUNNY on 3/14/18, ypT0 ypN0 (pathologic complete response).  Completed 1 year of Herceptin on 11/26/18. \par \par Doing very well clinically. \par \par Plan: \par RTO 6 months\par

## 2019-12-09 NOTE — PHYSICAL EXAM
[Restricted in physically strenuous activity but ambulatory and able to carry out work of a light or sedentary nature] : Status 1- Restricted in physically strenuous activity but ambulatory and able to carry out work of a light or sedentary nature, e.g., light house work, office work [Normal] : grossly intact [de-identified] : No edema [de-identified] : supple [de-identified] : bilateral reconstructed breasts w/o nodularity [de-identified] : BS+, soft,

## 2019-12-16 ENCOUNTER — APPOINTMENT (OUTPATIENT)
Dept: SURGERY | Facility: CLINIC | Age: 59
End: 2019-12-16
Payer: COMMERCIAL

## 2019-12-16 VITALS
HEART RATE: 86 BPM | TEMPERATURE: 98.3 F | SYSTOLIC BLOOD PRESSURE: 175 MMHG | DIASTOLIC BLOOD PRESSURE: 106 MMHG | HEIGHT: 62 IN | WEIGHT: 197.05 LBS | BODY MASS INDEX: 36.26 KG/M2 | OXYGEN SATURATION: 98 %

## 2019-12-16 PROCEDURE — 99214 OFFICE O/P EST MOD 30 MIN: CPT

## 2019-12-16 NOTE — PHYSICAL EXAM
[Atraumatic] : atraumatic [Normocephalic] : normocephalic [PERRL] : pupils equal, round and reactive to light [Sclera nonicteric] : sclera nonicteric [Examined in the supine and seated position] : examined in the supine and seated position [No Supraclavicular Adenopathy] : no supraclavicular adenopathy [Supple] : supple [No dominant masses] : no dominant masses left breast [No dominant masses] : no dominant masses in right breast  [No Nipple Retraction] : no left nipple retraction [No Nipple Discharge] : no left nipple discharge [No Axillary Lymphadenopathy] : no left axillary lymphadenopathy [No Edema] : no edema [No Rashes] : no rashes [No Ulceration] : no ulceration [de-identified] : BUNNY flap viable with healing incisions. Right 5:00 1.5 cm mass (highly suspicious for fat necrosis). [de-identified] : BUNNY flap viable with healing incisions.

## 2019-12-16 NOTE — HISTORY OF PRESENT ILLNESS
[FreeTextEntry1] : I had the pleasure of seeing Monisha Young in the office for a follow up visit.  \par \par Monisha is a diana 58 yo postmenopausal female. She is s/p bilateral mastectomy with right SLNB for [id number] positive cancer after receiving neoadjuvant chemotherapy 3/14/2018.\par \par There is no evidence of residual disease in the right mastectomy.  She had a complete pathologic response to chemotherapy.\par \par Genetics: color: Negative\par \par She denies SOB, dizziness or chest pain. She denies significant pain at the surgical site(s).\par \par We discussed final pathology.  \par 3/14/2018 [facility]\par Left breast, mastectomy\par -Fibrocystic changes\par Right sentinel lymph node, excision\par -Three negative lymph nodes 0/3\par Right sentinel lymph node 2, excision \par -One negative lymph node 0/1\par Right breast, mastectomy\par -No residual invasive carcinoma s/p neoadjuvant treatment\par No residual DCIS s/p neoadjuvant treatment\par Biopsy site changes\par Radial scar\par Proliferative fibrocystic changes\par Pathologic staging: ypT0, ypNo\par \par We reviewed clinical examination and she is well healed.  She has undergone nipple reconstruction by Dr. Burleson on 8/14/2018.  She has completed chemotherapy for her HER2 positive breast cancer. She has a right breast mass at the 5 o'clock position which is scar tissue and has not changed since surgery since surgery.  Recommendation for  clinical breast exam in 6 months.  She understands and agrees to plan.\par \par \par All questions were answered.\par

## 2019-12-16 NOTE — ASSESSMENT
[FreeTextEntry1] : 56 yo postmenopausal female s/p bilateral mastectomy and R SLNB for Xtn3crn positive breast cancer.  She had a complete pathologic response to chemotherapy/targeted therapy. She underwent BUNNY reconstruction. She has completed her HER2 positive chemotherapy in November 2018.  There is a 1.5 cm mass in the right breast 5:00 position which is scar tissue and has not changed since surgery.  Recommendation to return to the office in 6 months.\par 1. Follow up with Dr. Reddy \par 2. Follow up with Dr. Burleson\par 3. Follow up in 6 months

## 2020-06-04 ENCOUNTER — OUTPATIENT (OUTPATIENT)
Dept: OUTPATIENT SERVICES | Facility: HOSPITAL | Age: 60
LOS: 1 days | Discharge: HOME | End: 2020-06-04

## 2020-06-04 DIAGNOSIS — C50.919 MALIGNANT NEOPLASM OF UNSPECIFIED SITE OF UNSPECIFIED FEMALE BREAST: ICD-10-CM

## 2020-06-04 DIAGNOSIS — Z98.890 OTHER SPECIFIED POSTPROCEDURAL STATES: Chronic | ICD-10-CM

## 2020-06-04 DIAGNOSIS — Z90.710 ACQUIRED ABSENCE OF BOTH CERVIX AND UTERUS: Chronic | ICD-10-CM

## 2020-06-04 DIAGNOSIS — Z90.13 ACQUIRED ABSENCE OF BILATERAL BREASTS AND NIPPLES: Chronic | ICD-10-CM

## 2020-06-04 DIAGNOSIS — Z98.891 HISTORY OF UTERINE SCAR FROM PREVIOUS SURGERY: Chronic | ICD-10-CM

## 2020-06-04 DIAGNOSIS — Z95.828 PRESENCE OF OTHER VASCULAR IMPLANTS AND GRAFTS: Chronic | ICD-10-CM

## 2020-06-09 ENCOUNTER — APPOINTMENT (OUTPATIENT)
Dept: HEMATOLOGY ONCOLOGY | Facility: CLINIC | Age: 60
End: 2020-06-09
Payer: COMMERCIAL

## 2020-06-09 PROCEDURE — 99214 OFFICE O/P EST MOD 30 MIN: CPT | Mod: 95

## 2020-06-09 NOTE — ASSESSMENT
[FreeTextEntry1] : 58 year old postmenopausal female with stage IIA (T2) ER-/NH-/HER2 positive right breast cancer.  S/p 6 cycles of neoadjuvant TCHP followed by bilateral mastectomy + BUNNY on 3/14/18, ypT0 ypN0 (pathologic complete response).  Completed 1 year of Herceptin on 11/26/18. \par \par Doing very well clinically.  No new concerning symptoms. \par \par Plan: \par Continue follow up with Dr. Gutierrez\par Follow up endo re: multinodular goiter\par Follow up with  me in 6 months. \par

## 2020-06-09 NOTE — HISTORY OF PRESENT ILLNESS
[Disease: _____________________] : Disease: [unfilled] [T: ___] : T[unfilled] [de-identified] : Ms. Young was diagnosed with HER2 positive right breast cancer at age 56 in August 2017.\par \par She initially presented with right nipple yellowish discharge.  Subsequent mammogram and ultrasound on 7/27/17 showed new spiculated mass in inner central posterior right breast with sonographic confirmation of suspicious mass at 3-4:00 region, and an indeterminate hypoechoic nodule at 1:00 right breast.\par \par On 8/3/17 she had right breast core biopsy of 3-4:00 mass --> pathology showed invasive poorly differentiated ductal carcinoma, Enzo grade 8/9, 0.9 cm--> ER-, RI-, HER2 IHC 3+ (positive)\par Right breast 1:00 --> fibrocystic change, proliferative type with nodular sclerosing adenosis\par \par 8/21/17 MRI breast -   Biopsy  clip  and  3.8  cm  area  of  irregular  non  mass  enhancement  in  the right  breast  at  the  3-4:00  location  posteriorly,  corresponding  to  recently\par diagnosed  invasive  carcinoma. Additional  irregular  areas  of  enhancement  in  the  right  breast  at  the 2-3:00  location  and  8-9:00  location,  suspicious  for  additional disease. Representative  MR  guided  core  biopsy  of  enhancement  at  the  8-9:00  location can  be  performed  to  evaluate  for  multicentric  disease,  if  it  would  change\par management. 8  mm  enhancing  nodule  demonstrating  washout  kinetics  in  the  upper slightly  inner  mid  left  breast.  MR  guided  core  biopsy  is  advised.\par Clip  artifact  in  the  right  breast  at  the  1:00  location  marking  site  of biopsy-proven  radial  scar.  No  associated  suspicious  enhancement.  Recommend\par appropriate  surgical  management.  2.1  cm  oval  T2  bright  lesion  in  the  liver,  probable  cyst  or  hemangioma.\par Recommend  dedicated  liver  ultrasound  for  further  evaluation.\par \par 8/28/17 MRI Core biopsy right breast lower outer quadrant --> DCIS, solid pattern, high grade atypia, focal necrosis. ER/RI pending\par Core biopsy left breast central inner --> mild proliferative fibrocystic change\par \par Treatment Summary\par 9/21/17 port placement\par 9/20/17 - 1/3/18  TCHP x 6 cycles (9/30/17 - 1/03/18\par 5/03/18 - 11/26/18  Trastuzumab (Herceptin) X every 3 weeks,  to complete 52 weeks.\par She had breast reconstruction on 8/14/18 with nipple reconstruction.\par S/p port removal on 6/19/19.\par \par 3/14/18 - She underwent right skin sparing mastectomy with SNL biopsy and left risk reduction mastectomy with BUNNY reconstruction.\par 1- Left breast, mastectomy- Fibrocystic changes\par 2- Right sentinel lymph node, excision- Three negative lymph nodes (0/3)\par 3- Right sentinel lymph node 2, excision- One negative lymph node (0/1)\par 4- Right breast, mastectomy- No residual invasive carcinoma (s/p neoadjuvant treatment)- No residual DCIS (s/p neoadjuvant treatment)\par  Pathologic staging: ypT0, ypN0\par \par  [de-identified] : HER2 positive [de-identified] : Patient returns for follow up. \par \par She feels well.\par She is working full time.\par No pain.\par No nausea/vomiting/headaches. \par No breast pain.\par No changes in weight. \par No bone pain. \par She has multinodular goiter. \par

## 2020-06-16 ENCOUNTER — APPOINTMENT (OUTPATIENT)
Dept: SURGERY | Facility: CLINIC | Age: 60
End: 2020-06-16

## 2020-06-30 ENCOUNTER — APPOINTMENT (OUTPATIENT)
Dept: ENDOCRINOLOGY | Facility: CLINIC | Age: 60
End: 2020-06-30
Payer: COMMERCIAL

## 2020-06-30 VITALS
WEIGHT: 196 LBS | DIASTOLIC BLOOD PRESSURE: 94 MMHG | BODY MASS INDEX: 36.07 KG/M2 | SYSTOLIC BLOOD PRESSURE: 150 MMHG | HEIGHT: 62 IN | HEART RATE: 86 BPM

## 2020-06-30 PROCEDURE — 99214 OFFICE O/P EST MOD 30 MIN: CPT

## 2020-06-30 RX ORDER — LOSARTAN POTASSIUM AND HYDROCHLOROTHIAZIDE 12.5; 5 MG/1; MG/1
50-12.5 TABLET ORAL DAILY
Refills: 0 | Status: DISCONTINUED | COMMUNITY
Start: 2019-05-28 | End: 2020-06-30

## 2020-06-30 NOTE — PHYSICAL EXAM
[Alert] : alert [Well Nourished] : well nourished [No Acute Distress] : no acute distress [EOMI] : extra ocular movement intact [Normal Sclera/Conjunctiva] : normal sclera/conjunctiva [Well Developed] : well developed [No Proptosis] : no proptosis [Normal Oropharynx] : the oropharynx was normal [Thyroid Not Enlarged] : the thyroid was not enlarged [No Thyroid Nodules] : no palpable thyroid nodules [No Respiratory Distress] : no respiratory distress [Clear to Auscultation] : lungs were clear to auscultation bilaterally [No Accessory Muscle Use] : no accessory muscle use [Normal Rate] : heart rate was normal [Regular Rhythm] : with a regular rhythm [Normal S1, S2] : normal S1 and S2 [No Edema] : no peripheral edema [Pedal Pulses Normal] : the pedal pulses are present [Normal Bowel Sounds] : normal bowel sounds [Not Distended] : not distended [Soft] : abdomen soft [Not Tender] : non-tender [Normal Anterior Cervical Nodes] : no anterior cervical lymphadenopathy [Normal Posterior Cervical Nodes] : no posterior cervical lymphadenopathy [Normal Gait] : normal gait [Spine Straight] : spine straight [No Rash] : no rash [No Tremors] : no tremors [Oriented x3] : oriented to person, place, and time [Acanthosis Nigricans] : no acanthosis nigricans

## 2020-06-30 NOTE — ASSESSMENT
[FreeTextEntry1] : NTMNG : no new compressive sx. \par no family h/o thyroid cancer, no neck XRT\par images of  tyroid Us shows multiple subcentimeter colloid cysts . \par check thyroid US to evaluate nodules for growth.  \par check tfts today \par \par obesity: she signed up for [employer] . She lost 10 lbs . \par encouraged more exercise walking 30 min 3 x week and continue bike 5 x week. \par Needs to try to have more protein for meals\par \par vitamin d deficiency: continue  vitamin d 1000 u qd \par check levels 25OHD now. \par she had DXA  2019 \par \par hLD; check lipids. \par HTN :  bp at target on meds. Continue current management.\par I advised low fat/low cholesterol diet, low salt diet, and weight loss\par \par \par

## 2020-07-13 ENCOUNTER — OUTPATIENT (OUTPATIENT)
Dept: OUTPATIENT SERVICES | Facility: HOSPITAL | Age: 60
LOS: 1 days | End: 2020-07-13

## 2020-07-13 DIAGNOSIS — Z90.710 ACQUIRED ABSENCE OF BOTH CERVIX AND UTERUS: Chronic | ICD-10-CM

## 2020-07-13 DIAGNOSIS — Z00.00 ENCOUNTER FOR GENERAL ADULT MEDICAL EXAMINATION WITHOUT ABNORMAL FINDINGS: ICD-10-CM

## 2020-07-13 DIAGNOSIS — Z98.890 OTHER SPECIFIED POSTPROCEDURAL STATES: Chronic | ICD-10-CM

## 2020-07-13 DIAGNOSIS — Z90.13 ACQUIRED ABSENCE OF BILATERAL BREASTS AND NIPPLES: Chronic | ICD-10-CM

## 2020-07-13 DIAGNOSIS — Z98.891 HISTORY OF UTERINE SCAR FROM PREVIOUS SURGERY: Chronic | ICD-10-CM

## 2020-07-13 DIAGNOSIS — Z95.828 PRESENCE OF OTHER VASCULAR IMPLANTS AND GRAFTS: Chronic | ICD-10-CM

## 2020-07-13 LAB
25(OH)D3 SERPL-MCNC: 32.7 NG/ML
ALBUMIN SERPL ELPH-MCNC: 4.9 G/DL
ALP BLD-CCNC: 98 U/L
ALT SERPL-CCNC: 14 U/L
ANION GAP SERPL CALC-SCNC: 15 MMOL/L
AST SERPL-CCNC: 19 U/L
BASOPHILS # BLD AUTO: 0.04 K/UL
BASOPHILS NFR BLD AUTO: 0.7 %
BILIRUB SERPL-MCNC: 0.5 MG/DL
BUN SERPL-MCNC: 19 MG/DL
CALCIUM SERPL-MCNC: 9.5 MG/DL
CHLORIDE SERPL-SCNC: 105 MMOL/L
CHOLEST SERPL-MCNC: 177 MG/DL
CHOLEST/HDLC SERPL: 3 RATIO
CO2 SERPL-SCNC: 24 MMOL/L
CREAT SERPL-MCNC: 0.88 MG/DL
CREAT SPEC-SCNC: 167 MG/DL
EOSINOPHIL # BLD AUTO: 0.24 K/UL
EOSINOPHIL NFR BLD AUTO: 4.1 %
GLUCOSE SERPL-MCNC: 95 MG/DL
HCT VFR BLD CALC: 42.8 %
HDLC SERPL-MCNC: 58 MG/DL
HGB BLD-MCNC: 13.8 G/DL
IMM GRANULOCYTES NFR BLD AUTO: 0.2 %
LDLC SERPL CALC-MCNC: 105 MG/DL
LDLC SERPL DIRECT ASSAY-MCNC: 111 MG/DL
LYMPHOCYTES # BLD AUTO: 1.2 K/UL
LYMPHOCYTES NFR BLD AUTO: 20.5 %
MAN DIFF?: NORMAL
MCHC RBC-ENTMCNC: 27.7 PG
MCHC RBC-ENTMCNC: 32.2 GM/DL
MCV RBC AUTO: 85.9 FL
MICROALBUMIN 24H UR DL<=1MG/L-MCNC: 1.5 MG/DL
MICROALBUMIN/CREAT 24H UR-RTO: 9 MG/G
MONOCYTES # BLD AUTO: 0.44 K/UL
MONOCYTES NFR BLD AUTO: 7.5 %
NEUTROPHILS # BLD AUTO: 3.93 K/UL
NEUTROPHILS NFR BLD AUTO: 67 %
PLATELET # BLD AUTO: 249 K/UL
POTASSIUM SERPL-SCNC: 4 MMOL/L
PROT SERPL-MCNC: 7 G/DL
RBC # BLD: 4.98 M/UL
RBC # FLD: 12.5 %
SODIUM SERPL-SCNC: 144 MMOL/L
T3 SERPL-MCNC: 119 NG/DL
T4 FREE SERPL-MCNC: 1.2 NG/DL
TRIGL SERPL-MCNC: 67 MG/DL
TSH SERPL-ACNC: 2.26 UIU/ML
WBC # FLD AUTO: 5.86 K/UL

## 2020-07-14 ENCOUNTER — APPOINTMENT (OUTPATIENT)
Dept: ULTRASOUND IMAGING | Facility: CLINIC | Age: 60
End: 2020-07-14
Payer: COMMERCIAL

## 2020-07-14 ENCOUNTER — OUTPATIENT (OUTPATIENT)
Dept: OUTPATIENT SERVICES | Facility: HOSPITAL | Age: 60
LOS: 1 days | End: 2020-07-14
Payer: COMMERCIAL

## 2020-07-14 ENCOUNTER — RESULT REVIEW (OUTPATIENT)
Age: 60
End: 2020-07-14

## 2020-07-14 DIAGNOSIS — E04.2 NONTOXIC MULTINODULAR GOITER: ICD-10-CM

## 2020-07-14 DIAGNOSIS — E78.5 HYPERLIPIDEMIA, UNSPECIFIED: ICD-10-CM

## 2020-07-14 DIAGNOSIS — Z90.13 ACQUIRED ABSENCE OF BILATERAL BREASTS AND NIPPLES: Chronic | ICD-10-CM

## 2020-07-14 DIAGNOSIS — Z98.890 OTHER SPECIFIED POSTPROCEDURAL STATES: Chronic | ICD-10-CM

## 2020-07-14 DIAGNOSIS — Z98.891 HISTORY OF UTERINE SCAR FROM PREVIOUS SURGERY: Chronic | ICD-10-CM

## 2020-07-14 DIAGNOSIS — I10 ESSENTIAL (PRIMARY) HYPERTENSION: ICD-10-CM

## 2020-07-14 DIAGNOSIS — Z90.710 ACQUIRED ABSENCE OF BOTH CERVIX AND UTERUS: Chronic | ICD-10-CM

## 2020-07-14 DIAGNOSIS — Z95.828 PRESENCE OF OTHER VASCULAR IMPLANTS AND GRAFTS: Chronic | ICD-10-CM

## 2020-07-14 DIAGNOSIS — R79.89 OTHER SPECIFIED ABNORMAL FINDINGS OF BLOOD CHEMISTRY: ICD-10-CM

## 2020-07-14 PROCEDURE — 76536 US EXAM OF HEAD AND NECK: CPT

## 2020-07-14 PROCEDURE — 76536 US EXAM OF HEAD AND NECK: CPT | Mod: 26

## 2020-10-20 ENCOUNTER — OUTPATIENT (OUTPATIENT)
Dept: OUTPATIENT SERVICES | Facility: HOSPITAL | Age: 60
LOS: 1 days | End: 2020-10-20
Payer: COMMERCIAL

## 2020-10-20 ENCOUNTER — APPOINTMENT (OUTPATIENT)
Dept: ULTRASOUND IMAGING | Facility: CLINIC | Age: 60
End: 2020-10-20
Payer: COMMERCIAL

## 2020-10-20 ENCOUNTER — RESULT REVIEW (OUTPATIENT)
Age: 60
End: 2020-10-20

## 2020-10-20 DIAGNOSIS — Z95.828 PRESENCE OF OTHER VASCULAR IMPLANTS AND GRAFTS: Chronic | ICD-10-CM

## 2020-10-20 DIAGNOSIS — Z98.890 OTHER SPECIFIED POSTPROCEDURAL STATES: Chronic | ICD-10-CM

## 2020-10-20 DIAGNOSIS — Z90.13 ACQUIRED ABSENCE OF BILATERAL BREASTS AND NIPPLES: Chronic | ICD-10-CM

## 2020-10-20 DIAGNOSIS — Z90.710 ACQUIRED ABSENCE OF BOTH CERVIX AND UTERUS: Chronic | ICD-10-CM

## 2020-10-20 DIAGNOSIS — N63.10 UNSPECIFIED LUMP IN THE RIGHT BREAST, UNSPECIFIED QUADRANT: ICD-10-CM

## 2020-10-20 DIAGNOSIS — Z98.891 HISTORY OF UTERINE SCAR FROM PREVIOUS SURGERY: Chronic | ICD-10-CM

## 2020-10-20 PROCEDURE — 76641 ULTRASOUND BREAST COMPLETE: CPT | Mod: 26,RT

## 2020-10-20 PROCEDURE — 76641 ULTRASOUND BREAST COMPLETE: CPT

## 2020-10-26 ENCOUNTER — OUTPATIENT (OUTPATIENT)
Dept: OUTPATIENT SERVICES | Facility: HOSPITAL | Age: 60
LOS: 1 days | End: 2020-10-26
Payer: COMMERCIAL

## 2020-10-26 ENCOUNTER — RESULT REVIEW (OUTPATIENT)
Age: 60
End: 2020-10-26

## 2020-10-26 ENCOUNTER — APPOINTMENT (OUTPATIENT)
Dept: ULTRASOUND IMAGING | Facility: CLINIC | Age: 60
End: 2020-10-26
Payer: COMMERCIAL

## 2020-10-26 DIAGNOSIS — N63.10 UNSPECIFIED LUMP IN THE RIGHT BREAST, UNSPECIFIED QUADRANT: ICD-10-CM

## 2020-10-26 DIAGNOSIS — Z90.13 ACQUIRED ABSENCE OF BILATERAL BREASTS AND NIPPLES: Chronic | ICD-10-CM

## 2020-10-26 DIAGNOSIS — Z98.891 HISTORY OF UTERINE SCAR FROM PREVIOUS SURGERY: Chronic | ICD-10-CM

## 2020-10-26 DIAGNOSIS — Z00.8 ENCOUNTER FOR OTHER GENERAL EXAMINATION: ICD-10-CM

## 2020-10-26 DIAGNOSIS — Z90.710 ACQUIRED ABSENCE OF BOTH CERVIX AND UTERUS: Chronic | ICD-10-CM

## 2020-10-26 DIAGNOSIS — Z98.890 OTHER SPECIFIED POSTPROCEDURAL STATES: Chronic | ICD-10-CM

## 2020-10-26 DIAGNOSIS — Z95.828 PRESENCE OF OTHER VASCULAR IMPLANTS AND GRAFTS: Chronic | ICD-10-CM

## 2020-10-26 DIAGNOSIS — R92.8 OTHER ABNORMAL AND INCONCLUSIVE FINDINGS ON DIAGNOSTIC IMAGING OF BREAST: ICD-10-CM

## 2020-10-26 PROCEDURE — 88305 TISSUE EXAM BY PATHOLOGIST: CPT | Mod: 26

## 2020-10-26 PROCEDURE — 88305 TISSUE EXAM BY PATHOLOGIST: CPT

## 2020-10-26 PROCEDURE — 19083 BX BREAST 1ST LESION US IMAG: CPT

## 2020-10-26 PROCEDURE — A4648: CPT

## 2020-10-26 PROCEDURE — 88360 TUMOR IMMUNOHISTOCHEM/MANUAL: CPT

## 2020-10-26 PROCEDURE — 77065 DX MAMMO INCL CAD UNI: CPT | Mod: 26,RT

## 2020-10-26 PROCEDURE — 88360 TUMOR IMMUNOHISTOCHEM/MANUAL: CPT | Mod: 26

## 2020-10-26 PROCEDURE — 77065 DX MAMMO INCL CAD UNI: CPT

## 2020-10-26 PROCEDURE — 19083 BX BREAST 1ST LESION US IMAG: CPT | Mod: RT

## 2020-10-27 LAB — SURGICAL PATHOLOGY STUDY: SIGNIFICANT CHANGE UP

## 2020-10-29 NOTE — H&P PST ADULT - HISTORY OF PRESENT ILLNESS
56F with diagnosis of breast cancer diagnosed in August 2017. Found on routine mammography, biopsies done and verified diagnosis. 2 different diagnosis of Noninvasive Carcinoma and stage 2 carcinoma, estrogen positive.
Universal Safety Interventions

## 2020-11-02 ENCOUNTER — OUTPATIENT (OUTPATIENT)
Dept: OUTPATIENT SERVICES | Facility: HOSPITAL | Age: 60
LOS: 1 days | End: 2020-11-02

## 2020-11-02 ENCOUNTER — APPOINTMENT (OUTPATIENT)
Dept: NUCLEAR MEDICINE | Facility: CLINIC | Age: 60
End: 2020-11-02
Payer: COMMERCIAL

## 2020-11-02 ENCOUNTER — APPOINTMENT (OUTPATIENT)
Dept: CT IMAGING | Facility: CLINIC | Age: 60
End: 2020-11-02
Payer: COMMERCIAL

## 2020-11-02 ENCOUNTER — RESULT REVIEW (OUTPATIENT)
Age: 60
End: 2020-11-02

## 2020-11-02 DIAGNOSIS — Z98.890 OTHER SPECIFIED POSTPROCEDURAL STATES: Chronic | ICD-10-CM

## 2020-11-02 DIAGNOSIS — Z90.13 ACQUIRED ABSENCE OF BILATERAL BREASTS AND NIPPLES: Chronic | ICD-10-CM

## 2020-11-02 DIAGNOSIS — Z95.828 PRESENCE OF OTHER VASCULAR IMPLANTS AND GRAFTS: Chronic | ICD-10-CM

## 2020-11-02 DIAGNOSIS — Z98.891 HISTORY OF UTERINE SCAR FROM PREVIOUS SURGERY: Chronic | ICD-10-CM

## 2020-11-02 DIAGNOSIS — R92.8 OTHER ABNORMAL AND INCONCLUSIVE FINDINGS ON DIAGNOSTIC IMAGING OF BREAST: ICD-10-CM

## 2020-11-02 DIAGNOSIS — Z90.710 ACQUIRED ABSENCE OF BOTH CERVIX AND UTERUS: Chronic | ICD-10-CM

## 2020-11-02 PROCEDURE — 74177 CT ABD & PELVIS W/CONTRAST: CPT | Mod: 26

## 2020-11-02 PROCEDURE — 78306 BONE IMAGING WHOLE BODY: CPT | Mod: 26

## 2020-11-02 PROCEDURE — 71260 CT THORAX DX C+: CPT | Mod: 26

## 2020-11-03 ENCOUNTER — OUTPATIENT (OUTPATIENT)
Dept: OUTPATIENT SERVICES | Facility: HOSPITAL | Age: 60
LOS: 1 days | End: 2020-11-03
Payer: COMMERCIAL

## 2020-11-03 ENCOUNTER — RESULT REVIEW (OUTPATIENT)
Age: 60
End: 2020-11-03

## 2020-11-03 ENCOUNTER — APPOINTMENT (OUTPATIENT)
Dept: ULTRASOUND IMAGING | Facility: CLINIC | Age: 60
End: 2020-11-03
Payer: COMMERCIAL

## 2020-11-03 ENCOUNTER — APPOINTMENT (OUTPATIENT)
Dept: SURGERY | Facility: CLINIC | Age: 60
End: 2020-11-03
Payer: COMMERCIAL

## 2020-11-03 VITALS
OXYGEN SATURATION: 100 % | HEART RATE: 94 BPM | SYSTOLIC BLOOD PRESSURE: 189 MMHG | BODY MASS INDEX: 34.41 KG/M2 | WEIGHT: 187 LBS | DIASTOLIC BLOOD PRESSURE: 112 MMHG | HEIGHT: 62 IN

## 2020-11-03 DIAGNOSIS — N64.89 OTHER SPECIFIED DISORDERS OF BREAST: ICD-10-CM

## 2020-11-03 DIAGNOSIS — Z95.828 PRESENCE OF OTHER VASCULAR IMPLANTS AND GRAFTS: Chronic | ICD-10-CM

## 2020-11-03 DIAGNOSIS — Z90.13 ACQUIRED ABSENCE OF BILATERAL BREASTS AND NIPPLES: Chronic | ICD-10-CM

## 2020-11-03 DIAGNOSIS — Z98.890 OTHER SPECIFIED POSTPROCEDURAL STATES: Chronic | ICD-10-CM

## 2020-11-03 DIAGNOSIS — Z90.710 ACQUIRED ABSENCE OF BOTH CERVIX AND UTERUS: Chronic | ICD-10-CM

## 2020-11-03 DIAGNOSIS — Z98.891 HISTORY OF UTERINE SCAR FROM PREVIOUS SURGERY: Chronic | ICD-10-CM

## 2020-11-03 PROCEDURE — 99214 OFFICE O/P EST MOD 30 MIN: CPT

## 2020-11-03 PROCEDURE — 76642 ULTRASOUND BREAST LIMITED: CPT | Mod: 26,RT

## 2020-11-03 PROCEDURE — 76642 ULTRASOUND BREAST LIMITED: CPT

## 2020-11-03 PROCEDURE — 99072 ADDL SUPL MATRL&STAF TM PHE: CPT

## 2020-11-04 ENCOUNTER — APPOINTMENT (OUTPATIENT)
Dept: DISASTER EMERGENCY | Facility: CLINIC | Age: 60
End: 2020-11-04

## 2020-11-04 ENCOUNTER — OUTPATIENT (OUTPATIENT)
Dept: OUTPATIENT SERVICES | Facility: HOSPITAL | Age: 60
LOS: 1 days | End: 2020-11-04
Payer: COMMERCIAL

## 2020-11-04 VITALS
TEMPERATURE: 98 F | WEIGHT: 186.29 LBS | RESPIRATION RATE: 16 BRPM | HEIGHT: 62 IN | DIASTOLIC BLOOD PRESSURE: 82 MMHG | HEART RATE: 87 BPM | SYSTOLIC BLOOD PRESSURE: 130 MMHG

## 2020-11-04 DIAGNOSIS — Z98.890 OTHER SPECIFIED POSTPROCEDURAL STATES: Chronic | ICD-10-CM

## 2020-11-04 DIAGNOSIS — Z01.818 ENCOUNTER FOR OTHER PREPROCEDURAL EXAMINATION: ICD-10-CM

## 2020-11-04 DIAGNOSIS — I10 ESSENTIAL (PRIMARY) HYPERTENSION: ICD-10-CM

## 2020-11-04 DIAGNOSIS — Z98.891 HISTORY OF UTERINE SCAR FROM PREVIOUS SURGERY: Chronic | ICD-10-CM

## 2020-11-04 DIAGNOSIS — Z95.828 PRESENCE OF OTHER VASCULAR IMPLANTS AND GRAFTS: Chronic | ICD-10-CM

## 2020-11-04 DIAGNOSIS — Z29.9 ENCOUNTER FOR PROPHYLACTIC MEASURES, UNSPECIFIED: ICD-10-CM

## 2020-11-04 DIAGNOSIS — C50.911 MALIGNANT NEOPLASM OF UNSPECIFIED SITE OF RIGHT FEMALE BREAST: ICD-10-CM

## 2020-11-04 DIAGNOSIS — Z90.710 ACQUIRED ABSENCE OF BOTH CERVIX AND UTERUS: Chronic | ICD-10-CM

## 2020-11-04 DIAGNOSIS — Z13.89 ENCOUNTER FOR SCREENING FOR OTHER DISORDER: ICD-10-CM

## 2020-11-04 DIAGNOSIS — Z90.13 ACQUIRED ABSENCE OF BILATERAL BREASTS AND NIPPLES: ICD-10-CM

## 2020-11-04 DIAGNOSIS — Z90.13 ACQUIRED ABSENCE OF BILATERAL BREASTS AND NIPPLES: Chronic | ICD-10-CM

## 2020-11-04 LAB
A1C WITH ESTIMATED AVERAGE GLUCOSE RESULT: 5.3 % — SIGNIFICANT CHANGE UP (ref 4–5.6)
ALBUMIN SERPL ELPH-MCNC: 4.3 G/DL — SIGNIFICANT CHANGE UP (ref 3.3–5.2)
ALP SERPL-CCNC: 107 U/L — SIGNIFICANT CHANGE UP (ref 40–120)
ALT FLD-CCNC: 13 U/L — SIGNIFICANT CHANGE UP
ANION GAP SERPL CALC-SCNC: 11 MMOL/L — SIGNIFICANT CHANGE UP (ref 5–17)
APTT BLD: 33.1 SEC — SIGNIFICANT CHANGE UP (ref 27.5–35.5)
AST SERPL-CCNC: 21 U/L — SIGNIFICANT CHANGE UP
BASOPHILS # BLD AUTO: 0.05 K/UL — SIGNIFICANT CHANGE UP (ref 0–0.2)
BASOPHILS NFR BLD AUTO: 1 % — SIGNIFICANT CHANGE UP (ref 0–2)
BILIRUB SERPL-MCNC: 0.5 MG/DL — SIGNIFICANT CHANGE UP (ref 0.4–2)
BLD GP AB SCN SERPL QL: SIGNIFICANT CHANGE UP
BUN SERPL-MCNC: 16 MG/DL — SIGNIFICANT CHANGE UP (ref 8–20)
CALCIUM SERPL-MCNC: 9.3 MG/DL — SIGNIFICANT CHANGE UP (ref 8.6–10.2)
CHLORIDE SERPL-SCNC: 102 MMOL/L — SIGNIFICANT CHANGE UP (ref 98–107)
CO2 SERPL-SCNC: 27 MMOL/L — SIGNIFICANT CHANGE UP (ref 22–29)
CREAT SERPL-MCNC: 0.67 MG/DL — SIGNIFICANT CHANGE UP (ref 0.5–1.3)
EOSINOPHIL # BLD AUTO: 0.19 K/UL — SIGNIFICANT CHANGE UP (ref 0–0.5)
EOSINOPHIL NFR BLD AUTO: 3.7 % — SIGNIFICANT CHANGE UP (ref 0–6)
ESTIMATED AVERAGE GLUCOSE: 105 MG/DL — SIGNIFICANT CHANGE UP (ref 68–114)
GLUCOSE SERPL-MCNC: 86 MG/DL — SIGNIFICANT CHANGE UP (ref 70–99)
HCT VFR BLD CALC: 41.1 % — SIGNIFICANT CHANGE UP (ref 34.5–45)
HGB BLD-MCNC: 13.3 G/DL — SIGNIFICANT CHANGE UP (ref 11.5–15.5)
IMM GRANULOCYTES NFR BLD AUTO: 0.2 % — SIGNIFICANT CHANGE UP (ref 0–1.5)
INR BLD: 1.05 RATIO — SIGNIFICANT CHANGE UP (ref 0.88–1.16)
LYMPHOCYTES # BLD AUTO: 1.4 K/UL — SIGNIFICANT CHANGE UP (ref 1–3.3)
LYMPHOCYTES # BLD AUTO: 27 % — SIGNIFICANT CHANGE UP (ref 13–44)
MCHC RBC-ENTMCNC: 27.7 PG — SIGNIFICANT CHANGE UP (ref 27–34)
MCHC RBC-ENTMCNC: 32.4 GM/DL — SIGNIFICANT CHANGE UP (ref 32–36)
MCV RBC AUTO: 85.4 FL — SIGNIFICANT CHANGE UP (ref 80–100)
MONOCYTES # BLD AUTO: 0.38 K/UL — SIGNIFICANT CHANGE UP (ref 0–0.9)
MONOCYTES NFR BLD AUTO: 7.3 % — SIGNIFICANT CHANGE UP (ref 2–14)
NEUTROPHILS # BLD AUTO: 3.16 K/UL — SIGNIFICANT CHANGE UP (ref 1.8–7.4)
NEUTROPHILS NFR BLD AUTO: 60.8 % — SIGNIFICANT CHANGE UP (ref 43–77)
PLATELET # BLD AUTO: 249 K/UL — SIGNIFICANT CHANGE UP (ref 150–400)
POTASSIUM SERPL-MCNC: 3.9 MMOL/L — SIGNIFICANT CHANGE UP (ref 3.5–5.3)
POTASSIUM SERPL-SCNC: 3.9 MMOL/L — SIGNIFICANT CHANGE UP (ref 3.5–5.3)
PROT SERPL-MCNC: 7.1 G/DL — SIGNIFICANT CHANGE UP (ref 6.6–8.7)
PROTHROM AB SERPL-ACNC: 12.2 SEC — SIGNIFICANT CHANGE UP (ref 10.6–13.6)
RBC # BLD: 4.81 M/UL — SIGNIFICANT CHANGE UP (ref 3.8–5.2)
RBC # FLD: 11.9 % — SIGNIFICANT CHANGE UP (ref 10.3–14.5)
SODIUM SERPL-SCNC: 140 MMOL/L — SIGNIFICANT CHANGE UP (ref 135–145)
WBC # BLD: 5.19 K/UL — SIGNIFICANT CHANGE UP (ref 3.8–10.5)
WBC # FLD AUTO: 5.19 K/UL — SIGNIFICANT CHANGE UP (ref 3.8–10.5)

## 2020-11-04 PROCEDURE — 93010 ELECTROCARDIOGRAM REPORT: CPT

## 2020-11-04 PROCEDURE — 93005 ELECTROCARDIOGRAM TRACING: CPT

## 2020-11-04 PROCEDURE — G0463: CPT

## 2020-11-04 RX ORDER — OLMESARTAN MEDOXOMIL-HYDROCHLOROTHIAZIDE 25; 40 MG/1; MG/1
1 TABLET, FILM COATED ORAL
Qty: 0 | Refills: 0 | DISCHARGE

## 2020-11-04 RX ORDER — TRASTUZUMAB-DKST 420 MG/20ML
0 INJECTION, POWDER, LYOPHILIZED, FOR SOLUTION INTRAVENOUS
Qty: 0 | Refills: 0 | DISCHARGE

## 2020-11-04 RX ORDER — LOSARTAN POTASSIUM 100 MG/1
1 TABLET, FILM COATED ORAL
Qty: 0 | Refills: 0 | DISCHARGE

## 2020-11-04 RX ORDER — POTASSIUM CHLORIDE 20 MEQ
1 PACKET (EA) ORAL
Qty: 0 | Refills: 0 | DISCHARGE

## 2020-11-04 RX ORDER — METHYLPREDNISOLONE 4 MG
1 TABLET ORAL
Qty: 0 | Refills: 0 | DISCHARGE

## 2020-11-04 NOTE — ASU PATIENT PROFILE, ADULT - PMH
Anemia    At risk for sleep apnea    Breast cancer  Right Dx 8/2017  Breast complaint  Absent breast nipples  Fibroids    Hypertension

## 2020-11-04 NOTE — H&P PST ADULT - HISTORY OF PRESENT ILLNESS
61 y/o female presents to PST today. Patient reports hx of breast cancer in 2017 with  doubble mastectomy 3/2018. Patient reported recently felt a lump in her right breast and was told 10/2020 brast cancer has returned. Denies any chest pain, SOB. 59 y/o female presents to PST today. Patient reports hx of breast cancer in 2017 with  doubble mastectomy 3/2018. Patient reports that she recently felt a lump in her right breast and was told 10/2020 breast cancer has returned. Denies any chest pain, SOB, fever, chills. Scheduled for re-excision right mastectomy flap 11/6/2020

## 2020-11-04 NOTE — HISTORY OF PRESENT ILLNESS
[FreeTextEntry1] : I had the pleasure of seeing Monisha Young in the office today to review biopsy and Imaging.\par \par Monisha is a diana 61 yo postmenopausal female. She is s/p bilateral mastectomy with right SLNB for Uug3cse positive cancer after receiving neoadjuvant chemotherapy 3/14/2018.\par \par She underwent right breast US on 10/20/2020 after palpable a right breast lump.  US demonstrated a 3.5 cm mass in the right breast 4:00 9 cmfn with recommendation of biopsy.  Biopsy was performed on 10/26/20 and demonstrated right breast IDC grade 3 ER 0% NV 0% Her2 postive.  CT C/A/P was preformed and demonstrated the 3.1 cm mass and borderline right axilla lymph node.  She was then sent for right axilla US and US came back with benign lymph node.  \par \par Genetics: color: Negative\par \par We discussed final pathology.  \par 3/14/2018 [facility]\par Left breast, mastectomy\par -Fibrocystic changes\par Right sentinel lymph node, excision\par -Three negative lymph nodes 0/3\par Right sentinel lymph node 2, excision \par -One negative lymph node 0/1\par Right breast, mastectomy\par -No residual invasive carcinoma s/p neoadjuvant treatment\par No residual DCIS s/p neoadjuvant treatment\par Biopsy site changes\par Radial scar\par Proliferative fibrocystic changes\par Pathologic staging: ypT0, ypNo\par \par 10/20/2020  US breast complete RT\par Impression:  No suspicious mass at the area of the lump/  Ultrasound guided biopsy recommended.\par \par 10/26/2020  Pathology\par breast right 4 o'clcok, 9 cmfn, ultrasound guided core biopsy:  Invasive poorly differentiated ductal carcinoma with focal necrosis and foci with micropapillary features.  Enzo score 8/9.  Invasive tumor measures at least 1.5 cm.  ER 0% NV 0 % Her2 negative.\par \par 11/2/2020  CT Chest/Abdomen/Pelvis\par IMPRESSION:Right breast mass corresponding to known carcinoma recurrence.\par Borderline right axillary lymph node.No metastatic lesion otherwise visualized.\par \par 11/2/2020  NM Bone IMG Whole Body\par Impression:  No radionuclide evidence of osseous metastases.\par \par 11/3/2020  US breast limited RT\par Impression:  Benign appearing right axillary lymph nodes.  Recommend surgical/oncologic management.  BIRADS 2 Benign findings.\par \par We reviewed biopsy results and she understands biopsy demonstrated recurrence of right breast IDC grade 3 ER 0% NV 0% Her2 positive.  She will follow up with Dr. Reddy and Dr. Liu after surgical procedure for chemotherapy and radiation.   CT C/A/P was preformed and demonstrated the 3.1 cm mass and borderline right axilla lymph node.  She was then sent for right axilla US and US came back with benign lymph node.  \par \par Plan for right mastectomy flap excision/chest excision.  She and her  understand and agree to the plan.  All questions were answered.\par

## 2020-11-04 NOTE — ASSESSMENT
[FreeTextEntry1] : 61 yo presents for followup after recent right breast biopsy demonstrated recurrence of right breast IDC grade 3 ER 0% DC 0% Her2 positive (3.5 cm).  CT C/A/P was preformed and demonstrated the 3.1 cm mass and borderline right axilla lymph node.  She was then sent for right axilla US and US came back with benign lymph node.  Plan for right mastectomy flap excision/chest excision\par 1. Follow up with Dr. Reddy after surgery\par 2. Follow up with Dr. Burleson\par 3. Consult with radiation oncologist after surgery\par 4.  Right mastectomy flap excision/chest excision

## 2020-11-04 NOTE — H&P PST ADULT - ATTENDING COMMENTS
Patient with history of IDC grade 2 ER - CO - Afh1zcm positive breast cancer in 2017 treated with neoadjuvant chemotherapy with complete response now with right medial/inferior flap recurrence undergoing wide excision with reconstruction. Patient has a history of BUNNY reconstruction in 2018. No evidence of distant metastatic disease or lymphadenopathy. She understands technical aspects or procedure as well as risks v benefits. All questions were answered.

## 2020-11-04 NOTE — ASU PATIENT PROFILE, ADULT - LEARNING ASSESSMENT (PATIENT) ADDITIONAL COMMENTS
NP, instructed pt on pre-op instructed pt on pre-op instructions/teaching, tips for safer surgery, pain management scale, pre-surgical infection prevention instructions, covid swab done today off site location. Pt verbalized understanding of all instructions given by NP.

## 2020-11-04 NOTE — H&P PST ADULT - NSICDXPASTMEDICALHX_GEN_ALL_CORE_FT
PAST MEDICAL HISTORY:  Anemia     At risk for sleep apnea     Breast cancer Right Dx 8/2017    Breast complaint Absent breast nipples    Fibroids     Hypertension

## 2020-11-04 NOTE — H&P PST ADULT - NSICDXPASTSURGICALHX_GEN_ALL_CORE_FT
PAST SURGICAL HISTORY:  H/O bilateral mastectomy With BUNNY flap 2018    History of  section 1994    History of hysterectomy 2013    History of oral surgery     Port-a-cath in place chemoport left chest wall 2017    Status post endometrial ablation 2013

## 2020-11-04 NOTE — H&P PST ADULT - NSICDXFAMILYHX_GEN_ALL_CORE_FT
FAMILY HISTORY:  Father  Still living? No  Family history of lung cancer, Age at diagnosis: 71-80    Mother  Still living? No  Family history of lung cancer, Age at diagnosis: 61-70    Child  Still living? Yes, Estimated age: Age Unknown  Family history of fibromyalgia, Age at diagnosis: Age Unknown

## 2020-11-04 NOTE — H&P PST ADULT - REASON FOR ADMISSION
" lumpectomy and whatever else surgeon told me for  the breast" " lumpectomy and whatever else surgeon told me for  the breast" "not sure of medical term"

## 2020-11-04 NOTE — H&P PST ADULT - NSICDXPROBLEM_GEN_ALL_CORE_FT
PROBLEM DIAGNOSES  Problem: Acquired absence of breast and nipple, bilateral  Assessment and Plan: Scheduled for re-excision right mastectomy flap 11/6/2020  Medical clearance    Problem: Malignant neoplasm of unspecified site of right female breast  Assessment and Plan: Scheduled for re-excision right mastectomy flap 11/6/2020  Medical clearance    Problem: Need for prophylactic measure  Assessment and Plan: caprini 4- Primary team please asssess need for DVT prophylaxis    Problem: Screening for substance abuse  Assessment and Plan: ORT 0    Problem: Hypertension  Assessment and Plan: Follows with PCP

## 2020-11-04 NOTE — H&P PST ADULT - ASSESSMENT
61 y/o female scheduled for re-excision right mastectomy flap 2020      OPIOID RISK TOOL    VENESSA EACH BOX THAT APPLIES AND ADD TOTALS AT THE END    FAMILY HISTORY OF SUBSTANCE ABUSE                 FEMALE         MALE                                                Alcohol                             [  ]1 pt          [  ]3pts                                               Illegal Durgs                     [  ]2 pts        [  ]3pts                                               Rx Drugs                           [  ]4 pts        [  ]4 pts    PERSONAL HISTORY OF SUBSTANCE ABUSE                                                                                          Alcohol                             [  ]3 pts       [  ]3 pts                                               Illegal Drugs                     [  ]4 pts        [  ]4 pts                                               Rx Drugs                           [  ]5 pts        [  ]5 pts    AGE BETWEEN 16-45 YEARS                                      [  ]1 pt         [  ]1 pt    HISTORY OF PREADOLESCENT   SEXUAL ABUSE                                                             [  ]3 pts        [  ]0pts    PSYCHOLOGICAL DISEASE                     ADD, OCD, Bipolar, Schizophrenia        [  ]2 pts         [  ]2 pts                      Depression                                               [  ]1 pt           [  ]1 pt           SCORING TOTAL   (add numbers and type here)              (0)                                     A score of 3 or lower indicated LOW risk for future opioid abuse  A score of 4 to 7 indicated moderate risk for future opioid abuse  A score of 8 or higher indicates a high risk for opioid abuse    CAPRINI SCORE [CLOT]    AGE RELATED RISK FACTORS                                                       MOBILITY RELATED FACTORS  [ x] Age 41-60 years                                            (1 Point)                  [ ] Bed rest                                                        (1 Point)  [ ] Age: 61-74 years                                           (2 Points)                 [ ] Plaster cast                                                   (2 Points)  [ ] Age= 75 years                                              (3 Points)                 [ ] Bed bound for more than 72 hours                 (2 Points)    DISEASE RELATED RISK FACTORS                                               GENDER SPECIFIC FACTORS  [ ] Edema in the lower extremities                       (1 Point)                  [ ] Pregnancy                                                     (1 Point)  [ ] Varicose veins                                               (1 Point)                  [ ] Post-partum < 6 weeks                                   (1 Point)             [x ] BMI > 25 Kg/m2                                            (1 Point)                  [ ] Hormonal therapy  or oral contraception          (1 Point)                 [ ] Sepsis (in the previous month)                        (1 Point)                  [ ] History of pregnancy complications                 (1 point)  [ ] Pneumonia or serious lung disease                                               [ ] Unexplained or recurrent                     (1 Point)           (in the previous month)                               (1 Point)  [ ] Abnormal pulmonary function test                     (1 Point)                 SURGERY RELATED RISK FACTORS  [ ] Acute myocardial infarction                              (1 Point)                 [ ]  Section                                             (1 Point)  [ ] Congestive heart failure (in the previous month)  (1 Point)               [ ] Minor surgery                                                  (1 Point)   [ ] Inflammatory bowel disease                             (1 Point)                 [ ] Arthroscopic surgery                                        (2 Points)  [ ] Central venous access                                      (2 Points)                [ x] General surgery lasting more than 45 minutes   (2 Points)       [ ] Stroke (in the previous month)                          (5 Points)               [ ] Elective arthroplasty                                         (5 Points)                                                                                                                                               HEMATOLOGY RELATED FACTORS                                                 TRAUMA RELATED RISK FACTORS  [ ] Prior episodes of VTE                                     (3 Points)                [ ] Fracture of the hip, pelvis, or leg                       (5 Points)  [ ] Positive family history for VTE                         (3 Points)                 [ ] Acute spinal cord injury (in the previous month)  (5 Points)  [ ] Prothrombin 81085 A                                     (3 Points)                 [ ] Paralysis  (less than 1 month)                             (5 Points)  [ ] Factor V Leiden                                             (3 Points)                  [ ] Multiple Trauma within 1 month                        (5 Points)  [ ] Lupus anticoagulants                                     (3 Points)                                                           [ ] Anticardiolipin antibodies                               (3 Points)                                                       [ ] High homocysteine in the blood                      (3 Points)                                             [ ] Other congenital or acquired thrombophilia      (3 Points)                                                [ ] Heparin induced thrombocytopenia                  (3 Points)                                          Total Score [       4   ]    Caprini Score 0 - 2:  Low Risk, No VTE Prophylaxis required for most patients, encourage ambulation  Caprini Score 3 - 6:  At Risk, pharmacologic VTE prophylaxis is indicated for most patients (in the absence of a contraindication)  Caprini Score Greater than or = 7:  High Risk, pharmacologic VTE prophylaxis is indicated for most patients (in the absence of a contraindication)

## 2020-11-04 NOTE — PHYSICAL EXAM
[Normocephalic] : normocephalic [Atraumatic] : atraumatic [PERRL] : pupils equal, round and reactive to light [Sclera nonicteric] : sclera nonicteric [Supple] : supple [No Supraclavicular Adenopathy] : no supraclavicular adenopathy [Examined in the supine and seated position] : examined in the supine and seated position [No dominant masses] : no dominant masses in right breast  [No dominant masses] : no dominant masses left breast [No Nipple Retraction] : no left nipple retraction [No Nipple Discharge] : no left nipple discharge [No Axillary Lymphadenopathy] : no left axillary lymphadenopathy [No Edema] : no edema [No Rashes] : no rashes [No Ulceration] : no ulceration [de-identified] : BUNNY flap well healed. Right 5:00 4 cm mass which correlates with recently biopsied breast carcinoma. [de-identified] : BUNNY flap viable well healed.

## 2020-11-04 NOTE — H&P PST ADULT - BP NONINVASIVE MEAN (MM HG)
H/O nephrostomy  Total Left 1/8/2014  H/O partial nephrectomy  left 5/23/2011  History of nephrectomy  left 2014  Lipoma  excised from left arm 1990  S/P Colonoscopy  2008  wnl  S/P Tonsillectomy  child  Sigmoidoscopy exam  prior to 2008 98

## 2020-11-05 PROBLEM — Z91.89 OTHER SPECIFIED PERSONAL RISK FACTORS, NOT ELSEWHERE CLASSIFIED: Chronic | Status: ACTIVE | Noted: 2020-11-04

## 2020-11-05 LAB — SARS-COV-2 N GENE NPH QL NAA+PROBE: NOT DETECTED

## 2020-11-06 ENCOUNTER — APPOINTMENT (OUTPATIENT)
Dept: SURGERY | Facility: HOSPITAL | Age: 60
End: 2020-11-06
Payer: COMMERCIAL

## 2020-11-06 ENCOUNTER — RESULT REVIEW (OUTPATIENT)
Age: 60
End: 2020-11-06

## 2020-11-06 ENCOUNTER — OUTPATIENT (OUTPATIENT)
Dept: INPATIENT UNIT | Facility: HOSPITAL | Age: 60
LOS: 1 days | Discharge: HOME | End: 2020-11-06
Payer: COMMERCIAL

## 2020-11-06 VITALS
RESPIRATION RATE: 18 BRPM | WEIGHT: 186.07 LBS | OXYGEN SATURATION: 99 % | TEMPERATURE: 98 F | HEART RATE: 73 BPM | HEIGHT: 62 IN | DIASTOLIC BLOOD PRESSURE: 120 MMHG | SYSTOLIC BLOOD PRESSURE: 182 MMHG

## 2020-11-06 VITALS
OXYGEN SATURATION: 100 % | RESPIRATION RATE: 14 BRPM | TEMPERATURE: 98 F | DIASTOLIC BLOOD PRESSURE: 85 MMHG | HEART RATE: 73 BPM | SYSTOLIC BLOOD PRESSURE: 159 MMHG

## 2020-11-06 DIAGNOSIS — T82.9XXA UNSPECIFIED COMPLICATION OF CARDIAC AND VASCULAR PROSTHETIC DEVICE, IMPLANT AND GRAFT, INITIAL ENCOUNTER: Chronic | ICD-10-CM

## 2020-11-06 DIAGNOSIS — Z98.890 OTHER SPECIFIED POSTPROCEDURAL STATES: Chronic | ICD-10-CM

## 2020-11-06 DIAGNOSIS — Z90.710 ACQUIRED ABSENCE OF BOTH CERVIX AND UTERUS: Chronic | ICD-10-CM

## 2020-11-06 DIAGNOSIS — Z95.828 PRESENCE OF OTHER VASCULAR IMPLANTS AND GRAFTS: Chronic | ICD-10-CM

## 2020-11-06 DIAGNOSIS — Z90.13 ACQUIRED ABSENCE OF BILATERAL BREASTS AND NIPPLES: Chronic | ICD-10-CM

## 2020-11-06 DIAGNOSIS — C50.911 MALIGNANT NEOPLASM OF UNSPECIFIED SITE OF RIGHT FEMALE BREAST: ICD-10-CM

## 2020-11-06 DIAGNOSIS — Z90.13 ACQUIRED ABSENCE OF BILATERAL BREASTS AND NIPPLES: ICD-10-CM

## 2020-11-06 DIAGNOSIS — Z98.891 HISTORY OF UTERINE SCAR FROM PREVIOUS SURGERY: Chronic | ICD-10-CM

## 2020-11-06 PROCEDURE — 88305 TISSUE EXAM BY PATHOLOGIST: CPT

## 2020-11-06 PROCEDURE — 88307 TISSUE EXAM BY PATHOLOGIST: CPT

## 2020-11-06 PROCEDURE — 88360 TUMOR IMMUNOHISTOCHEM/MANUAL: CPT

## 2020-11-06 PROCEDURE — XXXXX: CPT

## 2020-11-06 PROCEDURE — 88307 TISSUE EXAM BY PATHOLOGIST: CPT | Mod: 26

## 2020-11-06 PROCEDURE — 88305 TISSUE EXAM BY PATHOLOGIST: CPT | Mod: 26

## 2020-11-06 PROCEDURE — C9290: CPT

## 2020-11-06 PROCEDURE — 88360 TUMOR IMMUNOHISTOCHEM/MANUAL: CPT | Mod: 26

## 2020-11-06 RX ORDER — ACETAMINOPHEN 500 MG
1000 TABLET ORAL ONCE
Refills: 0 | Status: DISCONTINUED | OUTPATIENT
Start: 2020-11-06 | End: 2020-11-06

## 2020-11-06 RX ORDER — OXYCODONE AND ACETAMINOPHEN 5; 325 MG/1; MG/1
1 TABLET ORAL EVERY 6 HOURS
Refills: 0 | Status: DISCONTINUED | OUTPATIENT
Start: 2020-11-06 | End: 2020-11-06

## 2020-11-06 RX ORDER — MEPERIDINE HYDROCHLORIDE 50 MG/ML
12.5 INJECTION INTRAMUSCULAR; INTRAVENOUS; SUBCUTANEOUS
Refills: 0 | Status: DISCONTINUED | OUTPATIENT
Start: 2020-11-06 | End: 2020-11-06

## 2020-11-06 RX ORDER — ONDANSETRON 8 MG/1
4 TABLET, FILM COATED ORAL ONCE
Refills: 0 | Status: DISCONTINUED | OUTPATIENT
Start: 2020-11-06 | End: 2020-11-06

## 2020-11-06 RX ORDER — OXYCODONE HYDROCHLORIDE 5 MG/1
5 TABLET ORAL ONCE
Refills: 0 | Status: DISCONTINUED | OUTPATIENT
Start: 2020-11-06 | End: 2020-11-06

## 2020-11-06 RX ORDER — HYDROMORPHONE HYDROCHLORIDE 2 MG/ML
0.5 INJECTION INTRAMUSCULAR; INTRAVENOUS; SUBCUTANEOUS
Refills: 0 | Status: DISCONTINUED | OUTPATIENT
Start: 2020-11-06 | End: 2020-11-06

## 2020-11-06 RX ORDER — DOCUSATE SODIUM 100 MG
0 CAPSULE ORAL
Qty: 0 | Refills: 0 | DISCHARGE

## 2020-11-06 RX ORDER — FENTANYL CITRATE 50 UG/ML
50 INJECTION INTRAVENOUS
Refills: 0 | Status: DISCONTINUED | OUTPATIENT
Start: 2020-11-06 | End: 2020-11-06

## 2020-11-06 RX ORDER — SODIUM CHLORIDE 9 MG/ML
3 INJECTION INTRAMUSCULAR; INTRAVENOUS; SUBCUTANEOUS EVERY 8 HOURS
Refills: 0 | Status: DISCONTINUED | OUTPATIENT
Start: 2020-11-06 | End: 2020-11-06

## 2020-11-06 RX ORDER — FAMOTIDINE 10 MG/ML
20 INJECTION INTRAVENOUS ONCE
Refills: 0 | Status: COMPLETED | OUTPATIENT
Start: 2020-11-06 | End: 2020-11-06

## 2020-11-06 RX ORDER — OXYCODONE AND ACETAMINOPHEN 5; 325 MG/1; MG/1
2 TABLET ORAL EVERY 4 HOURS
Refills: 0 | Status: DISCONTINUED | OUTPATIENT
Start: 2020-11-06 | End: 2020-11-06

## 2020-11-06 RX ORDER — METOCLOPRAMIDE HCL 10 MG
10 TABLET ORAL ONCE
Refills: 0 | Status: COMPLETED | OUTPATIENT
Start: 2020-11-06 | End: 2020-11-06

## 2020-11-06 RX ORDER — SODIUM CHLORIDE 9 MG/ML
1000 INJECTION, SOLUTION INTRAVENOUS
Refills: 0 | Status: DISCONTINUED | OUTPATIENT
Start: 2020-11-06 | End: 2020-11-06

## 2020-11-06 RX ADMIN — FAMOTIDINE 20 MILLIGRAM(S): 10 INJECTION INTRAVENOUS at 12:21

## 2020-11-06 RX ADMIN — OXYCODONE HYDROCHLORIDE 5 MILLIGRAM(S): 5 TABLET ORAL at 16:03

## 2020-11-06 RX ADMIN — Medication 10 MILLIGRAM(S): at 12:21

## 2020-11-06 NOTE — BRIEF OPERATIVE NOTE - NSICDXBRIEFPROCEDURE_GEN_ALL_CORE_FT
PROCEDURES:  Lumpectomy of right breast 06-Nov-2020 15:53:56  Jazlyn Herzog  
PROCEDURES:  Reconstruction, breast, using oncoplastic technique 06-Nov-2020 16:00:19  Jazlyn Herzog

## 2020-11-06 NOTE — BRIEF OPERATIVE NOTE - NSICDXBRIEFPREOP_GEN_ALL_CORE_FT
PRE-OP DIAGNOSIS:  Recurrent breast cancer, right 06-Nov-2020 15:55:14  Jazlyn Herzog  
PRE-OP DIAGNOSIS:  Recurrent breast cancer, right 06-Nov-2020 15:55:14  Jazlyn Herzog

## 2020-11-06 NOTE — ASU PATIENT PROFILE, ADULT - PSH
H/O bilateral mastectomy  With BUNNY flap 2018  History of  section  1994  History of hysterectomy  2013  History of oral surgery    Port-a-cath in place  chemoport left chest wall 2017  Status post endometrial ablation  2013  Vascular port complication, initial encounter  Removed

## 2020-11-06 NOTE — ASU DISCHARGE PLAN (ADULT/PEDIATRIC) - CARE PROVIDER_API CALL
Maria DeJ esus Gutierrez  SURGERY  [address] [place] [id number]  Phone: [phone]  Fax: [phone]  Follow Up Time: 2 weeks

## 2020-11-12 DIAGNOSIS — C50.911 MALIGNANT NEOPLASM OF UNSPECIFIED SITE OF RIGHT FEMALE BREAST: ICD-10-CM

## 2020-11-12 DIAGNOSIS — N60.31 FIBROSCLEROSIS OF RIGHT BREAST: ICD-10-CM

## 2020-11-12 DIAGNOSIS — Z90.13 ACQUIRED ABSENCE OF BILATERAL BREASTS AND NIPPLES: ICD-10-CM

## 2020-11-12 DIAGNOSIS — Z85.3 PERSONAL HISTORY OF MALIGNANT NEOPLASM OF BREAST: ICD-10-CM

## 2020-11-12 DIAGNOSIS — Z90.710 ACQUIRED ABSENCE OF BOTH CERVIX AND UTERUS: ICD-10-CM

## 2020-11-12 DIAGNOSIS — N64.1 FAT NECROSIS OF BREAST: ICD-10-CM

## 2020-11-12 DIAGNOSIS — Z92.21 PERSONAL HISTORY OF ANTINEOPLASTIC CHEMOTHERAPY: ICD-10-CM

## 2020-11-12 DIAGNOSIS — I10 ESSENTIAL (PRIMARY) HYPERTENSION: ICD-10-CM

## 2020-11-12 NOTE — H&P PST ADULT - BREASTS
Felt to be steroid-induced.  Continue to follow urinalysis and chest x-ray as appropriate.   detailed exam

## 2020-11-14 NOTE — H&P PST ADULT - SPIRITUAL CULTURAL, RELIGIOUS PRACTICES/VALUES, PROFILE
Wound Care team at bedside for routine dressing change and ongoing monitoring of left lateral leg wound. Wound vac dressing changed according to current orders, no changes at this time. Will continue to follow while inpatient. Writer has taken care of this patient today during their wound care visit with the assistance of MICHELLE Oconnor. [employer]

## 2020-11-24 ENCOUNTER — APPOINTMENT (OUTPATIENT)
Dept: SURGERY | Facility: CLINIC | Age: 60
End: 2020-11-24
Payer: COMMERCIAL

## 2020-11-24 VITALS
SYSTOLIC BLOOD PRESSURE: 183 MMHG | WEIGHT: 187 LBS | HEIGHT: 62 IN | DIASTOLIC BLOOD PRESSURE: 114 MMHG | HEART RATE: 84 BPM | BODY MASS INDEX: 34.41 KG/M2

## 2020-11-24 PROCEDURE — 99024 POSTOP FOLLOW-UP VISIT: CPT

## 2020-11-24 NOTE — HISTORY OF PRESENT ILLNESS
[FreeTextEntry1] : I had the pleasure of seeing Monisha Young in the office today s/p reexcision of right mastectomy flap 11/6/2020.  \par \par Monisha is a diana 59 yo postmenopausal female. She is s/p bilateral mastectomy with right SLNB for [id number] positive cancer after receiving neoadjuvant chemotherapy 3/14/2018.\par \par She underwent right breast US on 10/20/2020 after palpable a right breast lump.  US demonstrated a 3.5 cm mass in the right breast 4:00 9 cmfn with recommendation of biopsy.  Biopsy was performed on 10/26/20 and demonstrated right breast IDC grade 3 ER 0% OK 0% Her2 positive.  CT C/A/P was preformed and demonstrated the 3.1 cm mass and borderline right axilla lymph node.  She was then sent for right axilla US and US came back with benign lymph node.  She went to the OR on 11/6/2020 of right breast reexcision of mastectomy.  Final pathology demonstrated 3.8 cm with involvement of dermal skin and retraction.  Lymphovascular invasion present.  ER 0% OK 0% Her2 positive.\par \par Genetics: color: Negative\par \par We discussed final pathology.  \par 3/14/2018 [facility]\par Left breast, mastectomy\par -Fibrocystic changes\par Right sentinel lymph node, excision\par -Three negative lymph nodes 0/3\par Right sentinel lymph node 2, excision \par -One negative lymph node 0/1\par Right breast, mastectomy\par -No residual invasive carcinoma s/p neoadjuvant treatment\par No residual DCIS s/p neoadjuvant treatment\par Biopsy site changes\par Radial scar\par Proliferative fibrocystic changes\par Pathologic staging: ypT0, ypNo\par \par 10/20/2020  US breast complete RT\par Impression:  No suspicious mass at the area of the lump/  Ultrasound guided biopsy recommended.\par \par 10/26/2020  Pathology\par breast right 4 o'clcok, 9 cmfn, ultrasound guided core biopsy:  Invasive poorly differentiated ductal carcinoma with focal necrosis and foci with micropapillary features.  Enzo score 8/9.  Invasive tumor measures at least 1.5 cm.  ER 0% OK 0 % Her2 negative.\par \par 11/2/2020  CT Chest/Abdomen/Pelvis\par IMPRESSION:Right breast mass corresponding to known carcinoma recurrence.\par Borderline right axillary lymph node.No metastatic lesion otherwise visualized.\par \par 11/2/2020  NM Bone IMG Whole Body\par Impression:  No radionuclide evidence of osseous metastases.\par \par 11/3/2020  US breast limited RT\par Impression:  Benign appearing right axillary lymph nodes.  Recommend surgical/oncologic management.  BIRADS 2 Benign findings.\par \par 1.  tissue (right breast mastectomy flap, medial magin):  Negative for carcinoma.\par 2.  Tissue (rightmastectomy flap):  Infiltrating ductal carcinoma, poorly differentiated with necrosis, involvement of dermal skin and retraction artifact, measuring 3.8 x 3.1 x 3.1 cm (see comment).  Chronic inflammation, foreign body giant cell reaction, organizing fat necrosis and fibrosis consitent with surgical changes.\par 3.  Tissue (right breast mastectomy flap, superior scar tissue):  Lymphovascular invasion present.  Patchy chronic inflammation, foreign body giant cell reaction, organizing fat necrosis and fibrosis consistent with surgical changes.\par 4.  Tissue (right breast mastectomy flap, medial margin):  Negative for carcinoma.\par 5.  Tissue ( right breast mastectomy flap, superior margin):  Negative for carcinoma.\par 6.  Tissue (right breast mastectomy flap, lateral margin):  Negative for carcinoma.\par 7.  Tissue (right breast mastectomy flap, inferior margin):  Negative for carcinoma.\par 8.  Tissue (right breast mastectomy flap, deep posterior margin):  Negative for carcinoma.\par 9.  Tissue ( right breast mastectomy, additional medial skin margin):  Negative for carcinoma\par \par She is s/p right mastectomy flap excision/chest excision. Final pathology demonstrated 3.8 cm with involvement of dermal skin and retraction.  Lymphovascular invasion present.  ER 0% OK 0% Her2 positive.  Recommendation for follow up with radiation oncology, medical oncology, and follow up in 8 weeks.   All questions were answered.\par

## 2020-11-24 NOTE — PHYSICAL EXAM
[Normocephalic] : normocephalic [Atraumatic] : atraumatic [PERRL] : pupils equal, round and reactive to light [Sclera nonicteric] : sclera nonicteric [Supple] : supple [No Supraclavicular Adenopathy] : no supraclavicular adenopathy [Examined in the supine and seated position] : examined in the supine and seated position [No dominant masses] : no dominant masses in right breast  [No dominant masses] : no dominant masses left breast [No Nipple Retraction] : no left nipple retraction [No Nipple Discharge] : no left nipple discharge [No Axillary Lymphadenopathy] : no left axillary lymphadenopathy [No Edema] : no edema [No Rashes] : no rashes [No Ulceration] : no ulceration [de-identified] : Surgical site is healing well. [de-identified] : BUNNY flap viable well healed.

## 2020-11-24 NOTE — ASSESSMENT
[FreeTextEntry1] : 61 yo presents for followup after recent right breast biopsy demonstrated recurrence of right breast IDC grade 3 ER 0% WA 0% Her2 positive (3.5 cm).  She is s/p right mastectomy flap excision/chest excision. Final pathology demonstrated 3.8 cm with involvement of dermal skin and retraction.  Lymphovascular invasion present.  ER 0% WA 0% Her2 positive.  Recommendation for follow up with radiation oncology, medical oncology, and follow up in 8 weeks.\par 1. Follow up with Dr. Reddy after surgery\par 2. Follow up with Dr. Burleson\par 3. Consult with radiation oncologist\par 4.  Follow up in 8 weeks

## 2020-11-27 NOTE — ASU PATIENT PROFILE, ADULT - NSALCOHOLAMT_GEN_A_CORE_SD
Spoke with sister/JAME Pascual.  She was calling for clarification as her other sister got a call/message speaking of an upcoming surgery.  As of now, nothing is scheduled except a follow up appointment with orthopedics.     1-2 drinks

## 2020-12-04 ENCOUNTER — OUTPATIENT (OUTPATIENT)
Dept: OUTPATIENT SERVICES | Facility: HOSPITAL | Age: 60
LOS: 1 days | Discharge: HOME | End: 2020-12-04

## 2020-12-04 DIAGNOSIS — Z95.828 PRESENCE OF OTHER VASCULAR IMPLANTS AND GRAFTS: Chronic | ICD-10-CM

## 2020-12-04 DIAGNOSIS — Z90.13 ACQUIRED ABSENCE OF BILATERAL BREASTS AND NIPPLES: Chronic | ICD-10-CM

## 2020-12-04 DIAGNOSIS — T82.9XXA UNSPECIFIED COMPLICATION OF CARDIAC AND VASCULAR PROSTHETIC DEVICE, IMPLANT AND GRAFT, INITIAL ENCOUNTER: Chronic | ICD-10-CM

## 2020-12-04 DIAGNOSIS — Z98.890 OTHER SPECIFIED POSTPROCEDURAL STATES: Chronic | ICD-10-CM

## 2020-12-04 DIAGNOSIS — Z98.891 HISTORY OF UTERINE SCAR FROM PREVIOUS SURGERY: Chronic | ICD-10-CM

## 2020-12-04 DIAGNOSIS — Z90.710 ACQUIRED ABSENCE OF BOTH CERVIX AND UTERUS: Chronic | ICD-10-CM

## 2020-12-04 DIAGNOSIS — C50.919 MALIGNANT NEOPLASM OF UNSPECIFIED SITE OF UNSPECIFIED FEMALE BREAST: ICD-10-CM

## 2020-12-07 ENCOUNTER — OUTPATIENT (OUTPATIENT)
Dept: OUTPATIENT SERVICES | Facility: HOSPITAL | Age: 60
LOS: 1 days | Discharge: HOME | End: 2020-12-07
Payer: COMMERCIAL

## 2020-12-07 DIAGNOSIS — Z95.828 PRESENCE OF OTHER VASCULAR IMPLANTS AND GRAFTS: Chronic | ICD-10-CM

## 2020-12-07 DIAGNOSIS — Z98.890 OTHER SPECIFIED POSTPROCEDURAL STATES: Chronic | ICD-10-CM

## 2020-12-07 DIAGNOSIS — T82.9XXA UNSPECIFIED COMPLICATION OF CARDIAC AND VASCULAR PROSTHETIC DEVICE, IMPLANT AND GRAFT, INITIAL ENCOUNTER: Chronic | ICD-10-CM

## 2020-12-07 DIAGNOSIS — Z90.13 ACQUIRED ABSENCE OF BILATERAL BREASTS AND NIPPLES: Chronic | ICD-10-CM

## 2020-12-07 DIAGNOSIS — Z98.891 HISTORY OF UTERINE SCAR FROM PREVIOUS SURGERY: Chronic | ICD-10-CM

## 2020-12-07 DIAGNOSIS — Z90.710 ACQUIRED ABSENCE OF BOTH CERVIX AND UTERUS: Chronic | ICD-10-CM

## 2020-12-08 ENCOUNTER — RESULT REVIEW (OUTPATIENT)
Age: 60
End: 2020-12-08

## 2020-12-08 ENCOUNTER — APPOINTMENT (OUTPATIENT)
Dept: HEMATOLOGY ONCOLOGY | Facility: CLINIC | Age: 60
End: 2020-12-08
Payer: COMMERCIAL

## 2020-12-08 ENCOUNTER — APPOINTMENT (OUTPATIENT)
Dept: RADIATION ONCOLOGY | Facility: CLINIC | Age: 60
End: 2020-12-08
Payer: COMMERCIAL

## 2020-12-08 VITALS — BODY MASS INDEX: 35.52 KG/M2 | WEIGHT: 193.01 LBS | HEIGHT: 62 IN

## 2020-12-08 VITALS
HEIGHT: 62 IN | BODY MASS INDEX: 33.49 KG/M2 | WEIGHT: 182 LBS | SYSTOLIC BLOOD PRESSURE: 150 MMHG | DIASTOLIC BLOOD PRESSURE: 90 MMHG | RESPIRATION RATE: 16 BRPM | OXYGEN SATURATION: 100 % | HEART RATE: 83 BPM | TEMPERATURE: 98 F

## 2020-12-08 LAB
BASOPHILS # BLD AUTO: 0.1 K/UL — SIGNIFICANT CHANGE UP (ref 0–0.2)
BASOPHILS NFR BLD AUTO: 1 % — SIGNIFICANT CHANGE UP (ref 0–2)
EOSINOPHIL # BLD AUTO: 0.3 K/UL — SIGNIFICANT CHANGE UP (ref 0–0.5)
EOSINOPHIL NFR BLD AUTO: 4.6 % — SIGNIFICANT CHANGE UP (ref 0–6)
HCT VFR BLD CALC: 43.6 % — SIGNIFICANT CHANGE UP (ref 34.5–45)
HGB BLD-MCNC: 14.5 G/DL — SIGNIFICANT CHANGE UP (ref 11.5–15.5)
LYMPHOCYTES # BLD AUTO: 1.5 K/UL — SIGNIFICANT CHANGE UP (ref 1–3.3)
LYMPHOCYTES # BLD AUTO: 24.1 % — SIGNIFICANT CHANGE UP (ref 13–44)
MCHC RBC-ENTMCNC: 28.6 PG — SIGNIFICANT CHANGE UP (ref 27–34)
MCHC RBC-ENTMCNC: 33.3 G/DL — SIGNIFICANT CHANGE UP (ref 32–36)
MCV RBC AUTO: 86.1 FL — SIGNIFICANT CHANGE UP (ref 80–100)
MONOCYTES # BLD AUTO: 0.4 K/UL — SIGNIFICANT CHANGE UP (ref 0–0.9)
MONOCYTES NFR BLD AUTO: 6 % — SIGNIFICANT CHANGE UP (ref 2–14)
NEUTROPHILS # BLD AUTO: 4 K/UL — SIGNIFICANT CHANGE UP (ref 1.8–7.4)
NEUTROPHILS NFR BLD AUTO: 64.3 % — SIGNIFICANT CHANGE UP (ref 43–77)
PLATELET # BLD AUTO: 228 K/UL — SIGNIFICANT CHANGE UP (ref 150–400)
RBC # BLD: 5.06 M/UL — SIGNIFICANT CHANGE UP (ref 3.8–5.2)
RBC # FLD: 11.3 % — SIGNIFICANT CHANGE UP (ref 10.3–14.5)
WBC # BLD: 6.3 K/UL — SIGNIFICANT CHANGE UP (ref 3.8–10.5)
WBC # FLD AUTO: 6.3 K/UL — SIGNIFICANT CHANGE UP (ref 3.8–10.5)

## 2020-12-08 PROCEDURE — 77263 THER RADIOLOGY TX PLNG CPLX: CPT

## 2020-12-08 PROCEDURE — 99072 ADDL SUPL MATRL&STAF TM PHE: CPT

## 2020-12-08 PROCEDURE — 99214 OFFICE O/P EST MOD 30 MIN: CPT

## 2020-12-08 PROCEDURE — 99205 OFFICE O/P NEW HI 60 MIN: CPT | Mod: 25

## 2020-12-08 NOTE — VITALS
[Maximal Pain Intensity: 1/10] : 1/10 [Least Pain Intensity: 1/10] : 1/10 [Pain Description/Quality: ___] : Pain description/quality: [unfilled] [Pain Duration: ___] : Pain duration: [unfilled] [Pain Location: ___] : Pain Location: [unfilled] [NoTreatment Scheduled] : no treatment scheduled [80: Normal activity with effort; some signs or symptoms of disease.] : 80: Normal activity with effort; some signs or symptoms of disease.  [Date: ____________] : Patient's last distress assessment performed on [unfilled]. [7 - Distress Level] : Distress Level: 7 [Referred Patient  to social work for follow-up] : Patient was referred to social work for follow-up [Pain Interferes with ADLs] : Pain does not interfere with activities of daily living

## 2020-12-08 NOTE — LETTER CLOSING
[Consult Closing:] : Thank you for allowing me to participate in the care of this patient.  If you have any questions, please do not hesitate to contact me. [Sincerely yours,] : Sincerely yours, [FreeTextEntry3] : Keith Celeste MD\par Attending Physician\par Department of Radiation Medicine\par [facility], [facility]\par \par \par [facility]\par

## 2020-12-08 NOTE — REASON FOR VISIT
[Breast Cancer] : breast cancer [Consideration of Curative Therapy] : consideration of curative therapy for [Spouse] : spouse

## 2020-12-08 NOTE — REVIEW OF SYSTEMS
[Patient Intake Form Reviewed] : Patient intake form was reviewed [Negative] : Allergic/Immunologic [Edema Limbs: Grade 0] : Edema Limbs: Grade 0  [Fatigue: Grade 0] : Fatigue: Grade 0 [Localized Edema: Grade 0] : Localized Edema: Grade 0  [Breast Pain: Grade 1 - Mild pain] : Breast Pain: Grade 1 - Mild pain [Neck Edema: Grade 0] : Neck Edema: Grade 0

## 2020-12-08 NOTE — HISTORY OF PRESENT ILLNESS
[FreeTextEntry1] : 60 year old woman presents today for consultation regarding recurrent breast carcinoma.\par \par She was initially diagnosed in 8/2017 with invasive right breast cancer.  Breast imaging and biopsy at the time showed a 3.8 cm area of MR non-mass enhancement in the right breast 3-4:00, with pathology showing poorly-differentiated invasive ductal carcinoma, ER -, VT -, HER2 IHC 3+ (positive).  cT2 N0.\par \par She underwent neoadjuvant TCHP with Dr. Jaycee Reddy beginning on 9/20/17, and completed 6 cycles on 1/3/18.\par \par She underwent right skin-sparing mastectomy, right SLNBx, and left risk-reduction mastectomy with BUNNY reconstruction on 3/13/18 by Armando Gutierrez and Neymar Burleson.  Pathology showed no residual invasive carcinoma, and no residual DCIS.  A total of 4 right sentinel lymph nodes were excised, and negative (total 0/4).  ypT0 ypN0.\par \par She completed 1 year of Herceptin on 11/26/18.\par \par She palpated a right breast lump in 10/2020, and underwent right breast ultrasound on 10/20/20 which showed in the area of palpable concern a 3.5 x 2.7 x 2.7 cm irregular heterogeneous mass in the right 4:00, 9 CM FN.  BI-RADS 4B.\par \par She underwent ultrasound-guided core biopsy on 10/20/2020, with pathology showing poorly differentiated invasive ductal carcinoma with focal necrosis, Tyler 8/9, measuring at least 1.5 CM in the biopsy specimen.  ER 0%, VT 0%, HER2 3+ (positive).\par \par 11/2/2020 CT Chest/Abdomen/Pelvis showed a right breast mass corresponding to known carcinoma recurrence, with a borderline right axillary lymph node.  No metastatic lesions were otherwise visualized.\par \[facility] 11/2/2020 NM bone scan showed no radionuclide evidence of osseous metastases.\par \[facility] 11/3/2020 targeted ultrasound of the right axilla showed benign appearing right axillary lymph nodes measuring 1.6 CM and 1.3 CM.  BI-RADS 2.\par \par She underwent right mastectomy flap excision/chest excision on 11/6/2020.  Pathology showed infiltrating ductal carcinoma, poorly differentiated with necrosis, involvement of dermal skin and retraction artifact, measuring 3.8 x 3.1 x 3.1 CM.  Lymphovascular invasion was present.\par \par She is scheduled to follow up with Dr. Reddy later today.\par \par Post-operatively, she notes some soreness of the right reconstruction but denies pruritus or other bother.  Denies breast edema, appreciable lymphedema, fatigue or recent unintentional weight loss.

## 2020-12-08 NOTE — DISEASE MANAGEMENT
[Pathological] : TNM Stage: p [FreeTextEntry4] : recurrent, IDC grade 3, ER-/MT-, HER2+ [TTNM] : 2 [NTNM] : 0 [MTNM] : 0 [IIA] : IIA

## 2020-12-08 NOTE — PHYSICAL EXAM
[Extraocular Movements] : extraocular movements were intact [Outer Ear] : the ears and nose were normal in appearance [No UE Edema] : there is no upper extremity edema [Normal] : no focal deficits [de-identified] : Examination performed in the presence of a female chaperone.  Petechiae along medial aspect of incision.

## 2020-12-16 ENCOUNTER — NON-APPOINTMENT (OUTPATIENT)
Age: 60
End: 2020-12-16

## 2020-12-18 LAB
ALBUMIN SERPL ELPH-MCNC: 4.7 G/DL
ALP BLD-CCNC: 112 U/L
ALT SERPL-CCNC: 15 U/L
ANION GAP SERPL CALC-SCNC: 12 MMOL/L
AST SERPL-CCNC: 23 U/L
BILIRUB SERPL-MCNC: 0.6 MG/DL
BUN SERPL-MCNC: 15 MG/DL
CALCIUM SERPL-MCNC: 9.8 MG/DL
CHLORIDE SERPL-SCNC: 102 MMOL/L
CO2 SERPL-SCNC: 25 MMOL/L
CREAT SERPL-MCNC: 0.72 MG/DL
HBV CORE IGM SER QL: NONREACTIVE
HBV SURFACE AB SER QL: NONREACTIVE
HBV SURFACE AB SERPL IA-ACNC: <3 MIU/ML
HBV SURFACE AG SER QL: NONREACTIVE
HCV AB SER QL: NONREACTIVE
HCV S/CO RATIO: 0.14 S/CO
MAGNESIUM SERPL-MCNC: 1.8 MG/DL
POTASSIUM SERPL-SCNC: 3.8 MMOL/L
PROT SERPL-MCNC: 7 G/DL
SODIUM SERPL-SCNC: 140 MMOL/L

## 2020-12-18 NOTE — HISTORY OF PRESENT ILLNESS
[Disease: _____________________] : Disease: [unfilled] [T: ___] : T[unfilled] [de-identified] : Ms. Young was diagnosed with HER2 positive right breast cancer at age 56 in August 2017.\par \par She initially presented with right nipple yellowish discharge.  Subsequent mammogram and ultrasound on 7/27/17 showed new spiculated mass in inner central posterior right breast with sonographic confirmation of suspicious mass at 3-4:00 region, and an indeterminate hypoechoic nodule at 1:00 right breast.\par \par On 8/3/17 she had right breast core biopsy of 3-4:00 mass --> pathology showed invasive poorly differentiated ductal carcinoma, Enzo grade 8/9, 0.9 cm--> ER-, VA-, HER2 IHC 3+ (positive)\par Right breast 1:00 --> fibrocystic change, proliferative type with nodular sclerosing adenosis\par \par 8/21/17 MRI breast -   Biopsy  clip  and  3.8  cm  area  of  irregular  non  mass  enhancement  in  the right  breast  at  the  3-4:00  location  posteriorly,  corresponding  to  recently\par diagnosed  invasive  carcinoma. Additional  irregular  areas  of  enhancement  in  the  right  breast  at  the 2-3:00  location  and  8-9:00  location,  suspicious  for  additional disease. Representative  MR  guided  core  biopsy  of  enhancement  at  the  8-9:00  location can  be  performed  to  evaluate  for  multicentric  disease,  if  it  would  change\par management. 8  mm  enhancing  nodule  demonstrating  washout  kinetics  in  the  upper slightly  inner  mid  left  breast.  MR  guided  core  biopsy  is  advised.\par Clip  artifact  in  the  right  breast  at  the  1:00  location  marking  site  of biopsy-proven  radial  scar.  No  associated  suspicious  enhancement.  Recommend\par appropriate  surgical  management.  2.1  cm  oval  T2  bright  lesion  in  the  liver,  probable  cyst  or  hemangioma.\par Recommend  dedicated  liver  ultrasound  for  further  evaluation.\par \par 8/28/17 MRI Core biopsy right breast lower outer quadrant --> DCIS, solid pattern, high grade atypia, focal necrosis. ER/VA pending\par Core biopsy left breast central inner --> mild proliferative fibrocystic change\par \par Treatment Summary\par 9/21/17 port placement\par 9/20/17 - 1/3/18  TCHP x 6 cycles (9/30/17 - 1/03/18\par 5/03/18 - 11/26/18  Trastuzumab (Herceptin) X every 3 weeks,  to complete 52 weeks.\par She had breast reconstruction on 8/14/18 with nipple reconstruction.\par S/p port removal on 6/19/19.\par \par 3/14/18 - She underwent right skin sparing mastectomy with SNL biopsy and left risk reduction mastectomy with BUNNY reconstruction.\par 1- Left breast, mastectomy- Fibrocystic changes\par 2- Right sentinel lymph node, excision- Three negative lymph nodes (0/3)\par 3- Right sentinel lymph node 2, excision- One negative lymph node (0/1)\par 4- Right breast, mastectomy- No residual invasive carcinoma (s/p neoadjuvant treatment)- No residual DCIS (s/p neoadjuvant treatment)\par  Pathologic staging: ypT0, ypN0\par \par  [de-identified] : HER2 positive [de-identified] : Patient returns for follow up. \par She had a R breast US on 10/20/20 for palpable R breast lump which showed 3.5 cm mass at 4:00, 9 cmfn. \par 10/26/20 US guided biopsy R breast 4:00 --Breast, right, 4 o'clock, 9 cm FN, ultrasound guided core biopsy\par - Invasive poorly differentiated ductal carcinoma withfocal necrosis and foci with micropapillary features\par - Henderson score 8/9 (3 + 3+ 2 )\par - Invasive tumor measures at least 1.5 cm\par -ER 0%, UT 0%, HER2 3+\par \par On 11/6/20 she underwent right mastectomy flap excision - IDC, poorly differentiated with necrosis, involvement of dermal skin and retraction artifact, 3.8 x 3.1 x 3.1\par -ER 0%, UT 0%, HER2 3+

## 2020-12-18 NOTE — PHYSICAL EXAM
[Restricted in physically strenuous activity but ambulatory and able to carry out work of a light or sedentary nature] : Status 1- Restricted in physically strenuous activity but ambulatory and able to carry out work of a light or sedentary nature, e.g., light house work, office work [Normal] : affect appropriate [de-identified] : no edema [de-identified] : R flap excision scar

## 2020-12-18 NOTE — ASSESSMENT
[FreeTextEntry1] : 58 year old postmenopausal female with stage IIA (T2) ER-/CT-/HER2 positive right breast cancer.  S/p 6 cycles of neoadjuvant TCHP followed by bilateral mastectomy + BUNNY on 3/14/18, ypT0 ypN0 (pathologic complete response). Completed 1 year of Herceptin on 11/26/18. \par She developed R mastectomy flap recurrence in 10/2020, s/p excision on 11/6/20 -> 3.8 cm poorly differentiated IDC, ER-/CT-/HER2 positive. \par \par Reviewed final pathology.  Discussed role of RT and chemotherapy/HER2 directed therapy.\par Will discuss her case at weekly breast tumor board for sequence of RT and systemic treatment given this was local failure.  Discussed use of THP x 6 cycles and then 1 year of HP. \par \par Plan: \par ECHO now\par Will need port eventually\par Will finalize plan of care after tumor board. \par

## 2020-12-21 ENCOUNTER — APPOINTMENT (OUTPATIENT)
Dept: CARDIOLOGY | Facility: CLINIC | Age: 60
End: 2020-12-21
Payer: COMMERCIAL

## 2020-12-21 PROCEDURE — 99072 ADDL SUPL MATRL&STAF TM PHE: CPT

## 2020-12-21 PROCEDURE — 93306 TTE W/DOPPLER COMPLETE: CPT

## 2020-12-21 PROCEDURE — 93356 MYOCRD STRAIN IMG SPCKL TRCK: CPT

## 2020-12-22 PROCEDURE — 77334 RADIATION TREATMENT AID(S): CPT | Mod: 26

## 2020-12-22 PROCEDURE — 77290 THER RAD SIMULAJ FIELD CPLX: CPT | Mod: 26

## 2020-12-28 ENCOUNTER — NON-APPOINTMENT (OUTPATIENT)
Age: 60
End: 2020-12-28

## 2021-01-05 PROCEDURE — 77338 DESIGN MLC DEVICE FOR IMRT: CPT | Mod: 26

## 2021-01-05 PROCEDURE — 77300 RADIATION THERAPY DOSE PLAN: CPT | Mod: 26

## 2021-01-05 PROCEDURE — 77301 RADIOTHERAPY DOSE PLAN IMRT: CPT | Mod: 26

## 2021-01-06 ENCOUNTER — OUTPATIENT (OUTPATIENT)
Dept: OUTPATIENT SERVICES | Facility: HOSPITAL | Age: 61
LOS: 1 days | Discharge: HOME | End: 2021-01-06

## 2021-01-06 ENCOUNTER — APPOINTMENT (OUTPATIENT)
Dept: BREAST CENTER | Facility: CLINIC | Age: 61
End: 2021-01-06
Payer: COMMERCIAL

## 2021-01-06 DIAGNOSIS — C50.919 MALIGNANT NEOPLASM OF UNSPECIFIED SITE OF UNSPECIFIED FEMALE BREAST: ICD-10-CM

## 2021-01-06 DIAGNOSIS — Z90.13 ACQUIRED ABSENCE OF BILATERAL BREASTS AND NIPPLES: Chronic | ICD-10-CM

## 2021-01-06 DIAGNOSIS — T82.9XXA UNSPECIFIED COMPLICATION OF CARDIAC AND VASCULAR PROSTHETIC DEVICE, IMPLANT AND GRAFT, INITIAL ENCOUNTER: Chronic | ICD-10-CM

## 2021-01-06 DIAGNOSIS — Z98.890 OTHER SPECIFIED POSTPROCEDURAL STATES: Chronic | ICD-10-CM

## 2021-01-06 DIAGNOSIS — Z98.891 HISTORY OF UTERINE SCAR FROM PREVIOUS SURGERY: Chronic | ICD-10-CM

## 2021-01-06 DIAGNOSIS — Z90.710 ACQUIRED ABSENCE OF BOTH CERVIX AND UTERUS: Chronic | ICD-10-CM

## 2021-01-06 DIAGNOSIS — Z95.828 PRESENCE OF OTHER VASCULAR IMPLANTS AND GRAFTS: Chronic | ICD-10-CM

## 2021-01-06 DIAGNOSIS — B02.9 ZOSTER W/OUT COMPLICATIONS: ICD-10-CM

## 2021-01-06 PROCEDURE — 99072 ADDL SUPL MATRL&STAF TM PHE: CPT

## 2021-01-06 PROCEDURE — 99213 OFFICE O/P EST LOW 20 MIN: CPT

## 2021-01-11 ENCOUNTER — APPOINTMENT (OUTPATIENT)
Age: 61
End: 2021-01-11

## 2021-01-11 ENCOUNTER — RESULT REVIEW (OUTPATIENT)
Age: 61
End: 2021-01-11

## 2021-01-11 ENCOUNTER — NON-APPOINTMENT (OUTPATIENT)
Age: 61
End: 2021-01-11

## 2021-01-11 LAB
BASOPHILS # BLD AUTO: 0.1 K/UL — SIGNIFICANT CHANGE UP (ref 0–0.2)
BASOPHILS NFR BLD AUTO: 1 % — SIGNIFICANT CHANGE UP (ref 0–2)
EOSINOPHIL # BLD AUTO: 0.3 K/UL — SIGNIFICANT CHANGE UP (ref 0–0.5)
EOSINOPHIL NFR BLD AUTO: 6.3 % — HIGH (ref 0–6)
HCT VFR BLD CALC: 41.4 % — SIGNIFICANT CHANGE UP (ref 34.5–45)
HGB BLD-MCNC: 14.3 G/DL — SIGNIFICANT CHANGE UP (ref 11.5–15.5)
LYMPHOCYTES # BLD AUTO: 1.2 K/UL — SIGNIFICANT CHANGE UP (ref 1–3.3)
LYMPHOCYTES # BLD AUTO: 22.4 % — SIGNIFICANT CHANGE UP (ref 13–44)
MCHC RBC-ENTMCNC: 28.5 PG — SIGNIFICANT CHANGE UP (ref 27–34)
MCHC RBC-ENTMCNC: 34.4 G/DL — SIGNIFICANT CHANGE UP (ref 32–36)
MCV RBC AUTO: 82.8 FL — SIGNIFICANT CHANGE UP (ref 80–100)
MONOCYTES # BLD AUTO: 0.3 K/UL — SIGNIFICANT CHANGE UP (ref 0–0.9)
MONOCYTES NFR BLD AUTO: 6.4 % — SIGNIFICANT CHANGE UP (ref 2–14)
NEUTROPHILS # BLD AUTO: 3.3 K/UL — SIGNIFICANT CHANGE UP (ref 1.8–7.4)
NEUTROPHILS NFR BLD AUTO: 63.8 % — SIGNIFICANT CHANGE UP (ref 43–77)
PLATELET # BLD AUTO: 209 K/UL — SIGNIFICANT CHANGE UP (ref 150–400)
RBC # BLD: 5 M/UL — SIGNIFICANT CHANGE UP (ref 3.8–5.2)
RBC # FLD: 11 % — SIGNIFICANT CHANGE UP (ref 10.3–14.5)
WBC # BLD: 5.2 K/UL — SIGNIFICANT CHANGE UP (ref 3.8–10.5)
WBC # FLD AUTO: 5.2 K/UL — SIGNIFICANT CHANGE UP (ref 3.8–10.5)

## 2021-01-11 RX ORDER — ACETAMINOPHEN 500 MG
2 TABLET ORAL
Qty: 0 | Refills: 0 | DISCHARGE

## 2021-01-12 DIAGNOSIS — Z51.11 ENCOUNTER FOR ANTINEOPLASTIC CHEMOTHERAPY: ICD-10-CM

## 2021-01-13 ENCOUNTER — APPOINTMENT (OUTPATIENT)
Age: 61
End: 2021-01-13

## 2021-01-14 ENCOUNTER — NON-APPOINTMENT (OUTPATIENT)
Age: 61
End: 2021-01-14

## 2021-01-14 VITALS
DIASTOLIC BLOOD PRESSURE: 96 MMHG | WEIGHT: 191 LBS | SYSTOLIC BLOOD PRESSURE: 157 MMHG | TEMPERATURE: 98 F | OXYGEN SATURATION: 97 % | HEART RATE: 87 BPM | BODY MASS INDEX: 35.15 KG/M2 | RESPIRATION RATE: 16 BRPM | HEIGHT: 62 IN

## 2021-01-14 NOTE — PHYSICAL EXAM
[Normal] : oriented to person, place and time, the affect was normal, the mood was normal and not anxious [de-identified] : erythema along the right breast LIQ; vesicles mostly resolved and smaller; no crusting; patch of erythema along inframammary fold.

## 2021-01-14 NOTE — DISEASE MANAGEMENT
[Pathological] : TNM Stage: p [IIA] : IIA [FreeTextEntry4] : recurrent, IDC grade 3, ER-/ME-, HER2+ [TTNM] : 2 [NTNM] : 0 [MTNM] : 0 [de-identified] : [id number] [de-identified] : right breast

## 2021-01-19 ENCOUNTER — NON-APPOINTMENT (OUTPATIENT)
Age: 61
End: 2021-01-19

## 2021-01-19 VITALS
WEIGHT: 191 LBS | TEMPERATURE: 98 F | HEART RATE: 87 BPM | HEIGHT: 62 IN | DIASTOLIC BLOOD PRESSURE: 87 MMHG | RESPIRATION RATE: 16 BRPM | OXYGEN SATURATION: 98 % | BODY MASS INDEX: 35.15 KG/M2 | SYSTOLIC BLOOD PRESSURE: 149 MMHG

## 2021-01-20 NOTE — HISTORY OF PRESENT ILLNESS
[FreeTextEntry1] : I had the pleasure of seeing Monisha Young in the office today for an acute visit secondary to right breast rash along surgical site.\par \par Monisha is a diana 61 yo postmenopausal female. She is s/p bilateral mastectomy with right SLNB for Oke2cjk positive cancer after receiving neoadjuvant chemotherapy 3/14/2018.\par \par She underwent right breast US on 10/20/2020 after palpable a right breast lump.  US demonstrated a 3.5 cm mass in the right breast 4:00 9 cmfn with recommendation of biopsy.  Biopsy was performed on 10/26/20 and demonstrated right breast IDC grade 3 ER 0% MO 0% Her2 positive.  CT C/A/P was preformed and demonstrated the 3.1 cm mass and borderline right axilla lymph node.  She was then sent for right axilla US and US came back with benign lymph node.  She went to the OR on 11/6/2020 of right breast reexcision of mastectomy.  Final pathology demonstrated 3.8 cm with involvement of dermal skin and retraction.  Lymphovascular invasion present.  ER 0% MO 0% Her2 positive.\par \par Genetics: color: Negative\par \par We discussed final pathology.  \par 3/14/2018 [facility]\par Left breast, mastectomy\par -Fibrocystic changes\par Right sentinel lymph node, excision\par -Three negative lymph nodes 0/3\par Right sentinel lymph node 2, excision \par -One negative lymph node 0/1\par Right breast, mastectomy\par -No residual invasive carcinoma s/p neoadjuvant treatment\par No residual DCIS s/p neoadjuvant treatment\par Biopsy site changes\par Radial scar\par Proliferative fibrocystic changes\par Pathologic staging: ypT0, ypNo\par \par 10/20/2020  US breast complete RT\par Impression:  No suspicious mass at the area of the lump/  Ultrasound guided biopsy recommended.\par \par 10/26/2020  Pathology\par breast right 4 o'clcok, 9 cmfn, ultrasound guided core biopsy:  Invasive poorly differentiated ductal carcinoma with focal necrosis and foci with micropapillary features.  Enzo score 8/9.  Invasive tumor measures at least 1.5 cm.  ER 0% MO 0 % Her2 negative.\par \par 11/2/2020  CT Chest/Abdomen/Pelvis\par IMPRESSION:Right breast mass corresponding to known carcinoma recurrence.\par Borderline right axillary lymph node.No metastatic lesion otherwise visualized.\par \par 11/2/2020  NM Bone IMG Whole Body\par Impression:  No radionuclide evidence of osseous metastases.\par \par 11/3/2020  US breast limited RT\par Impression:  Benign appearing right axillary lymph nodes.  Recommend surgical/oncologic management.  BIRADS 2 Benign findings.\par \par 1.  tissue (right breast mastectomy flap, medial magin):  Negative for carcinoma.\par 2.  Tissue (rightmastectomy flap):  Infiltrating ductal carcinoma, poorly differentiated with necrosis, involvement of dermal skin and retraction artifact, measuring 3.8 x 3.1 x 3.1 cm (see comment).  Chronic inflammation, foreign body giant cell reaction, organizing fat necrosis and fibrosis consitent with surgical changes.\par 3.  Tissue (right breast mastectomy flap, superior scar tissue):  Lymphovascular invasion present.  Patchy chronic inflammation, foreign body giant cell reaction, organizing fat necrosis and fibrosis consistent with surgical changes.\par 4.  Tissue (right breast mastectomy flap, medial margin):  Negative for carcinoma.\par 5.  Tissue ( right breast mastectomy flap, superior margin):  Negative for carcinoma.\par 6.  Tissue (right breast mastectomy flap, lateral margin):  Negative for carcinoma.\par 7.  Tissue (right breast mastectomy flap, inferior margin):  Negative for carcinoma.\par 8.  Tissue (right breast mastectomy flap, deep posterior margin):  Negative for carcinoma.\par 9.  Tissue ( right breast mastectomy, additional medial skin margin):  Negative for carcinoma\par \par There is erythema with small vesicles along the right breast surgical site. She denies pain or itching.  This is appears to be shingles and will begin Valacyclovir. I will send email to Dr. Celeste since she is suppose to beginning radiation this week.  She will follow up in 2 weeks.  \par \par She understands and agrees with plan.  All questions answered.\par \par

## 2021-01-20 NOTE — PHYSICAL EXAM
[Normocephalic] : normocephalic [Atraumatic] : atraumatic [PERRL] : pupils equal, round and reactive to light [Sclera nonicteric] : sclera nonicteric [Supple] : supple [No Supraclavicular Adenopathy] : no supraclavicular adenopathy [Examined in the supine and seated position] : examined in the supine and seated position [No dominant masses] : no dominant masses in right breast  [No dominant masses] : no dominant masses left breast [No Nipple Retraction] : no left nipple retraction [No Nipple Discharge] : no left nipple discharge [No Axillary Lymphadenopathy] : no left axillary lymphadenopathy [No Edema] : no edema [No Rashes] : no rashes [No Ulceration] : no ulceration [de-identified] :  Erythema with small vesicles along surgical sites [de-identified] : BUNNY flap viable well healed.

## 2021-01-21 NOTE — PHYSICAL EXAM
[Normal] : oriented to person, place and time, the affect was normal, the mood was normal and not anxious [de-identified] : residual erythema along the right breast LIQ; vesicles resolved.

## 2021-01-21 NOTE — DISEASE MANAGEMENT
[Pathological] : TNM Stage: p [IIA] : IIA [FreeTextEntry4] : recurrent, IDC grade 3, ER-/WV-, HER2+ [NTNM] : 0 [TTNM] : 2 [MTNM] : 0 [de-identified] : [id number] [de-identified] : right breast

## 2021-01-22 ENCOUNTER — NON-APPOINTMENT (OUTPATIENT)
Age: 61
End: 2021-01-22

## 2021-01-22 NOTE — DISEASE MANAGEMENT
[Pathological] : TNM Stage: p [FreeTextEntry4] : recurrent, IDC grade 3, ER-/MI-, HER2+ [TTNM] : 2 [NTNM] : 0 [MTNM] : 0 [IIA] : IIA [de-identified] : 309 [de-identified] : right breast [de-identified] : 1923

## 2021-01-22 NOTE — PHYSICAL EXAM
[Normal] : oriented to person, place and time, the affect was normal, the mood was normal and not anxious [de-identified] : mild erythema of the right LIQ

## 2021-01-26 ENCOUNTER — APPOINTMENT (OUTPATIENT)
Dept: HEMATOLOGY ONCOLOGY | Facility: CLINIC | Age: 61
End: 2021-01-26

## 2021-01-26 ENCOUNTER — LABORATORY RESULT (OUTPATIENT)
Age: 61
End: 2021-01-26

## 2021-01-26 PROCEDURE — 77427 RADIATION TX MANAGEMENT X5: CPT

## 2021-01-28 ENCOUNTER — NON-APPOINTMENT (OUTPATIENT)
Age: 61
End: 2021-01-28

## 2021-01-28 VITALS
OXYGEN SATURATION: 99 % | SYSTOLIC BLOOD PRESSURE: 157 MMHG | HEIGHT: 62 IN | TEMPERATURE: 97.1 F | DIASTOLIC BLOOD PRESSURE: 91 MMHG | HEART RATE: 86 BPM | WEIGHT: 192 LBS | RESPIRATION RATE: 16 BRPM | BODY MASS INDEX: 35.33 KG/M2

## 2021-01-28 NOTE — REVIEW OF SYSTEMS
[Fatigue: Grade 0] : Fatigue: Grade 0 [Localized Edema: Grade 0] : Localized Edema: Grade 0  [Pruritus: Grade 0] : Pruritus: Grade 0 [Skin Hyperpigmentation: Grade 0] : Skin Hyperpigmentation: Grade 0 [Dermatitis Radiation: Grade 1 - Faint erythema or dry desquamation] : Dermatitis Radiation: Grade 1 - Faint erythema or dry desquamation

## 2021-01-28 NOTE — PHYSICAL EXAM
[Normal] : oriented to person, place and time, the affect was normal, the mood was normal and not anxious [de-identified] : mild erythema of the right LIQ

## 2021-01-28 NOTE — DISEASE MANAGEMENT
[Pathological] : TNM Stage: p [IIA] : IIA [FreeTextEntry4] : recurrent, IDC grade 3, ER-/DE-, HER2+ [TTNM] : 2 [NTNM] : 0 [MTNM] : 0 [de-identified] : [id number] [de-identified] : [id number] [de-identified] : right breast

## 2021-02-02 ENCOUNTER — APPOINTMENT (OUTPATIENT)
Age: 61
End: 2021-02-02

## 2021-02-02 ENCOUNTER — RESULT REVIEW (OUTPATIENT)
Age: 61
End: 2021-02-02

## 2021-02-02 LAB
BASOPHILS # BLD AUTO: 0.1 K/UL — SIGNIFICANT CHANGE UP (ref 0–0.2)
BASOPHILS NFR BLD AUTO: 1.3 % — SIGNIFICANT CHANGE UP (ref 0–2)
EOSINOPHIL # BLD AUTO: 0.2 K/UL — SIGNIFICANT CHANGE UP (ref 0–0.5)
EOSINOPHIL NFR BLD AUTO: 4.1 % — SIGNIFICANT CHANGE UP (ref 0–6)
HCT VFR BLD CALC: 41 % — SIGNIFICANT CHANGE UP (ref 34.5–45)
HGB BLD-MCNC: 13.4 G/DL — SIGNIFICANT CHANGE UP (ref 11.5–15.5)
LYMPHOCYTES # BLD AUTO: 0.8 K/UL — LOW (ref 1–3.3)
LYMPHOCYTES # BLD AUTO: 13.6 % — SIGNIFICANT CHANGE UP (ref 13–44)
MCHC RBC-ENTMCNC: 27 PG — SIGNIFICANT CHANGE UP (ref 27–34)
MCHC RBC-ENTMCNC: 32.6 G/DL — SIGNIFICANT CHANGE UP (ref 32–36)
MCV RBC AUTO: 82.6 FL — SIGNIFICANT CHANGE UP (ref 80–100)
MONOCYTES # BLD AUTO: 0.6 K/UL — SIGNIFICANT CHANGE UP (ref 0–0.9)
MONOCYTES NFR BLD AUTO: 10.7 % — SIGNIFICANT CHANGE UP (ref 2–14)
NEUTROPHILS # BLD AUTO: 4.2 K/UL — SIGNIFICANT CHANGE UP (ref 1.8–7.4)
NEUTROPHILS NFR BLD AUTO: 70.3 % — SIGNIFICANT CHANGE UP (ref 43–77)
PLATELET # BLD AUTO: 216 K/UL — SIGNIFICANT CHANGE UP (ref 150–400)
RBC # BLD: 4.96 M/UL — SIGNIFICANT CHANGE UP (ref 3.8–5.2)
RBC # FLD: 11.4 % — SIGNIFICANT CHANGE UP (ref 10.3–14.5)
WBC # BLD: 6 K/UL — SIGNIFICANT CHANGE UP (ref 3.8–10.5)
WBC # FLD AUTO: 6 K/UL — SIGNIFICANT CHANGE UP (ref 3.8–10.5)

## 2021-02-03 PROCEDURE — 77427 RADIATION TX MANAGEMENT X5: CPT

## 2021-02-04 ENCOUNTER — NON-APPOINTMENT (OUTPATIENT)
Age: 61
End: 2021-02-04

## 2021-02-04 VITALS
DIASTOLIC BLOOD PRESSURE: 84 MMHG | OXYGEN SATURATION: 100 % | TEMPERATURE: 98.1 F | HEART RATE: 85 BPM | WEIGHT: 187 LBS | SYSTOLIC BLOOD PRESSURE: 141 MMHG | BODY MASS INDEX: 34.41 KG/M2 | HEIGHT: 62 IN | RESPIRATION RATE: 16 BRPM

## 2021-02-04 DIAGNOSIS — R11.0 NAUSEA: ICD-10-CM

## 2021-02-04 NOTE — DISEASE MANAGEMENT
[Pathological] : TNM Stage: p [IIA] : IIA [FreeTextEntry4] : recurrent, IDC grade 3, ER-/VA-, HER2+ [TTNM] : 2 [NTNM] : 0 [MTNM] : 0 [de-identified] : 1239 [de-identified] : 4416 [de-identified] : right breast

## 2021-02-04 NOTE — PHYSICAL EXAM
[Normal] : oriented to person, place and time, the affect was normal, the mood was normal and not anxious [de-identified] : mild erythema of the right LIQ

## 2021-02-04 NOTE — REVIEW OF SYSTEMS
[Localized Edema: Grade 0] : Localized Edema: Grade 0  [Pruritus: Grade 0] : Pruritus: Grade 0 [Skin Hyperpigmentation: Grade 0] : Skin Hyperpigmentation: Grade 0 [Dermatitis Radiation: Grade 1 - Faint erythema or dry desquamation] : Dermatitis Radiation: Grade 1 - Faint erythema or dry desquamation [Edema Limbs: Grade 0] : Edema Limbs: Grade 0  [Fatigue: Grade 1 - Fatigue relieved by rest] : Fatigue: Grade 1 - Fatigue relieved by rest [Skin Atrophy: Grade 0] : Skin Atrophy: Grade 0

## 2021-02-08 ENCOUNTER — NON-APPOINTMENT (OUTPATIENT)
Age: 61
End: 2021-02-08

## 2021-02-10 PROCEDURE — 77427 RADIATION TX MANAGEMENT X5: CPT

## 2021-02-11 ENCOUNTER — NON-APPOINTMENT (OUTPATIENT)
Age: 61
End: 2021-02-11

## 2021-02-11 VITALS
SYSTOLIC BLOOD PRESSURE: 142 MMHG | HEIGHT: 62 IN | DIASTOLIC BLOOD PRESSURE: 87 MMHG | OXYGEN SATURATION: 99 % | RESPIRATION RATE: 16 BRPM | TEMPERATURE: 98 F | WEIGHT: 187 LBS | HEART RATE: 71 BPM | BODY MASS INDEX: 34.41 KG/M2

## 2021-02-11 NOTE — DISEASE MANAGEMENT
[Pathological] : TNM Stage: p [IIA] : IIA [FreeTextEntry4] : recurrent, IDC grade 3, ER-/CT-, HER2+ [TTNM] : 2 [NTNM] : 0 [MTNM] : 0 [de-identified] : [id number] [de-identified] : 3685 [de-identified] : right breast

## 2021-02-11 NOTE — PHYSICAL EXAM
[Normal] : oriented to person, place and time, the affect was normal, the mood was normal and not anxious [de-identified] : mild hyperpigmentation along the right clavicle, early dry desquamation; mild erythema of the right chest

## 2021-02-11 NOTE — REVIEW OF SYSTEMS
[Edema Limbs: Grade 0] : Edema Limbs: Grade 0  [Fatigue: Grade 1 - Fatigue relieved by rest] : Fatigue: Grade 1 - Fatigue relieved by rest [Localized Edema: Grade 0] : Localized Edema: Grade 0  [Pruritus: Grade 0] : Pruritus: Grade 0 [Skin Atrophy: Grade 0] : Skin Atrophy: Grade 0 [Dermatitis Radiation: Grade 1 - Faint erythema or dry desquamation] : Dermatitis Radiation: Grade 1 - Faint erythema or dry desquamation [Skin Hyperpigmentation: Grade 1 - Hyperpigmentation covering <10% BSA; no psychosocial impact] : Skin Hyperpigmentation: Grade 1 - Hyperpigmentation covering <10% BSA; no psychosocial impact

## 2021-02-17 NOTE — REVIEW OF SYSTEMS
[Edema Limbs: Grade 0] : Edema Limbs: Grade 0  [Fatigue: Grade 1 - Fatigue relieved by rest] : Fatigue: Grade 1 - Fatigue relieved by rest [Localized Edema: Grade 0] : Localized Edema: Grade 0  [Pruritus: Grade 0] : Pruritus: Grade 0 [Skin Atrophy: Grade 0] : Skin Atrophy: Grade 0 [Skin Hyperpigmentation: Grade 1 - Hyperpigmentation covering <10% BSA; no psychosocial impact] : Skin Hyperpigmentation: Grade 1 - Hyperpigmentation covering <10% BSA; no psychosocial impact [Dermatitis Radiation: Grade 1 - Faint erythema or dry desquamation] : Dermatitis Radiation: Grade 1 - Faint erythema or dry desquamation

## 2021-02-18 ENCOUNTER — OUTPATIENT (OUTPATIENT)
Dept: OUTPATIENT SERVICES | Facility: HOSPITAL | Age: 61
LOS: 1 days | Discharge: HOME | End: 2021-02-18

## 2021-02-18 ENCOUNTER — NON-APPOINTMENT (OUTPATIENT)
Age: 61
End: 2021-02-18

## 2021-02-18 VITALS
OXYGEN SATURATION: 100 % | SYSTOLIC BLOOD PRESSURE: 124 MMHG | DIASTOLIC BLOOD PRESSURE: 82 MMHG | HEART RATE: 86 BPM | TEMPERATURE: 98 F | HEIGHT: 62 IN | BODY MASS INDEX: 34.78 KG/M2 | RESPIRATION RATE: 16 BRPM | WEIGHT: 189 LBS

## 2021-02-18 DIAGNOSIS — Z98.890 OTHER SPECIFIED POSTPROCEDURAL STATES: Chronic | ICD-10-CM

## 2021-02-18 DIAGNOSIS — Z90.710 ACQUIRED ABSENCE OF BOTH CERVIX AND UTERUS: Chronic | ICD-10-CM

## 2021-02-18 DIAGNOSIS — Z95.828 PRESENCE OF OTHER VASCULAR IMPLANTS AND GRAFTS: Chronic | ICD-10-CM

## 2021-02-18 DIAGNOSIS — C50.919 MALIGNANT NEOPLASM OF UNSPECIFIED SITE OF UNSPECIFIED FEMALE BREAST: ICD-10-CM

## 2021-02-18 DIAGNOSIS — Z98.891 HISTORY OF UTERINE SCAR FROM PREVIOUS SURGERY: Chronic | ICD-10-CM

## 2021-02-18 DIAGNOSIS — Z90.13 ACQUIRED ABSENCE OF BILATERAL BREASTS AND NIPPLES: Chronic | ICD-10-CM

## 2021-02-18 DIAGNOSIS — T82.9XXA UNSPECIFIED COMPLICATION OF CARDIAC AND VASCULAR PROSTHETIC DEVICE, IMPLANT AND GRAFT, INITIAL ENCOUNTER: Chronic | ICD-10-CM

## 2021-02-18 PROCEDURE — 77427 RADIATION TX MANAGEMENT X5: CPT

## 2021-02-18 NOTE — DISEASE MANAGEMENT
[Pathological] : TNM Stage: p [IIA] : IIA [TTNM] : 2 [FreeTextEntry4] : recurrent, IDC grade 3, ER-/RI-, HER2+ [NTNM] : 0 [MTNM] : 0 [de-identified] : [id number] [de-identified] : 3550 [de-identified] : right breast

## 2021-02-18 NOTE — PHYSICAL EXAM
[Normal] : oriented to person, place and time, the affect was normal, the mood was normal and not anxious [de-identified] : moderate erythema along the right clavicle, early dry desquamation; mild-to-moderate erythema of the right inframammary fold

## 2021-02-23 ENCOUNTER — RESULT REVIEW (OUTPATIENT)
Age: 61
End: 2021-02-23

## 2021-02-23 ENCOUNTER — APPOINTMENT (OUTPATIENT)
Dept: HEMATOLOGY ONCOLOGY | Facility: CLINIC | Age: 61
End: 2021-02-23
Payer: COMMERCIAL

## 2021-02-23 ENCOUNTER — APPOINTMENT (OUTPATIENT)
Age: 61
End: 2021-02-23

## 2021-02-23 VITALS
DIASTOLIC BLOOD PRESSURE: 87 MMHG | BODY MASS INDEX: 35.15 KG/M2 | OXYGEN SATURATION: 97 % | HEART RATE: 95 BPM | HEIGHT: 62 IN | SYSTOLIC BLOOD PRESSURE: 149 MMHG | WEIGHT: 191 LBS

## 2021-02-23 LAB
BASOPHILS # BLD AUTO: 0.1 K/UL — SIGNIFICANT CHANGE UP (ref 0–0.2)
BASOPHILS NFR BLD AUTO: 1 % — SIGNIFICANT CHANGE UP (ref 0–2)
EOSINOPHIL # BLD AUTO: 0.1 K/UL — SIGNIFICANT CHANGE UP (ref 0–0.5)
EOSINOPHIL NFR BLD AUTO: 2.7 % — SIGNIFICANT CHANGE UP (ref 0–6)
HCT VFR BLD CALC: 40.3 % — SIGNIFICANT CHANGE UP (ref 34.5–45)
HGB BLD-MCNC: 13.1 G/DL — SIGNIFICANT CHANGE UP (ref 11.5–15.5)
LYMPHOCYTES # BLD AUTO: 0.4 K/UL — LOW (ref 1–3.3)
LYMPHOCYTES # BLD AUTO: 7.9 % — LOW (ref 13–44)
MCHC RBC-ENTMCNC: 27.6 PG — SIGNIFICANT CHANGE UP (ref 27–34)
MCHC RBC-ENTMCNC: 32.5 G/DL — SIGNIFICANT CHANGE UP (ref 32–36)
MCV RBC AUTO: 84.7 FL — SIGNIFICANT CHANGE UP (ref 80–100)
MONOCYTES # BLD AUTO: 0.5 K/UL — SIGNIFICANT CHANGE UP (ref 0–0.9)
MONOCYTES NFR BLD AUTO: 9.1 % — SIGNIFICANT CHANGE UP (ref 2–14)
NEUTROPHILS # BLD AUTO: 4.3 K/UL — SIGNIFICANT CHANGE UP (ref 1.8–7.4)
NEUTROPHILS NFR BLD AUTO: 79.3 % — HIGH (ref 43–77)
PLATELET # BLD AUTO: 210 K/UL — SIGNIFICANT CHANGE UP (ref 150–400)
RBC # BLD: 4.76 M/UL — SIGNIFICANT CHANGE UP (ref 3.8–5.2)
RBC # FLD: 12.1 % — SIGNIFICANT CHANGE UP (ref 10.3–14.5)
WBC # BLD: 5.5 K/UL — SIGNIFICANT CHANGE UP (ref 3.8–10.5)
WBC # FLD AUTO: 5.5 K/UL — SIGNIFICANT CHANGE UP (ref 3.8–10.5)

## 2021-02-23 PROCEDURE — 99212 OFFICE O/P EST SF 10 MIN: CPT

## 2021-02-23 PROCEDURE — 99072 ADDL SUPL MATRL&STAF TM PHE: CPT

## 2021-02-23 NOTE — ASSESSMENT
[FreeTextEntry1] : \par 58 year old postmenopausal female with stage IIA (T2) ER-/OK-/HER2 positive right breast cancer. S/p 6 cycles of neoadjuvant TCHP followed by bilateral mastectomy + BUNNY on 3/14/18, ypT0 ypN0 (pathologic complete response). Completed 1 year of Herceptin on 11/26/18. \par She developed R mastectomy flap recurrence in 10/2020, s/p excision on 11/6/20 -> 3.8 cm poorly differentiated IDC, ER-/OK-/HER2 positive. \par \par Finishing breast RT this week, tolerating Kadcyla.

## 2021-02-23 NOTE — HISTORY OF PRESENT ILLNESS
[de-identified] : \par Ms. Young was diagnosed with HER2 positive right breast cancer at age 56 in August 2017.\par \par She initially presented with right nipple yellowish discharge. Subsequent mammogram and ultrasound on 7/27/17 showed new spiculated mass in inner central posterior right breast with sonographic confirmation of suspicious mass at 3-4:00 region, and an indeterminate hypoechoic nodule at 1:00 right breast.\par \par On 8/3/17 she had right breast core biopsy of 3-4:00 mass --> pathology showed invasive poorly differentiated ductal carcinoma, Enzo grade 8/9, 0.9 cm--> ER-, KY-, HER2 IHC 3+ (positive)\par Right breast 1:00 --> fibrocystic change, proliferative type with nodular sclerosing adenosis\par \par 8/21/17 MRI breast - Biopsy clip and 3.8 cm area of irregular non mass enhancement in the right breast at the 3-4:00 location posteriorly, corresponding to recently\par diagnosed invasive carcinoma. Additional irregular areas of enhancement in the right breast at the 2-3:00 location and 8-9:00 location, suspicious for additional disease. Representative MR guided core biopsy of enhancement at the 8-9:00 location can be performed to evaluate for multicentric disease, if it would change\par management. 8 mm enhancing nodule demonstrating washout kinetics in the upper slightly inner mid left breast. MR guided core biopsy is advised.\par Clip artifact in the right breast at the 1:00 location marking site of biopsy-proven radial scar. No associated suspicious enhancement. Recommend\par appropriate surgical management. 2.1 cm oval T2 bright lesion in the liver, probable cyst or hemangioma.\par Recommend dedicated liver ultrasound for further evaluation.\par \par 8/28/17 MRI Core biopsy right breast lower outer quadrant --> DCIS, solid pattern, high grade atypia, focal necrosis. ER/KY pending\par Core biopsy left breast central inner --> mild proliferative fibrocystic change\par \par Treatment Summary\par 9/21/17 port placement\par 9/20/17 - 1/3/18 TCHP x 6 cycles (9/30/17 - 1/03/18\par 5/03/18 - 11/26/18 Trastuzumab (Herceptin) X every 3 weeks, to complete 52 weeks.\par She had breast reconstruction on 8/14/18 with nipple reconstruction.\par S/p port removal on 6/19/19.\par \par 3/14/18 - She underwent right skin sparing mastectomy with SNL biopsy and left risk reduction mastectomy with BUNNY reconstruction.\par 1- Left breast, mastectomy- Fibrocystic changes\par 2- Right sentinel lymph node, excision- Three negative lymph nodes (0/3)\par 3- Right sentinel lymph node 2, excision- One negative lymph node (0/1)\par 4- Right breast, mastectomy- No residual invasive carcinoma (s/p neoadjuvant treatment)- No residual DCIS (s/p neoadjuvant treatment)\par  Pathologic staging: ypT0, ypN0\par \par . \par \par Disease: right breast cancer \par Pathology: HER2 positive \par TNM stage: T2 \par \par \par Interval History: Patient returns for follow up. \par She had a R breast US on 10/20/20 for palpable R breast lump which showed 3.5 cm mass at 4:00, 9 cmfn. \par 10/26/20 US guided biopsy R breast 4:00 --Breast, right, 4 o'clock, 9 cm FN, ultrasound guided core biopsy\par - Invasive poorly differentiated ductal carcinoma withfocal necrosis and foci with micropapillary features\par - Erie score 8/9 (3 + 3+ 2 )\par - Invasive tumor measures at least 1.5 cm\par -ER 0%, KY 0%, HER2 3+\par \par On 11/6/20 she underwent right mastectomy flap excision - IDC, poorly differentiated with necrosis, involvement of dermal skin and retraction artifact, 3.8 x 3.1 x 3.1\par -ER 0%, KY 0%, HER2 3+. \par  [de-identified] : Completes breast radiation this week.\par Reports change in taste and occasional nausea.\par Otherwise tolerating Kadcyla well.\par Repeat echocardiogram scheduled for late March.

## 2021-02-24 DIAGNOSIS — Z51.11 ENCOUNTER FOR ANTINEOPLASTIC CHEMOTHERAPY: ICD-10-CM

## 2021-02-25 ENCOUNTER — NON-APPOINTMENT (OUTPATIENT)
Age: 61
End: 2021-02-25

## 2021-02-25 VITALS
HEIGHT: 62 IN | HEART RATE: 97 BPM | RESPIRATION RATE: 16 BRPM | DIASTOLIC BLOOD PRESSURE: 96 MMHG | OXYGEN SATURATION: 97 % | BODY MASS INDEX: 35.15 KG/M2 | WEIGHT: 191 LBS | TEMPERATURE: 98.1 F | SYSTOLIC BLOOD PRESSURE: 157 MMHG

## 2021-02-25 PROCEDURE — 77427 RADIATION TX MANAGEMENT X5: CPT

## 2021-02-25 NOTE — HISTORY OF PRESENT ILLNESS
[Need for onging (adjuvant) treatment for cancer?] : Need for onging (adjuvant) treatment for cancer? Yes [Follow up with Oncologist in _____] : Follow up with Oncologist in [unfilled] [Follow up with Radiation MD in _____] : Follow up with Radiation MD in [unfilled] [Fatigue] : Fatigue [Bridge to Survivorship Breast Cancer] : Bridge to Survivorship Breast Cancer

## 2021-02-25 NOTE — PHYSICAL EXAM
[Normal] : oriented to person, place and time, the affect was normal, the mood was normal and not anxious [de-identified] : moderate erythema along the right clavicle; stable dry dry desquamation; moderate erythema of the right inframammary fold; no moist desquamation

## 2021-02-25 NOTE — DISEASE MANAGEMENT
[Pathological] : TNM Stage: p [IIA] : IIA [FreeTextEntry4] : recurrent, IDC grade 3, ER-/HI-, HER2+ [TTNM] : 2 [NTNM] : 0 [MTNM] : 0 [de-identified] : [id number] [de-identified] : 4809 [de-identified] : right breast

## 2021-03-02 ENCOUNTER — NON-APPOINTMENT (OUTPATIENT)
Age: 61
End: 2021-03-02

## 2021-03-10 ENCOUNTER — TRANSCRIPTION ENCOUNTER (OUTPATIENT)
Age: 61
End: 2021-03-10

## 2021-03-16 ENCOUNTER — RESULT REVIEW (OUTPATIENT)
Age: 61
End: 2021-03-16

## 2021-03-16 ENCOUNTER — APPOINTMENT (OUTPATIENT)
Age: 61
End: 2021-03-16

## 2021-03-16 LAB
BASOPHILS # BLD AUTO: 0.1 K/UL — SIGNIFICANT CHANGE UP (ref 0–0.2)
BASOPHILS NFR BLD AUTO: 1.4 % — SIGNIFICANT CHANGE UP (ref 0–2)
EOSINOPHIL # BLD AUTO: 0.2 K/UL — SIGNIFICANT CHANGE UP (ref 0–0.5)
EOSINOPHIL NFR BLD AUTO: 4.5 % — SIGNIFICANT CHANGE UP (ref 0–6)
HCT VFR BLD CALC: 38.8 % — SIGNIFICANT CHANGE UP (ref 34.5–45)
HGB BLD-MCNC: 12.6 G/DL — SIGNIFICANT CHANGE UP (ref 11.5–15.5)
LYMPHOCYTES # BLD AUTO: 0.5 K/UL — LOW (ref 1–3.3)
LYMPHOCYTES # BLD AUTO: 11.6 % — LOW (ref 13–44)
MCHC RBC-ENTMCNC: 27 PG — SIGNIFICANT CHANGE UP (ref 27–34)
MCHC RBC-ENTMCNC: 32.5 G/DL — SIGNIFICANT CHANGE UP (ref 32–36)
MCV RBC AUTO: 82.9 FL — SIGNIFICANT CHANGE UP (ref 80–100)
MONOCYTES # BLD AUTO: 0.5 K/UL — SIGNIFICANT CHANGE UP (ref 0–0.9)
MONOCYTES NFR BLD AUTO: 10.8 % — SIGNIFICANT CHANGE UP (ref 2–14)
NEUTROPHILS # BLD AUTO: 3.4 K/UL — SIGNIFICANT CHANGE UP (ref 1.8–7.4)
NEUTROPHILS NFR BLD AUTO: 71.8 % — SIGNIFICANT CHANGE UP (ref 43–77)
PLATELET # BLD AUTO: 188 K/UL — SIGNIFICANT CHANGE UP (ref 150–400)
RBC # BLD: 4.69 M/UL — SIGNIFICANT CHANGE UP (ref 3.8–5.2)
RBC # FLD: 12.3 % — SIGNIFICANT CHANGE UP (ref 10.3–14.5)
WBC # BLD: 4.7 K/UL — SIGNIFICANT CHANGE UP (ref 3.8–10.5)
WBC # FLD AUTO: 4.7 K/UL — SIGNIFICANT CHANGE UP (ref 3.8–10.5)

## 2021-03-25 ENCOUNTER — APPOINTMENT (OUTPATIENT)
Dept: CARDIOLOGY | Facility: CLINIC | Age: 61
End: 2021-03-25
Payer: COMMERCIAL

## 2021-03-25 PROCEDURE — 99072 ADDL SUPL MATRL&STAF TM PHE: CPT

## 2021-03-25 PROCEDURE — 93356 MYOCRD STRAIN IMG SPCKL TRCK: CPT

## 2021-03-25 PROCEDURE — 93306 TTE W/DOPPLER COMPLETE: CPT

## 2021-03-25 PROCEDURE — 76376 3D RENDER W/INTRP POSTPROCES: CPT

## 2021-03-30 ENCOUNTER — OUTPATIENT (OUTPATIENT)
Dept: OUTPATIENT SERVICES | Facility: HOSPITAL | Age: 61
LOS: 1 days | Discharge: HOME | End: 2021-03-30

## 2021-03-30 DIAGNOSIS — Z98.891 HISTORY OF UTERINE SCAR FROM PREVIOUS SURGERY: Chronic | ICD-10-CM

## 2021-03-30 DIAGNOSIS — Z98.890 OTHER SPECIFIED POSTPROCEDURAL STATES: Chronic | ICD-10-CM

## 2021-03-30 DIAGNOSIS — C50.919 MALIGNANT NEOPLASM OF UNSPECIFIED SITE OF UNSPECIFIED FEMALE BREAST: ICD-10-CM

## 2021-03-30 DIAGNOSIS — T82.9XXA UNSPECIFIED COMPLICATION OF CARDIAC AND VASCULAR PROSTHETIC DEVICE, IMPLANT AND GRAFT, INITIAL ENCOUNTER: Chronic | ICD-10-CM

## 2021-03-30 DIAGNOSIS — Z90.710 ACQUIRED ABSENCE OF BOTH CERVIX AND UTERUS: Chronic | ICD-10-CM

## 2021-03-30 DIAGNOSIS — Z90.13 ACQUIRED ABSENCE OF BILATERAL BREASTS AND NIPPLES: Chronic | ICD-10-CM

## 2021-03-30 DIAGNOSIS — Z95.828 PRESENCE OF OTHER VASCULAR IMPLANTS AND GRAFTS: Chronic | ICD-10-CM

## 2021-03-31 ENCOUNTER — NON-APPOINTMENT (OUTPATIENT)
Age: 61
End: 2021-03-31

## 2021-04-06 ENCOUNTER — RESULT REVIEW (OUTPATIENT)
Age: 61
End: 2021-04-06

## 2021-04-06 ENCOUNTER — APPOINTMENT (OUTPATIENT)
Age: 61
End: 2021-04-06

## 2021-04-06 LAB
BASOPHILS # BLD AUTO: 0.1 K/UL — SIGNIFICANT CHANGE UP (ref 0–0.2)
BASOPHILS NFR BLD AUTO: 1.3 % — SIGNIFICANT CHANGE UP (ref 0–2)
EOSINOPHIL # BLD AUTO: 0.4 K/UL — SIGNIFICANT CHANGE UP (ref 0–0.5)
EOSINOPHIL NFR BLD AUTO: 7.4 % — HIGH (ref 0–6)
HCT VFR BLD CALC: 38.9 % — SIGNIFICANT CHANGE UP (ref 34.5–45)
HGB BLD-MCNC: 12.7 G/DL — SIGNIFICANT CHANGE UP (ref 11.5–15.5)
LYMPHOCYTES # BLD AUTO: 0.7 K/UL — LOW (ref 1–3.3)
LYMPHOCYTES # BLD AUTO: 13.2 % — SIGNIFICANT CHANGE UP (ref 13–44)
MCHC RBC-ENTMCNC: 27.7 PG — SIGNIFICANT CHANGE UP (ref 27–34)
MCHC RBC-ENTMCNC: 32.6 G/DL — SIGNIFICANT CHANGE UP (ref 32–36)
MCV RBC AUTO: 85 FL — SIGNIFICANT CHANGE UP (ref 80–100)
MONOCYTES # BLD AUTO: 0.4 K/UL — SIGNIFICANT CHANGE UP (ref 0–0.9)
MONOCYTES NFR BLD AUTO: 8.1 % — SIGNIFICANT CHANGE UP (ref 2–14)
NEUTROPHILS # BLD AUTO: 3.7 K/UL — SIGNIFICANT CHANGE UP (ref 1.8–7.4)
NEUTROPHILS NFR BLD AUTO: 70 % — SIGNIFICANT CHANGE UP (ref 43–77)
PLATELET # BLD AUTO: 164 K/UL — SIGNIFICANT CHANGE UP (ref 150–400)
RBC # BLD: 4.58 M/UL — SIGNIFICANT CHANGE UP (ref 3.8–5.2)
RBC # FLD: 12.6 % — SIGNIFICANT CHANGE UP (ref 10.3–14.5)
WBC # BLD: 5.3 K/UL — SIGNIFICANT CHANGE UP (ref 3.8–10.5)
WBC # FLD AUTO: 5.3 K/UL — SIGNIFICANT CHANGE UP (ref 3.8–10.5)

## 2021-04-07 ENCOUNTER — NON-APPOINTMENT (OUTPATIENT)
Age: 61
End: 2021-04-07

## 2021-04-07 DIAGNOSIS — Z51.11 ENCOUNTER FOR ANTINEOPLASTIC CHEMOTHERAPY: ICD-10-CM

## 2021-04-13 ENCOUNTER — APPOINTMENT (OUTPATIENT)
Dept: HEMATOLOGY ONCOLOGY | Facility: CLINIC | Age: 61
End: 2021-04-13
Payer: COMMERCIAL

## 2021-04-13 ENCOUNTER — APPOINTMENT (OUTPATIENT)
Dept: RADIATION ONCOLOGY | Facility: CLINIC | Age: 61
End: 2021-04-13
Payer: COMMERCIAL

## 2021-04-13 ENCOUNTER — NON-APPOINTMENT (OUTPATIENT)
Age: 61
End: 2021-04-13

## 2021-04-13 VITALS
DIASTOLIC BLOOD PRESSURE: 97 MMHG | WEIGHT: 191 LBS | SYSTOLIC BLOOD PRESSURE: 159 MMHG | HEIGHT: 61 IN | RESPIRATION RATE: 16 BRPM | TEMPERATURE: 98 F | OXYGEN SATURATION: 98 % | BODY MASS INDEX: 36.06 KG/M2 | HEART RATE: 96 BPM

## 2021-04-13 VITALS
WEIGHT: 187.03 LBS | DIASTOLIC BLOOD PRESSURE: 93 MMHG | HEIGHT: 61 IN | BODY MASS INDEX: 35.31 KG/M2 | SYSTOLIC BLOOD PRESSURE: 144 MMHG | HEART RATE: 85 BPM | OXYGEN SATURATION: 96 %

## 2021-04-13 PROCEDURE — 99214 OFFICE O/P EST MOD 30 MIN: CPT

## 2021-04-13 PROCEDURE — 99024 POSTOP FOLLOW-UP VISIT: CPT

## 2021-04-13 PROCEDURE — 99072 ADDL SUPL MATRL&STAF TM PHE: CPT

## 2021-04-13 RX ORDER — VALACYCLOVIR 1 G/1
1 TABLET, FILM COATED ORAL 3 TIMES DAILY
Qty: 21 | Refills: 0 | Status: DISCONTINUED | COMMUNITY
Start: 2021-01-06 | End: 2021-04-13

## 2021-04-13 NOTE — HISTORY OF PRESENT ILLNESS
[FreeTextEntry1] : 60 year old woman with ipsilateral tumor recurrence (rpT2 N0, grade 3, ER-/DE-, HER2+), s/p right mastectomy flap excision on 11/6/2020, completed post-mastectomy radiation therapy on 2/25/21.  Continues on Kadcyla.\par \par Radiation dermatitis improved over the past month, with resolution of pruritus.  Hyperpigmentation has improved as well.  No desquamation.  Denies chest wall pain, lymphedema, fatigue.\par \par Dr. Reddy \par Dr. Gutierrez\par Dr. Riki Laird ([place])

## 2021-04-13 NOTE — REVIEW OF SYSTEMS
[Negative] : Integumentary [Dysphagia] : no dysphagia [Loss of Hearing] : no loss of hearing [Nosebleeds] : no nosebleeds [Hoarseness] : no hoarseness [Odynophagia] : no odynophagia [Mucosal Pain] : no mucosal pain [Shortness Of Breath] : no shortness of breath [Wheezing] : no wheezing [SOB on Exertion] : no shortness of breath during exertion [FreeTextEntry4] : "gooey and thick" saliva and at times lack of saliva  [FreeTextEntry6] : Intermittent non-productive cough while deep breathing otherwise negative

## 2021-04-13 NOTE — PHYSICAL EXAM
[Normal] : oriented to person, place and time, the affect was normal, the mood was normal and not anxious [Extraocular Movements] : extraocular movements were intact [Outer Ear] : the ears and nose were normal in appearance [No UE Edema] : there is no upper extremity edema [Breast Palpation Mass] : no palpable masses [de-identified] : mild right chest wall edema LIQ, with lingering hyperpigmentation; no desquamation

## 2021-04-13 NOTE — ASSESSMENT
[FreeTextEntry1] : 58 year old postmenopausal female with stage IIA (T2) ER-/SD-/HER2 positive right breast cancer. S/p 6 cycles of neoadjuvant TCHP followed by bilateral mastectomy + BUNNY on 3/14/18, ypT0 ypN0 (pathologic complete response). Completed 1 year of Herceptin on 11/26/18. \par She developed R mastectomy flap recurrence in 10/2020, s/p excision on 11/6/20 -> 3.8 cm poorly differentiated IDC, ER-/SD-/HER2 positive. Completed breast radiation 2/25/21.\par \par \par Completed breast RT this week, tolerating Kadcyla except for xerostomia. \par \par \par Plan: \par -Continue Kadcyla for now, 14 vs 18 treatments\par -Reviewed 3/25/21 Echocardiogram LV EF 60-65% which is unchanged since previous echocardiogram on 12/21/20, continue echo q 3 months\par -consider neratinib post kadcyla\par -xerostomia - Xylimelts OTC recommended. \par -Patient is planning for vacation on June 5th which falls on a treatment week, patient advised to reschedule for the following week\par -Follow up June 2021

## 2021-04-13 NOTE — REVIEW OF SYSTEMS
[Negative] : Allergic/Immunologic [Edema Limbs: Grade 0] : Edema Limbs: Grade 0  [Fatigue: Grade 0] : Fatigue: Grade 0 [Localized Edema: Grade 0] : Localized Edema: Grade 0  [Neck Edema: Grade 0] : Neck Edema: Grade 0 [Breast Pain: Grade 0] : Breast Pain: Grade 0 [Pruritus: Grade 0] : Pruritus: Grade 0 [Skin Hyperpigmentation: Grade 1 - Hyperpigmentation covering <10% BSA; no psychosocial impact] : Skin Hyperpigmentation: Grade 1 - Hyperpigmentation covering <10% BSA; no psychosocial impact [Skin Induration: Grade 1 - Mild induration, able to move skin parallel to plane (sliding) and perpendicular to skin (pinching up)] : Skin Induration: Grade 1 - Mild induration, able to move skin parallel to plane (sliding) and perpendicular to skin (pinching up) [Dermatitis Radiation: Grade 0] : Dermatitis Radiation: Grade 0

## 2021-04-13 NOTE — HISTORY OF PRESENT ILLNESS
[de-identified] : \par Ms. Young was diagnosed with HER2 positive right breast cancer at age 56 in August 2017.\par \par She initially presented with right nipple yellowish discharge. Subsequent mammogram and ultrasound on 7/27/17 showed new spiculated mass in inner central posterior right breast with sonographic confirmation of suspicious mass at 3-4:00 region, and an indeterminate hypoechoic nodule at 1:00 right breast.\par \par On 8/3/17 she had right breast core biopsy of 3-4:00 mass --> pathology showed invasive poorly differentiated ductal carcinoma, Enzo grade 8/9, 0.9 cm--> ER-, NC-, HER2 IHC 3+ (positive)\par Right breast 1:00 --> fibrocystic change, proliferative type with nodular sclerosing adenosis\par \par 8/21/17 MRI breast - Biopsy clip and 3.8 cm area of irregular non mass enhancement in the right breast at the 3-4:00 location posteriorly, corresponding to recently\par diagnosed invasive carcinoma. Additional irregular areas of enhancement in the right breast at the 2-3:00 location and 8-9:00 location, suspicious for additional disease. Representative MR guided core biopsy of enhancement at the 8-9:00 location can be performed to evaluate for multicentric disease, if it would change\par management. 8 mm enhancing nodule demonstrating washout kinetics in the upper slightly inner mid left breast. MR guided core biopsy is advised.\par Clip artifact in the right breast at the 1:00 location marking site of biopsy-proven radial scar. No associated suspicious enhancement. Recommend\par appropriate surgical management. 2.1 cm oval T2 bright lesion in the liver, probable cyst or hemangioma.\par Recommend dedicated liver ultrasound for further evaluation.\par \par 8/28/17 MRI Core biopsy right breast lower outer quadrant --> DCIS, solid pattern, high grade atypia, focal necrosis. ER/NC pending\par Core biopsy left breast central inner --> mild proliferative fibrocystic change\par \par Treatment Summary\par 9/21/17 port placement\par 9/20/17 - 1/3/18 TCHP x 6 cycles (9/30/17 - 1/03/18\par 5/03/18 - 11/26/18 Trastuzumab (Herceptin) X every 3 weeks, to complete 52 weeks.\par She had breast reconstruction on 8/14/18 with nipple reconstruction.\par S/p port removal on 6/19/19.\par \par 3/14/18 - She underwent right skin sparing mastectomy with SNL biopsy and left risk reduction mastectomy with BUNNY reconstruction.\par 1- Left breast, mastectomy- Fibrocystic changes\par 2- Right sentinel lymph node, excision- Three negative lymph nodes (0/3)\par 3- Right sentinel lymph node 2, excision- One negative lymph node (0/1)\par 4- Right breast, mastectomy- No residual invasive carcinoma (s/p neoadjuvant treatment)- No residual DCIS (s/p neoadjuvant treatment)\par  Pathologic staging: ypT0, ypN0\par \par . \par \par Disease: right breast cancer \par Pathology: HER2 positive \par TNM stage: T2 \par \par \par Interval History: Patient returns for follow up. \par She had a R breast US on 10/20/20 for palpable R breast lump which showed 3.5 cm mass at 4:00, 9 cmfn. \par 10/26/20 US guided biopsy R breast 4:00 --Breast, right, 4 o'clock, 9 cm FN, ultrasound guided core biopsy\par - Invasive poorly differentiated ductal carcinoma withfocal necrosis and foci with micropapillary features\par - Philadelphia score 8/9 (3 + 3+ 2 )\par - Invasive tumor measures at least 1.5 cm\par -ER 0%, NC 0%, HER2 3+\par \par On 11/6/20 she underwent right mastectomy flap excision - IDC, poorly differentiated with necrosis, involvement of dermal skin and retraction artifact, 3.8 x 3.1 x 3.1\par -ER 0%, NC 0%, HER2 3+. \par  [de-identified] : Patient is here for a follow up visit. Patient completed breast radiation 2/25/21. Patient is receiving Kadcyla.\par Patient reports intermittent non-productive cough while deep breathing. Patient denies shortness of breath and denies progressive cough. Patient reports resolution of dysgeusia post radiation. Patient reports since completing radiation saliva has been "gooey and thick." Patient reports pretzels dries saliva. Patient additionally reports 2-3 days after Kadcyla experiencing nausea, patient reports nausea self subsides. Patient denies abdominal pain, vomiting, diarrhea. Patient reports complete resolution of Herpes Zoster that was diagnosed in January 2021 along left breast incision line; no wound, no rash, no vesicles noted. Patient reports intermittent rhinorrhea since starting Kadcyla. Patient denies fever. Patient denies pain. Patient reports good performance status, good appetite, weight is stable. \par 3/25/21: Echocardiogram LV EF 60-65% which is unchanged since previous echocardiogram on 12/21/20

## 2021-04-13 NOTE — PHYSICAL EXAM
[Fully active, able to carry on all pre-disease performance without restriction] : Status 0 - Fully active, able to carry on all pre-disease performance without restriction [Normal] : grossly intact [de-identified] : bilateral reconstructed breasts, no nodularity

## 2021-04-27 ENCOUNTER — RESULT REVIEW (OUTPATIENT)
Age: 61
End: 2021-04-27

## 2021-04-27 ENCOUNTER — APPOINTMENT (OUTPATIENT)
Age: 61
End: 2021-04-27

## 2021-04-27 LAB
BASOPHILS # BLD AUTO: 0.1 K/UL — SIGNIFICANT CHANGE UP (ref 0–0.2)
BASOPHILS NFR BLD AUTO: 1.5 % — SIGNIFICANT CHANGE UP (ref 0–2)
EOSINOPHIL # BLD AUTO: 0.2 K/UL — SIGNIFICANT CHANGE UP (ref 0–0.5)
EOSINOPHIL NFR BLD AUTO: 4.1 % — SIGNIFICANT CHANGE UP (ref 0–6)
HCT VFR BLD CALC: 42.6 % — SIGNIFICANT CHANGE UP (ref 34.5–45)
HGB BLD-MCNC: 13.7 G/DL — SIGNIFICANT CHANGE UP (ref 11.5–15.5)
LYMPHOCYTES # BLD AUTO: 0.7 K/UL — LOW (ref 1–3.3)
LYMPHOCYTES # BLD AUTO: 13.1 % — SIGNIFICANT CHANGE UP (ref 13–44)
MCHC RBC-ENTMCNC: 27.4 PG — SIGNIFICANT CHANGE UP (ref 27–34)
MCHC RBC-ENTMCNC: 32.1 G/DL — SIGNIFICANT CHANGE UP (ref 32–36)
MCV RBC AUTO: 85.3 FL — SIGNIFICANT CHANGE UP (ref 80–100)
MONOCYTES # BLD AUTO: 0.5 K/UL — SIGNIFICANT CHANGE UP (ref 0–0.9)
MONOCYTES NFR BLD AUTO: 9.5 % — SIGNIFICANT CHANGE UP (ref 2–14)
NEUTROPHILS # BLD AUTO: 4 K/UL — SIGNIFICANT CHANGE UP (ref 1.8–7.4)
NEUTROPHILS NFR BLD AUTO: 71.8 % — SIGNIFICANT CHANGE UP (ref 43–77)
PLATELET # BLD AUTO: 171 K/UL — SIGNIFICANT CHANGE UP (ref 150–400)
RBC # BLD: 5 M/UL — SIGNIFICANT CHANGE UP (ref 3.8–5.2)
RBC # FLD: 13.1 % — SIGNIFICANT CHANGE UP (ref 10.3–14.5)
WBC # BLD: 5.6 K/UL — SIGNIFICANT CHANGE UP (ref 3.8–10.5)
WBC # FLD AUTO: 5.6 K/UL — SIGNIFICANT CHANGE UP (ref 3.8–10.5)

## 2021-05-14 ENCOUNTER — OUTPATIENT (OUTPATIENT)
Dept: OUTPATIENT SERVICES | Facility: HOSPITAL | Age: 61
LOS: 1 days | Discharge: HOME | End: 2021-05-14

## 2021-05-14 DIAGNOSIS — Z98.890 OTHER SPECIFIED POSTPROCEDURAL STATES: Chronic | ICD-10-CM

## 2021-05-14 DIAGNOSIS — C50.919 MALIGNANT NEOPLASM OF UNSPECIFIED SITE OF UNSPECIFIED FEMALE BREAST: ICD-10-CM

## 2021-05-14 DIAGNOSIS — T82.9XXA UNSPECIFIED COMPLICATION OF CARDIAC AND VASCULAR PROSTHETIC DEVICE, IMPLANT AND GRAFT, INITIAL ENCOUNTER: Chronic | ICD-10-CM

## 2021-05-14 DIAGNOSIS — Z90.710 ACQUIRED ABSENCE OF BOTH CERVIX AND UTERUS: Chronic | ICD-10-CM

## 2021-05-14 DIAGNOSIS — Z98.891 HISTORY OF UTERINE SCAR FROM PREVIOUS SURGERY: Chronic | ICD-10-CM

## 2021-05-14 DIAGNOSIS — Z95.828 PRESENCE OF OTHER VASCULAR IMPLANTS AND GRAFTS: Chronic | ICD-10-CM

## 2021-05-14 DIAGNOSIS — Z90.13 ACQUIRED ABSENCE OF BILATERAL BREASTS AND NIPPLES: Chronic | ICD-10-CM

## 2021-05-18 ENCOUNTER — LABORATORY RESULT (OUTPATIENT)
Age: 61
End: 2021-05-18

## 2021-05-18 ENCOUNTER — APPOINTMENT (OUTPATIENT)
Age: 61
End: 2021-05-18

## 2021-05-18 ENCOUNTER — RESULT REVIEW (OUTPATIENT)
Age: 61
End: 2021-05-18

## 2021-05-18 LAB
BASOPHILS # BLD AUTO: 0.1 K/UL — SIGNIFICANT CHANGE UP (ref 0–0.2)
BASOPHILS NFR BLD AUTO: 1.2 % — SIGNIFICANT CHANGE UP (ref 0–2)
EOSINOPHIL # BLD AUTO: 0.2 K/UL — SIGNIFICANT CHANGE UP (ref 0–0.5)
EOSINOPHIL NFR BLD AUTO: 4 % — SIGNIFICANT CHANGE UP (ref 0–6)
HCT VFR BLD CALC: 41.2 % — SIGNIFICANT CHANGE UP (ref 34.5–45)
HGB BLD-MCNC: 13.2 G/DL — SIGNIFICANT CHANGE UP (ref 11.5–15.5)
LYMPHOCYTES # BLD AUTO: 0.6 K/UL — LOW (ref 1–3.3)
LYMPHOCYTES # BLD AUTO: 12.5 % — LOW (ref 13–44)
MCHC RBC-ENTMCNC: 27.5 PG — SIGNIFICANT CHANGE UP (ref 27–34)
MCHC RBC-ENTMCNC: 32 G/DL — SIGNIFICANT CHANGE UP (ref 32–36)
MCV RBC AUTO: 86.2 FL — SIGNIFICANT CHANGE UP (ref 80–100)
MONOCYTES # BLD AUTO: 0.5 K/UL — SIGNIFICANT CHANGE UP (ref 0–0.9)
MONOCYTES NFR BLD AUTO: 10.4 % — SIGNIFICANT CHANGE UP (ref 2–14)
NEUTROPHILS # BLD AUTO: 3.7 K/UL — SIGNIFICANT CHANGE UP (ref 1.8–7.4)
NEUTROPHILS NFR BLD AUTO: 71.9 % — SIGNIFICANT CHANGE UP (ref 43–77)
PLATELET # BLD AUTO: 129 K/UL — LOW (ref 150–400)
RBC # BLD: 4.78 M/UL — SIGNIFICANT CHANGE UP (ref 3.8–5.2)
RBC # FLD: 13 % — SIGNIFICANT CHANGE UP (ref 10.3–14.5)
WBC # BLD: 5.2 K/UL — SIGNIFICANT CHANGE UP (ref 3.8–10.5)
WBC # FLD AUTO: 5.2 K/UL — SIGNIFICANT CHANGE UP (ref 3.8–10.5)

## 2021-05-19 DIAGNOSIS — Z51.11 ENCOUNTER FOR ANTINEOPLASTIC CHEMOTHERAPY: ICD-10-CM

## 2021-06-10 ENCOUNTER — OUTPATIENT (OUTPATIENT)
Dept: OUTPATIENT SERVICES | Facility: HOSPITAL | Age: 61
LOS: 1 days | Discharge: HOME | End: 2021-06-10

## 2021-06-10 DIAGNOSIS — Z90.710 ACQUIRED ABSENCE OF BOTH CERVIX AND UTERUS: Chronic | ICD-10-CM

## 2021-06-10 DIAGNOSIS — Z90.13 ACQUIRED ABSENCE OF BILATERAL BREASTS AND NIPPLES: Chronic | ICD-10-CM

## 2021-06-10 DIAGNOSIS — T82.9XXA UNSPECIFIED COMPLICATION OF CARDIAC AND VASCULAR PROSTHETIC DEVICE, IMPLANT AND GRAFT, INITIAL ENCOUNTER: Chronic | ICD-10-CM

## 2021-06-10 DIAGNOSIS — Z95.828 PRESENCE OF OTHER VASCULAR IMPLANTS AND GRAFTS: Chronic | ICD-10-CM

## 2021-06-10 DIAGNOSIS — Z98.891 HISTORY OF UTERINE SCAR FROM PREVIOUS SURGERY: Chronic | ICD-10-CM

## 2021-06-10 DIAGNOSIS — C50.919 MALIGNANT NEOPLASM OF UNSPECIFIED SITE OF UNSPECIFIED FEMALE BREAST: ICD-10-CM

## 2021-06-10 DIAGNOSIS — Z98.890 OTHER SPECIFIED POSTPROCEDURAL STATES: Chronic | ICD-10-CM

## 2021-06-14 ENCOUNTER — RESULT REVIEW (OUTPATIENT)
Age: 61
End: 2021-06-14

## 2021-06-14 ENCOUNTER — APPOINTMENT (OUTPATIENT)
Age: 61
End: 2021-06-14

## 2021-06-14 ENCOUNTER — LABORATORY RESULT (OUTPATIENT)
Age: 61
End: 2021-06-14

## 2021-06-14 LAB
BASOPHILS # BLD AUTO: 0.1 K/UL — SIGNIFICANT CHANGE UP (ref 0–0.2)
BASOPHILS NFR BLD AUTO: 2.1 % — HIGH (ref 0–2)
EOSINOPHIL # BLD AUTO: 0.2 K/UL — SIGNIFICANT CHANGE UP (ref 0–0.5)
EOSINOPHIL NFR BLD AUTO: 5.1 % — SIGNIFICANT CHANGE UP (ref 0–6)
HCT VFR BLD CALC: 41.5 % — SIGNIFICANT CHANGE UP (ref 34.5–45)
HGB BLD-MCNC: 13.5 G/DL — SIGNIFICANT CHANGE UP (ref 11.5–15.5)
LYMPHOCYTES # BLD AUTO: 0.7 K/UL — LOW (ref 1–3.3)
LYMPHOCYTES # BLD AUTO: 15.8 % — SIGNIFICANT CHANGE UP (ref 13–44)
MCHC RBC-ENTMCNC: 27.6 PG — SIGNIFICANT CHANGE UP (ref 27–34)
MCHC RBC-ENTMCNC: 32.6 G/DL — SIGNIFICANT CHANGE UP (ref 32–36)
MCV RBC AUTO: 84.6 FL — SIGNIFICANT CHANGE UP (ref 80–100)
MONOCYTES # BLD AUTO: 0.4 K/UL — SIGNIFICANT CHANGE UP (ref 0–0.9)
MONOCYTES NFR BLD AUTO: 9.5 % — SIGNIFICANT CHANGE UP (ref 2–14)
NEUTROPHILS # BLD AUTO: 2.8 K/UL — SIGNIFICANT CHANGE UP (ref 1.8–7.4)
NEUTROPHILS NFR BLD AUTO: 67.5 % — SIGNIFICANT CHANGE UP (ref 43–77)
PLATELET # BLD AUTO: 121 K/UL — LOW (ref 150–400)
RBC # BLD: 4.9 M/UL — SIGNIFICANT CHANGE UP (ref 3.8–5.2)
RBC # FLD: 13.1 % — SIGNIFICANT CHANGE UP (ref 10.3–14.5)
WBC # BLD: 4.1 K/UL — SIGNIFICANT CHANGE UP (ref 3.8–10.5)
WBC # FLD AUTO: 4.1 K/UL — SIGNIFICANT CHANGE UP (ref 3.8–10.5)

## 2021-06-15 DIAGNOSIS — Z51.11 ENCOUNTER FOR ANTINEOPLASTIC CHEMOTHERAPY: ICD-10-CM

## 2021-06-22 ENCOUNTER — APPOINTMENT (OUTPATIENT)
Dept: CARDIOLOGY | Facility: CLINIC | Age: 61
End: 2021-06-22
Payer: COMMERCIAL

## 2021-06-22 PROCEDURE — 93356 MYOCRD STRAIN IMG SPCKL TRCK: CPT

## 2021-06-22 PROCEDURE — 93306 TTE W/DOPPLER COMPLETE: CPT

## 2021-06-22 PROCEDURE — 76376 3D RENDER W/INTRP POSTPROCES: CPT

## 2021-06-24 ENCOUNTER — APPOINTMENT (OUTPATIENT)
Dept: SURGERY | Facility: CLINIC | Age: 61
End: 2021-06-24
Payer: COMMERCIAL

## 2021-06-24 VITALS
BODY MASS INDEX: 34.18 KG/M2 | SYSTOLIC BLOOD PRESSURE: 122 MMHG | HEIGHT: 61 IN | WEIGHT: 181.04 LBS | TEMPERATURE: 97.8 F | HEART RATE: 91 BPM | OXYGEN SATURATION: 96 % | DIASTOLIC BLOOD PRESSURE: 71 MMHG

## 2021-06-24 PROCEDURE — 99213 OFFICE O/P EST LOW 20 MIN: CPT

## 2021-06-28 NOTE — HISTORY OF PRESENT ILLNESS
[FreeTextEntry1] : I had the pleasure of seeing Monisha Young in the office today for a follow up visit.\par \par Monisha is a diana 61 yo postmenopausal female. She is s/p bilateral mastectomy with right SLNB for [id number] positive cancer after receiving neoadjuvant chemotherapy 3/14/2018.\par \par She underwent right breast US on 10/20/2020 after palpable a right breast lump.  US demonstrated a 3.5 cm mass in the right breast 4:00 9 cmfn with recommendation of biopsy.  Biopsy was performed on 10/26/20 and demonstrated right breast IDC grade 3 ER 0% ID 0% Her2 positive.  CT C/A/P was preformed and demonstrated the 3.1 cm mass and borderline right axilla lymph node.  She was then sent for right axilla US and US came back with benign lymph node.  She went to the OR on 11/6/2020 of right breast reexcision of mastectomy.  Final pathology demonstrated 3.8 cm with involvement of dermal skin and retraction.  Lymphovascular invasion present.  ER 0% ID 0% Her2 positive.\par \par Genetics: color: Negative\par PMRT- 3/2021\par  \par 3/14/2018 [facility]\par Left breast, mastectomy\par -Fibrocystic changes\par Right sentinel lymph node, excision\par -Three negative lymph nodes 0/3\par Right sentinel lymph node 2, excision \par -One negative lymph node 0/1\par Right breast, mastectomy\par -No residual invasive carcinoma s/p neoadjuvant treatment\par No residual DCIS s/p neoadjuvant treatment\par Biopsy site changes\par Radial scar\par Proliferative fibrocystic changes\par Pathologic staging: ypT0, ypNo\par \par 10/20/2020  US breast complete RT\par Impression:  No suspicious mass at the area of the lump/  Ultrasound guided biopsy recommended.\par \par 10/26/2020  Pathology\par breast right 4 o'clcok, 9 cmfn, ultrasound guided core biopsy:  Invasive poorly differentiated ductal carcinoma with focal necrosis and foci with micropapillary features.  Colp score 8/9.  Invasive tumor measures at least 1.5 cm.  ER 0% ID 0 % Her2 negative.\par \par 11/2/2020  CT Chest/Abdomen/Pelvis\par IMPRESSION:Right breast mass corresponding to known carcinoma recurrence.\par Borderline right axillary lymph node.No metastatic lesion otherwise visualized.\par \par 11/2/2020  NM Bone IMG Whole Body\par Impression:  No radionuclide evidence of osseous metastases.\par \par 11/3/2020  US breast limited RT\par Impression:  Benign appearing right axillary lymph nodes.  Recommend surgical/oncologic management.  BIRADS 2 Benign findings.\par \par 11/6/2020  Surgical Pathology\par 1.  tissue (right breast mastectomy flap, medial magin):  Negative for carcinoma.\par 2.  Tissue (rightmastectomy flap):  Infiltrating ductal carcinoma, poorly differentiated with necrosis, involvement of dermal skin and retraction artifact, measuring 3.8 x 3.1 x 3.1 cm (see comment).  Chronic inflammation, foreign body giant cell reaction, organizing fat necrosis and fibrosis consitent with surgical changes.\par 3.  Tissue (right breast mastectomy flap, superior scar tissue):  Lymphovascular invasion present.  Patchy chronic inflammation, foreign body giant cell reaction, organizing fat necrosis and fibrosis consistent with surgical changes.\par 4.  Tissue (right breast mastectomy flap, medial margin):  Negative for carcinoma.\par 5.  Tissue ( right breast mastectomy flap, superior margin):  Negative for carcinoma.\par 6.  Tissue (right breast mastectomy flap, lateral margin):  Negative for carcinoma.\par 7.  Tissue (right breast mastectomy flap, inferior margin):  Negative for carcinoma.\par 8.  Tissue (right breast mastectomy flap, deep posterior margin):  Negative for carcinoma.\par 9.  Tissue ( right breast mastectomy, additional medial skin margin):  Negative for carcinoma\par \par We reviewed clinical breast exam and she is well healed.  She had PMRT and completed on 3/2021.  Since radiation she reports right arm decrease in ROM.  Will send her to physical therapy.  She will be following up with Dr. Reddy in 2 weeks.  Recommendation for follow up in 4 months.\par \par She understands and agrees with plan.  All questions answered.\par \par

## 2021-06-28 NOTE — ASSESSMENT
[FreeTextEntry1] : 61 yo with ipsilateral tumor recurrence (rpT2N0 grade 3 ER-ID-HER2+), s/p right mastectomy flap excision on 11/6/2020.  She completed PMRT 3/2021.  She was originally diagnosed with IDC right breast in 8/2017 s/p neoadjuvant chemotherapy followed by bilateral mastectomy with right SLNB and BUNNY on 3/14/58455 showing complete response and completed 1 year of Herceptin.  Recommendation for:\par 1.  PT- Decreased ROM of right arm s/p radiation\par 2.  Follow up with Dr. Reddy 7/6/2021\par 3.  Follow up with Dr. Celeste 7/13/\par 4.  Follow up in 4 months

## 2021-06-28 NOTE — PHYSICAL EXAM
[Normocephalic] : normocephalic [Atraumatic] : atraumatic [PERRL] : pupils equal, round and reactive to light [Sclera nonicteric] : sclera nonicteric [Supple] : supple [No Supraclavicular Adenopathy] : no supraclavicular adenopathy [Examined in the supine and seated position] : examined in the supine and seated position [No dominant masses] : no dominant masses in right breast  [No dominant masses] : no dominant masses left breast [No Nipple Retraction] : no left nipple retraction [No Nipple Discharge] : no left nipple discharge [No Axillary Lymphadenopathy] : no left axillary lymphadenopathy [No Edema] : no edema [No Rashes] : no rashes [No Ulceration] : no ulceration [de-identified] : flap health and well integrated  no evidence of hematoma seroma or infection\par  [de-identified] : flap health and well integrated  no evidence of hematoma seroma or infection\par

## 2021-07-04 ENCOUNTER — NON-APPOINTMENT (OUTPATIENT)
Age: 61
End: 2021-07-04

## 2021-07-06 ENCOUNTER — LABORATORY RESULT (OUTPATIENT)
Age: 61
End: 2021-07-06

## 2021-07-06 ENCOUNTER — APPOINTMENT (OUTPATIENT)
Age: 61
End: 2021-07-06

## 2021-07-06 ENCOUNTER — APPOINTMENT (OUTPATIENT)
Dept: HEMATOLOGY ONCOLOGY | Facility: CLINIC | Age: 61
End: 2021-07-06
Payer: COMMERCIAL

## 2021-07-06 ENCOUNTER — RESULT REVIEW (OUTPATIENT)
Age: 61
End: 2021-07-06

## 2021-07-06 VITALS
BODY MASS INDEX: 34.02 KG/M2 | WEIGHT: 180.19 LBS | OXYGEN SATURATION: 97 % | HEART RATE: 91 BPM | SYSTOLIC BLOOD PRESSURE: 121 MMHG | HEIGHT: 61 IN | RESPIRATION RATE: 18 BRPM | DIASTOLIC BLOOD PRESSURE: 81 MMHG | TEMPERATURE: 97.2 F

## 2021-07-06 DIAGNOSIS — Z51.11 ENCOUNTER FOR ANTINEOPLASTIC CHEMOTHERAPY: ICD-10-CM

## 2021-07-06 LAB
BASOPHILS # BLD AUTO: 0.1 K/UL — SIGNIFICANT CHANGE UP (ref 0–0.2)
BASOPHILS NFR BLD AUTO: 1.2 % — SIGNIFICANT CHANGE UP (ref 0–2)
EOSINOPHIL # BLD AUTO: 0.1 K/UL — SIGNIFICANT CHANGE UP (ref 0–0.5)
EOSINOPHIL NFR BLD AUTO: 2.6 % — SIGNIFICANT CHANGE UP (ref 0–6)
HCT VFR BLD CALC: 42.3 % — SIGNIFICANT CHANGE UP (ref 34.5–45)
HGB BLD-MCNC: 13.5 G/DL — SIGNIFICANT CHANGE UP (ref 11.5–15.5)
LYMPHOCYTES # BLD AUTO: 0.8 K/UL — LOW (ref 1–3.3)
LYMPHOCYTES # BLD AUTO: 17.1 % — SIGNIFICANT CHANGE UP (ref 13–44)
MCHC RBC-ENTMCNC: 27.1 PG — SIGNIFICANT CHANGE UP (ref 27–34)
MCHC RBC-ENTMCNC: 31.8 G/DL — LOW (ref 32–36)
MCV RBC AUTO: 85.2 FL — SIGNIFICANT CHANGE UP (ref 80–100)
MONOCYTES # BLD AUTO: 0.2 K/UL — SIGNIFICANT CHANGE UP (ref 0–0.9)
MONOCYTES NFR BLD AUTO: 4.5 % — SIGNIFICANT CHANGE UP (ref 2–14)
NEUTROPHILS # BLD AUTO: 3.5 K/UL — SIGNIFICANT CHANGE UP (ref 1.8–7.4)
NEUTROPHILS NFR BLD AUTO: 74.6 % — SIGNIFICANT CHANGE UP (ref 43–77)
PLATELET # BLD AUTO: 110 K/UL — LOW (ref 150–400)
RBC # BLD: 4.97 M/UL — SIGNIFICANT CHANGE UP (ref 3.8–5.2)
RBC # FLD: 13.5 % — SIGNIFICANT CHANGE UP (ref 10.3–14.5)
WBC # BLD: 4.7 K/UL — SIGNIFICANT CHANGE UP (ref 3.8–10.5)
WBC # FLD AUTO: 4.7 K/UL — SIGNIFICANT CHANGE UP (ref 3.8–10.5)

## 2021-07-06 PROCEDURE — 99215 OFFICE O/P EST HI 40 MIN: CPT

## 2021-07-06 RX ORDER — LOSARTAN POTASSIUM 100 MG/1
100 TABLET, FILM COATED ORAL
Qty: 30 | Refills: 0 | Status: ACTIVE | COMMUNITY
Start: 2021-04-28

## 2021-07-06 RX ORDER — AMLODIPINE BESYLATE 5 MG/1
5 TABLET ORAL
Refills: 0 | Status: DISCONTINUED | COMMUNITY
Start: 2021-04-13 | End: 2021-07-06

## 2021-07-06 RX ORDER — LOSARTAN POTASSIUM 50 MG/1
50 TABLET, FILM COATED ORAL DAILY
Refills: 0 | Status: DISCONTINUED | COMMUNITY
End: 2021-07-06

## 2021-07-07 PROBLEM — Z51.11 ENCOUNTER FOR ANTINEOPLASTIC CHEMOTHERAPY: Status: ACTIVE | Noted: 2021-07-07

## 2021-07-07 NOTE — HISTORY OF PRESENT ILLNESS
[de-identified] : \par Ms. Young was diagnosed with HER2 positive right breast cancer at age 56 in August 2017.\par \par She initially presented with right nipple yellowish discharge. Subsequent mammogram and ultrasound on 7/27/17 showed new spiculated mass in inner central posterior right breast with sonographic confirmation of suspicious mass at 3-4:00 region, and an indeterminate hypoechoic nodule at 1:00 right breast.\par \par On 8/3/17 she had right breast core biopsy of 3-4:00 mass --> pathology showed invasive poorly differentiated ductal carcinoma, Enzo grade 8/9, 0.9 cm--> ER-, FL-, HER2 IHC 3+ (positive)\par Right breast 1:00 --> fibrocystic change, proliferative type with nodular sclerosing adenosis\par \par 8/21/17 MRI breast - Biopsy clip and 3.8 cm area of irregular non mass enhancement in the right breast at the 3-4:00 location posteriorly, corresponding to recently\par diagnosed invasive carcinoma. Additional irregular areas of enhancement in the right breast at the 2-3:00 location and 8-9:00 location, suspicious for additional disease. Representative MR guided core biopsy of enhancement at the 8-9:00 location can be performed to evaluate for multicentric disease, if it would change\par management. 8 mm enhancing nodule demonstrating washout kinetics in the upper slightly inner mid left breast. MR guided core biopsy is advised.\par Clip artifact in the right breast at the 1:00 location marking site of biopsy-proven radial scar. No associated suspicious enhancement. Recommend\par appropriate surgical management. 2.1 cm oval T2 bright lesion in the liver, probable cyst or hemangioma.\par Recommend dedicated liver ultrasound for further evaluation.\par \par 8/28/17 MRI Core biopsy right breast lower outer quadrant --> DCIS, solid pattern, high grade atypia, focal necrosis. ER/FL pending\par Core biopsy left breast central inner --> mild proliferative fibrocystic change\par \par Treatment Summary\par 9/21/17 port placement\par 9/20/17 - 1/3/18 TCHP x 6 cycles (9/30/17 - 1/03/18\par 5/03/18 - 11/26/18 Trastuzumab (Herceptin) X every 3 weeks, to complete 52 weeks.\par She had breast reconstruction on 8/14/18 with nipple reconstruction.\par S/p port removal on 6/19/19.\par \par 3/14/18 - She underwent right skin sparing mastectomy with SNL biopsy and left risk reduction mastectomy with BUNNY reconstruction.\par 1- Left breast, mastectomy- Fibrocystic changes\par 2- Right sentinel lymph node, excision- Three negative lymph nodes (0/3)\par 3- Right sentinel lymph node 2, excision- One negative lymph node (0/1)\par 4- Right breast, mastectomy- No residual invasive carcinoma (s/p neoadjuvant treatment)- No residual DCIS (s/p neoadjuvant treatment)\par  Pathologic staging: ypT0, ypN0\par \par . \par \par Disease: right breast cancer \par Pathology: HER2 positive \par TNM stage: T2 \par \par \par Interval History: Patient returns for follow up. \par She had a R breast US on 10/20/20 for palpable R breast lump which showed 3.5 cm mass at 4:00, 9 cmfn. \par 10/26/20 US guided biopsy R breast 4:00 --Breast, right, 4 o'clock, 9 cm FN, ultrasound guided core biopsy\par - Invasive poorly differentiated ductal carcinoma withfocal necrosis and foci with micropapillary features\par - Alvarado score 8/9 (3 + 3+ 2 )\par - Invasive tumor measures at least 1.5 cm\par -ER 0%, FL 0%, HER2 3+\par \par On 11/6/20 she underwent right mastectomy flap excision - IDC, poorly differentiated with necrosis, involvement of dermal skin and retraction artifact, 3.8 x 3.1 x 3.1\par -ER 0%, FL 0%, HER2 3+. \par  [de-identified] : Patient is here for a follow up visit. Patient completed breast radiation 2/25/21. Receiving Kadcyla last treatment 6/14/21.\par Gradual decrease ROM of right upper extremity, planning to start PT \par Denies any pain \par No headache. No dizziness.

## 2021-07-07 NOTE — ASSESSMENT
[FreeTextEntry1] : 60 year old postmenopausal female with stage IIA (T2) ER-/IA-/HER2 positive right breast cancer. S/p 6 cycles of neoadjuvant TCHP followed by bilateral mastectomy + BUNNY on 3/14/18, ypT0 ypN0 (pathologic complete response). Completed 1 year of Herceptin on 11/26/18. \par She developed R mastectomy flap recurrence in 10/2020, s/p excision on 11/6/20 -> 3.8 cm poorly differentiated IDC, ER-/IA-/HER2 positive. Completed breast RT 2/25/21.\par She's on adjuvant Kadcyla as of 1/11/21. \par \par Tolerating Kadcyla.  Decrease ROM of RUE, planning to start PT. \par REviwed 6/14/21 CMP - AST/ALT elevated at 76/54, ALk phos 164\par \par \par Plan: \par -Elevated liver enzymes: will reduce Kadcyla dose for today to 3 mg/kg; pending liver enzyme results from today, if normal will resume previous dosage for future treatments \par -Reviewed 6/22/21 Echocardiogram LV EF 60-65% which is unchanged since previous echocardiogram on 3/25/21, continue echo q 3 months\par -consider neratinib post kadcyla\par -Follow up in 6 weeks

## 2021-07-13 ENCOUNTER — NON-APPOINTMENT (OUTPATIENT)
Age: 61
End: 2021-07-13

## 2021-07-13 ENCOUNTER — APPOINTMENT (OUTPATIENT)
Dept: RADIATION ONCOLOGY | Facility: CLINIC | Age: 61
End: 2021-07-13
Payer: COMMERCIAL

## 2021-07-13 VITALS
OXYGEN SATURATION: 97 % | RESPIRATION RATE: 16 BRPM | BODY MASS INDEX: 33.61 KG/M2 | HEIGHT: 61 IN | DIASTOLIC BLOOD PRESSURE: 80 MMHG | HEART RATE: 90 BPM | WEIGHT: 178 LBS | SYSTOLIC BLOOD PRESSURE: 134 MMHG

## 2021-07-13 PROCEDURE — 99214 OFFICE O/P EST MOD 30 MIN: CPT

## 2021-07-13 NOTE — PHYSICAL EXAM
[Sclera] : the sclera and conjunctiva were normal [Extraocular Movements] : extraocular movements were intact [Breast Palpation Mass] : no palpable masses [No UE Edema] : there is no upper extremity edema [Bowel Sounds] : normal bowel sounds [Abdomen Soft] : soft [Nondistended] : nondistended [Normal] : no palpable adenopathy [Cervical Lymph Nodes Enlarged Anterior Bilaterally] : anterior cervical [Supraclavicular Lymph Nodes Enlarged Bilaterally] : supraclavicular [Axillary Lymph Nodes Enlarged Bilaterally] : axillary [Motor Tone] : muscle strength and tone were normal [de-identified] : mild hyperpigmentation of the right chest, post-treatment induration greatest in right LIQ [de-identified] : right shoulder ROM ~120'

## 2021-07-13 NOTE — VITALS
[Maximal Pain Intensity: 1/10] : 1/10 [Least Pain Intensity: 1/10] : 1/10 [Pain Description/Quality: ___] : Pain description/quality: [unfilled] [Pain Duration: ___] : Pain duration: [unfilled] [Pain Location: ___] : Pain Location: [unfilled] [Pain Interferes with ADLs] : Pain interferes with activities of daily living. [NoTreatment Scheduled] : no treatment scheduled [80: Normal activity with effort; some signs or symptoms of disease.] : 80: Normal activity with effort; some signs or symptoms of disease.

## 2021-07-13 NOTE — HISTORY OF PRESENT ILLNESS
[FreeTextEntry1] : 60 year old woman with ipsilateral tumor recurrence (rpT2 N0, grade 3, ER-/IL-, HER2+), s/p right mastectomy flap excision on 11/6/2020, completed post-mastectomy radiation therapy on 2/25/21. Continues on Kadcyla.\par \par Reports that Kadcyla dose has been reduced due to elevated liver enzymes.  Considering neratinib following Kadcyla.\par She has noted some decreased ROM of the right shoulder, with tightness across the upper right chest.  She is scheduled to begin PT.  Denies UE lymphedema.  Denies dermatitis or chest wall pain.\par \par Dr. Reddy\par Dr. Gutierrez\par

## 2021-07-13 NOTE — REVIEW OF SYSTEMS
[Negative] : Allergic/Immunologic [Edema Limbs: Grade 0] : Edema Limbs: Grade 0  [Fatigue: Grade 0] : Fatigue: Grade 0 [Localized Edema: Grade 0] : Localized Edema: Grade 0  [Neck Edema: Grade 0] : Neck Edema: Grade 0 [Breast Pain: Grade 1 - Mild pain] : Breast Pain: Grade 1 - Mild pain [FreeTextEntry9] : decreased ROM right shoulder [Pruritus: Grade 0] : Pruritus: Grade 0 [Skin Atrophy: Grade 0] : Skin Atrophy: Grade 0 [Skin Hyperpigmentation: Grade 1 - Hyperpigmentation covering <10% BSA; no psychosocial impact] : Skin Hyperpigmentation: Grade 1 - Hyperpigmentation covering <10% BSA; no psychosocial impact [Skin Induration: Grade 1 - Mild induration, able to move skin parallel to plane (sliding) and perpendicular to skin (pinching up)] : Skin Induration: Grade 1 - Mild induration, able to move skin parallel to plane (sliding) and perpendicular to skin (pinching up) [Dermatitis Radiation: Grade 0] : Dermatitis Radiation: Grade 0

## 2021-07-26 ENCOUNTER — OUTPATIENT (OUTPATIENT)
Dept: OUTPATIENT SERVICES | Facility: HOSPITAL | Age: 61
LOS: 1 days | Discharge: HOME | End: 2021-07-26

## 2021-07-26 DIAGNOSIS — Z98.891 HISTORY OF UTERINE SCAR FROM PREVIOUS SURGERY: Chronic | ICD-10-CM

## 2021-07-26 DIAGNOSIS — Z95.828 PRESENCE OF OTHER VASCULAR IMPLANTS AND GRAFTS: Chronic | ICD-10-CM

## 2021-07-26 DIAGNOSIS — Z90.710 ACQUIRED ABSENCE OF BOTH CERVIX AND UTERUS: Chronic | ICD-10-CM

## 2021-07-26 DIAGNOSIS — Z90.13 ACQUIRED ABSENCE OF BILATERAL BREASTS AND NIPPLES: Chronic | ICD-10-CM

## 2021-07-26 DIAGNOSIS — C50.919 MALIGNANT NEOPLASM OF UNSPECIFIED SITE OF UNSPECIFIED FEMALE BREAST: ICD-10-CM

## 2021-07-26 DIAGNOSIS — Z98.890 OTHER SPECIFIED POSTPROCEDURAL STATES: Chronic | ICD-10-CM

## 2021-07-26 DIAGNOSIS — T82.9XXA UNSPECIFIED COMPLICATION OF CARDIAC AND VASCULAR PROSTHETIC DEVICE, IMPLANT AND GRAFT, INITIAL ENCOUNTER: Chronic | ICD-10-CM

## 2021-07-27 ENCOUNTER — RESULT REVIEW (OUTPATIENT)
Age: 61
End: 2021-07-27

## 2021-07-27 ENCOUNTER — APPOINTMENT (OUTPATIENT)
Age: 61
End: 2021-07-27

## 2021-07-27 ENCOUNTER — LABORATORY RESULT (OUTPATIENT)
Age: 61
End: 2021-07-27

## 2021-07-27 LAB
BASOPHILS # BLD AUTO: 0.1 K/UL — SIGNIFICANT CHANGE UP (ref 0–0.2)
BASOPHILS NFR BLD AUTO: 1.8 % — SIGNIFICANT CHANGE UP (ref 0–2)
EOSINOPHIL # BLD AUTO: 0.2 K/UL — SIGNIFICANT CHANGE UP (ref 0–0.5)
EOSINOPHIL NFR BLD AUTO: 4.4 % — SIGNIFICANT CHANGE UP (ref 0–6)
HCT VFR BLD CALC: 42.4 % — SIGNIFICANT CHANGE UP (ref 34.5–45)
HGB BLD-MCNC: 13.4 G/DL — SIGNIFICANT CHANGE UP (ref 11.5–15.5)
LYMPHOCYTES # BLD AUTO: 0.6 K/UL — LOW (ref 1–3.3)
LYMPHOCYTES # BLD AUTO: 13.5 % — SIGNIFICANT CHANGE UP (ref 13–44)
MCHC RBC-ENTMCNC: 26.8 PG — LOW (ref 27–34)
MCHC RBC-ENTMCNC: 31.5 G/DL — LOW (ref 32–36)
MCV RBC AUTO: 84.9 FL — SIGNIFICANT CHANGE UP (ref 80–100)
MONOCYTES # BLD AUTO: 0.5 K/UL — SIGNIFICANT CHANGE UP (ref 0–0.9)
MONOCYTES NFR BLD AUTO: 10.7 % — SIGNIFICANT CHANGE UP (ref 2–14)
NEUTROPHILS # BLD AUTO: 3.2 K/UL — SIGNIFICANT CHANGE UP (ref 1.8–7.4)
NEUTROPHILS NFR BLD AUTO: 69.6 % — SIGNIFICANT CHANGE UP (ref 43–77)
PLATELET # BLD AUTO: 99 K/UL — LOW (ref 150–400)
RBC # BLD: 5 M/UL — SIGNIFICANT CHANGE UP (ref 3.8–5.2)
RBC # FLD: 13.9 % — SIGNIFICANT CHANGE UP (ref 10.3–14.5)
WBC # BLD: 4.6 K/UL — SIGNIFICANT CHANGE UP (ref 3.8–10.5)
WBC # FLD AUTO: 4.6 K/UL — SIGNIFICANT CHANGE UP (ref 3.8–10.5)

## 2021-07-28 ENCOUNTER — RESULT REVIEW (OUTPATIENT)
Age: 61
End: 2021-07-28

## 2021-07-28 ENCOUNTER — APPOINTMENT (OUTPATIENT)
Dept: ULTRASOUND IMAGING | Facility: CLINIC | Age: 61
End: 2021-07-28
Payer: COMMERCIAL

## 2021-07-28 DIAGNOSIS — Z51.11 ENCOUNTER FOR ANTINEOPLASTIC CHEMOTHERAPY: ICD-10-CM

## 2021-07-28 PROCEDURE — 76536 US EXAM OF HEAD AND NECK: CPT

## 2021-08-02 ENCOUNTER — NON-APPOINTMENT (OUTPATIENT)
Age: 61
End: 2021-08-02

## 2021-08-02 DIAGNOSIS — E87.6 HYPOKALEMIA: ICD-10-CM

## 2021-08-10 ENCOUNTER — APPOINTMENT (OUTPATIENT)
Dept: ENDOCRINOLOGY | Facility: CLINIC | Age: 61
End: 2021-08-10
Payer: COMMERCIAL

## 2021-08-10 VITALS
TEMPERATURE: 97.9 F | DIASTOLIC BLOOD PRESSURE: 80 MMHG | HEIGHT: 61 IN | RESPIRATION RATE: 14 BRPM | HEART RATE: 96 BPM | BODY MASS INDEX: 33.23 KG/M2 | WEIGHT: 176 LBS | SYSTOLIC BLOOD PRESSURE: 140 MMHG | OXYGEN SATURATION: 97 %

## 2021-08-10 PROCEDURE — 99214 OFFICE O/P EST MOD 30 MIN: CPT

## 2021-08-10 NOTE — ASSESSMENT
[FreeTextEntry1] : NTMNG in patient with breast Ca 2017 and recurrent disease in 2020 : \par declines compressive sx . images of  tyroid Us shows multiple subcentimeter colloid cysts all stable . \par check tfts today \par \par obesity: she signed up for [employer] . \par she lost 4-6 weeks.  \par encouraged more exercise walking 30 min 3 x week and continue bike 5 x week. \par NAFDL : monitor LFts. fatty liver? vs immunotherapy done for breast Ca. \par \par vitamin d deficiency: continue  vitamin d 1000 u qd \par she had DXA  2019 \par \par hLD; check lipids. \par HTN :  bp at target on meds. Continue current management.\par I advised low fat/low cholesterol diet, low salt diet, and weight loss\par \par rtc in 18 mos

## 2021-08-17 ENCOUNTER — RESULT REVIEW (OUTPATIENT)
Age: 61
End: 2021-08-17

## 2021-08-17 ENCOUNTER — APPOINTMENT (OUTPATIENT)
Dept: HEMATOLOGY ONCOLOGY | Facility: CLINIC | Age: 61
End: 2021-08-17
Payer: COMMERCIAL

## 2021-08-17 ENCOUNTER — LABORATORY RESULT (OUTPATIENT)
Age: 61
End: 2021-08-17

## 2021-08-17 ENCOUNTER — APPOINTMENT (OUTPATIENT)
Age: 61
End: 2021-08-17

## 2021-08-17 VITALS
HEIGHT: 61 IN | OXYGEN SATURATION: 97 % | SYSTOLIC BLOOD PRESSURE: 120 MMHG | WEIGHT: 177.04 LBS | DIASTOLIC BLOOD PRESSURE: 78 MMHG | BODY MASS INDEX: 33.42 KG/M2 | HEART RATE: 86 BPM

## 2021-08-17 LAB
BASOPHILS # BLD AUTO: 0.1 K/UL — SIGNIFICANT CHANGE UP (ref 0–0.2)
BASOPHILS NFR BLD AUTO: 1.9 % — SIGNIFICANT CHANGE UP (ref 0–2)
EOSINOPHIL # BLD AUTO: 0.2 K/UL — SIGNIFICANT CHANGE UP (ref 0–0.5)
EOSINOPHIL NFR BLD AUTO: 5.3 % — SIGNIFICANT CHANGE UP (ref 0–6)
HCT VFR BLD CALC: 42.4 % — SIGNIFICANT CHANGE UP (ref 34.5–45)
HGB BLD-MCNC: 13.3 G/DL — SIGNIFICANT CHANGE UP (ref 11.5–15.5)
LYMPHOCYTES # BLD AUTO: 0.6 K/UL — LOW (ref 1–3.3)
LYMPHOCYTES # BLD AUTO: 13.9 % — SIGNIFICANT CHANGE UP (ref 13–44)
MCHC RBC-ENTMCNC: 27.1 PG — SIGNIFICANT CHANGE UP (ref 27–34)
MCHC RBC-ENTMCNC: 31.4 G/DL — LOW (ref 32–36)
MCV RBC AUTO: 86.2 FL — SIGNIFICANT CHANGE UP (ref 80–100)
MONOCYTES # BLD AUTO: 0.4 K/UL — SIGNIFICANT CHANGE UP (ref 0–0.9)
MONOCYTES NFR BLD AUTO: 10.6 % — SIGNIFICANT CHANGE UP (ref 2–14)
NEUTROPHILS # BLD AUTO: 2.9 K/UL — SIGNIFICANT CHANGE UP (ref 1.8–7.4)
NEUTROPHILS NFR BLD AUTO: 68.3 % — SIGNIFICANT CHANGE UP (ref 43–77)
PLATELET # BLD AUTO: 106 K/UL — LOW (ref 150–400)
RBC # BLD: 4.92 M/UL — SIGNIFICANT CHANGE UP (ref 3.8–5.2)
RBC # FLD: 14.2 % — SIGNIFICANT CHANGE UP (ref 10.3–14.5)
WBC # BLD: 4.2 K/UL — SIGNIFICANT CHANGE UP (ref 3.8–10.5)
WBC # FLD AUTO: 4.2 K/UL — SIGNIFICANT CHANGE UP (ref 3.8–10.5)

## 2021-08-17 PROCEDURE — 99215 OFFICE O/P EST HI 40 MIN: CPT

## 2021-08-17 NOTE — ASSESSMENT
[FreeTextEntry1] : 60 year old postmenopausal female with stage IIA (T2) ER-/VT-/HER2 positive right breast cancer. S/p 6 cycles of neoadjuvant TCHP followed by bilateral mastectomy + BUNNY on 3/14/18, ypT0 ypN0 (pathologic complete response). Completed 1 year of Herceptin on 11/26/18. \par She developed R mastectomy flap recurrence in 10/2020, s/p excision on 11/6/20 -> 3.8 cm poorly differentiated IDC, ER-/VT-/HER2 positive. Completed breast RT 2/25/21.\par She's on adjuvant Kadcyla as of 1/11/21, dose reduced to 3 mg/kg in 7/2021 for elevated LFTs. \par \par Tolerating Kadcyla.\par 8/2/21 CMP - AST/ALT elevated at 83/48, ALk phos 142\par 8/17/21 CBC for oncology: WBC 4.2, HGB 13.3, HCT 42.4%, PLT 106K\par \par Plan: \par -continue Kadcyla 3 mg/kg\par -elevated LFTs likely due to kadcyla, CT scans as below. \par  continue echo q 3 months\par -consider neratinib post kadcyla\par -Restaging CT chest and CT abdomen and pelvis ordered, bone scan also ordered\par -Follow up in 6 weeks.

## 2021-08-17 NOTE — HISTORY OF PRESENT ILLNESS
[de-identified] : Ms. Young was diagnosed with HER2 positive right breast cancer at age 56 in August 2017.\par \par She initially presented with right nipple yellowish discharge. Subsequent mammogram and ultrasound on 7/27/17 showed new spiculated mass in inner central posterior right breast with sonographic confirmation of suspicious mass at 3-4:00 region, and an indeterminate hypoechoic nodule at 1:00 right breast.\par \par On 8/3/17 she had right breast core biopsy of 3-4:00 mass --> pathology showed invasive poorly differentiated ductal carcinoma, Enzo grade 8/9, 0.9 cm--> ER-, NE-, HER2 IHC 3+ (positive)\par Right breast 1:00 --> fibrocystic change, proliferative type with nodular sclerosing adenosis\par \par 8/21/17 MRI breast - Biopsy clip and 3.8 cm area of irregular non mass enhancement in the right breast at the 3-4:00 location posteriorly, corresponding to recently\par diagnosed invasive carcinoma. Additional irregular areas of enhancement in the right breast at the 2-3:00 location and 8-9:00 location, suspicious for additional disease. Representative MR guided core biopsy of enhancement at the 8-9:00 location can be performed to evaluate for multicentric disease, if it would change\par management. 8 mm enhancing nodule demonstrating washout kinetics in the upper slightly inner mid left breast. MR guided core biopsy is advised.\par Clip artifact in the right breast at the 1:00 location marking site of biopsy-proven radial scar. No associated suspicious enhancement. Recommend\par appropriate surgical management. 2.1 cm oval T2 bright lesion in the liver, probable cyst or hemangioma.\par Recommend dedicated liver ultrasound for further evaluation.\par \par 8/28/17 MRI Core biopsy right breast lower outer quadrant --> DCIS, solid pattern, high grade atypia, focal necrosis. ER/NE pending\par Core biopsy left breast central inner --> mild proliferative fibrocystic change\par \par Treatment Summary\par 9/21/17 port placement\par 9/20/17 - 1/3/18 TCHP x 6 cycles (9/30/17 - 1/03/18\par 5/03/18 - 11/26/18 Trastuzumab (Herceptin) X every 3 weeks, to complete 52 weeks.\par She had breast reconstruction on 8/14/18 with nipple reconstruction.\par S/p port removal on 6/19/19.\par \par 3/14/18 - She underwent right skin sparing mastectomy with SNL biopsy and left risk reduction mastectomy with BUNNY reconstruction.\par 1- Left breast, mastectomy- Fibrocystic changes\par 2- Right sentinel lymph node, excision- Three negative lymph nodes (0/3)\par 3- Right sentinel lymph node 2, excision- One negative lymph node (0/1)\par 4- Right breast, mastectomy- No residual invasive carcinoma (s/p neoadjuvant treatment)- No residual DCIS (s/p neoadjuvant treatment)\par  Pathologic staging: ypT0, ypN0\par \par . \par \par Disease: right breast cancer \par Pathology: HER2 positive \par TNM stage: T2 \par \par \par Interval History: Patient returns for follow up. \par She had a R breast US on 10/20/20 for palpable R breast lump which showed 3.5 cm mass at 4:00, 9 cmfn. \par 10/26/20 US guided biopsy R breast 4:00 --Breast, right, 4 o'clock, 9 cm FN, ultrasound guided core biopsy\par - Invasive poorly differentiated ductal carcinoma withfocal necrosis and foci with micropapillary features\par - Enzo score 8/9 (3 + 3+ 2 )\par - Invasive tumor measures at least 1.5 cm\par -ER 0%, NE 0%, HER2 3+\par \par On 11/6/20 she underwent right mastectomy flap excision - IDC, poorly differentiated with necrosis, involvement of dermal skin and retraction artifact, 3.8 x 3.1 x 3.1\par -ER 0%, NE 0%, HER2 3+.\par \par Patient completed breast radiation 2/25/21\par Kadcyla last treatment 6/14/21 [de-identified] : -Returns for follow up. \par -Patient reports doing well.\par -Denies leg swelling, dyspnea, cough, fever, bruising, bleeding, N/V/D\par -She has red marks on her arms and face. Denies itchiness.\par -Since changing the dose of Kadcyla she has had some improvement in fatigue after treatment

## 2021-08-17 NOTE — PHYSICAL EXAM
[Normal] : affect appropriate [de-identified] : supple [de-identified] : CTA b/l [de-identified] : S1/S2 [de-identified] : edema R breast, no palpable mass.

## 2021-08-17 NOTE — ADDENDUM
[FreeTextEntry1] : Documented by Beti Bedolla acting as scribe for Dr. Reddy on 08/17/2021.\par \par All Medical record entries made by the Scribe were at my, Dr. Reddy's, direction and personally dictated by me on 08/17/2021 . I have reviewed the chart and agree that the record accurately reflects my personal performance of the history, physical exam, assessment and plan. I have also personally directed, reviewed, and agreed with the discharge instructions.

## 2021-08-20 ENCOUNTER — RESULT REVIEW (OUTPATIENT)
Age: 61
End: 2021-08-20

## 2021-08-31 ENCOUNTER — OUTPATIENT (OUTPATIENT)
Dept: OUTPATIENT SERVICES | Facility: HOSPITAL | Age: 61
LOS: 1 days | End: 2021-08-31

## 2021-08-31 ENCOUNTER — APPOINTMENT (OUTPATIENT)
Dept: CT IMAGING | Facility: CLINIC | Age: 61
End: 2021-08-31
Payer: COMMERCIAL

## 2021-08-31 ENCOUNTER — APPOINTMENT (OUTPATIENT)
Dept: NUCLEAR MEDICINE | Facility: CLINIC | Age: 61
End: 2021-08-31
Payer: COMMERCIAL

## 2021-08-31 DIAGNOSIS — Z98.890 OTHER SPECIFIED POSTPROCEDURAL STATES: Chronic | ICD-10-CM

## 2021-08-31 DIAGNOSIS — Z90.710 ACQUIRED ABSENCE OF BOTH CERVIX AND UTERUS: Chronic | ICD-10-CM

## 2021-08-31 DIAGNOSIS — C50.911 MALIGNANT NEOPLASM OF UNSPECIFIED SITE OF RIGHT FEMALE BREAST: ICD-10-CM

## 2021-08-31 DIAGNOSIS — Z95.828 PRESENCE OF OTHER VASCULAR IMPLANTS AND GRAFTS: Chronic | ICD-10-CM

## 2021-08-31 DIAGNOSIS — Z98.891 HISTORY OF UTERINE SCAR FROM PREVIOUS SURGERY: Chronic | ICD-10-CM

## 2021-08-31 DIAGNOSIS — T82.9XXA UNSPECIFIED COMPLICATION OF CARDIAC AND VASCULAR PROSTHETIC DEVICE, IMPLANT AND GRAFT, INITIAL ENCOUNTER: Chronic | ICD-10-CM

## 2021-08-31 DIAGNOSIS — Z90.13 ACQUIRED ABSENCE OF BILATERAL BREASTS AND NIPPLES: Chronic | ICD-10-CM

## 2021-08-31 PROCEDURE — 74177 CT ABD & PELVIS W/CONTRAST: CPT | Mod: 26

## 2021-08-31 PROCEDURE — 71260 CT THORAX DX C+: CPT | Mod: 26

## 2021-08-31 PROCEDURE — 78306 BONE IMAGING WHOLE BODY: CPT | Mod: 26

## 2021-09-02 ENCOUNTER — OUTPATIENT (OUTPATIENT)
Dept: OUTPATIENT SERVICES | Facility: HOSPITAL | Age: 61
LOS: 1 days | Discharge: HOME | End: 2021-09-02

## 2021-09-02 DIAGNOSIS — T82.9XXA UNSPECIFIED COMPLICATION OF CARDIAC AND VASCULAR PROSTHETIC DEVICE, IMPLANT AND GRAFT, INITIAL ENCOUNTER: Chronic | ICD-10-CM

## 2021-09-02 DIAGNOSIS — Z95.828 PRESENCE OF OTHER VASCULAR IMPLANTS AND GRAFTS: Chronic | ICD-10-CM

## 2021-09-02 DIAGNOSIS — Z98.890 OTHER SPECIFIED POSTPROCEDURAL STATES: Chronic | ICD-10-CM

## 2021-09-02 DIAGNOSIS — C50.919 MALIGNANT NEOPLASM OF UNSPECIFIED SITE OF UNSPECIFIED FEMALE BREAST: ICD-10-CM

## 2021-09-02 DIAGNOSIS — Z90.13 ACQUIRED ABSENCE OF BILATERAL BREASTS AND NIPPLES: Chronic | ICD-10-CM

## 2021-09-02 DIAGNOSIS — Z98.891 HISTORY OF UTERINE SCAR FROM PREVIOUS SURGERY: Chronic | ICD-10-CM

## 2021-09-02 DIAGNOSIS — Z90.710 ACQUIRED ABSENCE OF BOTH CERVIX AND UTERUS: Chronic | ICD-10-CM

## 2021-09-07 ENCOUNTER — RESULT REVIEW (OUTPATIENT)
Age: 61
End: 2021-09-07

## 2021-09-07 ENCOUNTER — LABORATORY RESULT (OUTPATIENT)
Age: 61
End: 2021-09-07

## 2021-09-07 ENCOUNTER — APPOINTMENT (OUTPATIENT)
Age: 61
End: 2021-09-07

## 2021-09-07 LAB
BASOPHILS # BLD AUTO: 0.1 K/UL — SIGNIFICANT CHANGE UP (ref 0–0.2)
BASOPHILS NFR BLD AUTO: 1.9 % — SIGNIFICANT CHANGE UP (ref 0–2)
EOSINOPHIL # BLD AUTO: 0.2 K/UL — SIGNIFICANT CHANGE UP (ref 0–0.5)
EOSINOPHIL NFR BLD AUTO: 3.4 % — SIGNIFICANT CHANGE UP (ref 0–6)
HCT VFR BLD CALC: 42 % — SIGNIFICANT CHANGE UP (ref 34.5–45)
HGB BLD-MCNC: 13.8 G/DL — SIGNIFICANT CHANGE UP (ref 11.5–15.5)
LYMPHOCYTES # BLD AUTO: 0.8 K/UL — LOW (ref 1–3.3)
LYMPHOCYTES # BLD AUTO: 17.7 % — SIGNIFICANT CHANGE UP (ref 13–44)
MCHC RBC-ENTMCNC: 27.2 PG — SIGNIFICANT CHANGE UP (ref 27–34)
MCHC RBC-ENTMCNC: 32.8 G/DL — SIGNIFICANT CHANGE UP (ref 32–36)
MCV RBC AUTO: 83 FL — SIGNIFICANT CHANGE UP (ref 80–100)
MONOCYTES # BLD AUTO: 0.4 K/UL — SIGNIFICANT CHANGE UP (ref 0–0.9)
MONOCYTES NFR BLD AUTO: 7.5 % — SIGNIFICANT CHANGE UP (ref 2–14)
NEUTROPHILS # BLD AUTO: 3.3 K/UL — SIGNIFICANT CHANGE UP (ref 1.8–7.4)
NEUTROPHILS NFR BLD AUTO: 69.5 % — SIGNIFICANT CHANGE UP (ref 43–77)
PLATELET # BLD AUTO: 112 K/UL — LOW (ref 150–400)
RBC # BLD: 5.06 M/UL — SIGNIFICANT CHANGE UP (ref 3.8–5.2)
RBC # FLD: 13.9 % — SIGNIFICANT CHANGE UP (ref 10.3–14.5)
WBC # BLD: 4.7 K/UL — SIGNIFICANT CHANGE UP (ref 3.8–10.5)
WBC # FLD AUTO: 4.7 K/UL — SIGNIFICANT CHANGE UP (ref 3.8–10.5)

## 2021-09-08 DIAGNOSIS — Z51.11 ENCOUNTER FOR ANTINEOPLASTIC CHEMOTHERAPY: ICD-10-CM

## 2021-09-28 ENCOUNTER — APPOINTMENT (OUTPATIENT)
Dept: HEMATOLOGY ONCOLOGY | Facility: CLINIC | Age: 61
End: 2021-09-28
Payer: COMMERCIAL

## 2021-09-28 ENCOUNTER — RESULT REVIEW (OUTPATIENT)
Age: 61
End: 2021-09-28

## 2021-09-28 ENCOUNTER — LABORATORY RESULT (OUTPATIENT)
Age: 61
End: 2021-09-28

## 2021-09-28 ENCOUNTER — APPOINTMENT (OUTPATIENT)
Age: 61
End: 2021-09-28

## 2021-09-28 VITALS
WEIGHT: 176 LBS | HEART RATE: 83 BPM | HEIGHT: 61 IN | BODY MASS INDEX: 33.23 KG/M2 | OXYGEN SATURATION: 98 % | DIASTOLIC BLOOD PRESSURE: 72 MMHG | SYSTOLIC BLOOD PRESSURE: 110 MMHG

## 2021-09-28 LAB
BASOPHILS # BLD AUTO: 0.1 K/UL — SIGNIFICANT CHANGE UP (ref 0–0.2)
BASOPHILS NFR BLD AUTO: 1.8 % — SIGNIFICANT CHANGE UP (ref 0–2)
EOSINOPHIL # BLD AUTO: 0.1 K/UL — SIGNIFICANT CHANGE UP (ref 0–0.5)
EOSINOPHIL NFR BLD AUTO: 3 % — SIGNIFICANT CHANGE UP (ref 0–6)
HCT VFR BLD CALC: 41.7 % — SIGNIFICANT CHANGE UP (ref 34.5–45)
HGB BLD-MCNC: 13.7 G/DL — SIGNIFICANT CHANGE UP (ref 11.5–15.5)
LYMPHOCYTES # BLD AUTO: 0.6 K/UL — LOW (ref 1–3.3)
LYMPHOCYTES # BLD AUTO: 13.8 % — SIGNIFICANT CHANGE UP (ref 13–44)
MCHC RBC-ENTMCNC: 27.4 PG — SIGNIFICANT CHANGE UP (ref 27–34)
MCHC RBC-ENTMCNC: 32.8 G/DL — SIGNIFICANT CHANGE UP (ref 32–36)
MCV RBC AUTO: 83.6 FL — SIGNIFICANT CHANGE UP (ref 80–100)
MONOCYTES # BLD AUTO: 0.4 K/UL — SIGNIFICANT CHANGE UP (ref 0–0.9)
MONOCYTES NFR BLD AUTO: 9.2 % — SIGNIFICANT CHANGE UP (ref 2–14)
NEUTROPHILS # BLD AUTO: 3.2 K/UL — SIGNIFICANT CHANGE UP (ref 1.8–7.4)
NEUTROPHILS NFR BLD AUTO: 72.2 % — SIGNIFICANT CHANGE UP (ref 43–77)
PLATELET # BLD AUTO: 90 K/UL — LOW (ref 150–400)
RBC # BLD: 4.99 M/UL — SIGNIFICANT CHANGE UP (ref 3.8–5.2)
RBC # FLD: 13.6 % — SIGNIFICANT CHANGE UP (ref 10.3–14.5)
WBC # BLD: 4.5 K/UL — SIGNIFICANT CHANGE UP (ref 3.8–10.5)
WBC # FLD AUTO: 4.5 K/UL — SIGNIFICANT CHANGE UP (ref 3.8–10.5)

## 2021-09-28 PROCEDURE — 99214 OFFICE O/P EST MOD 30 MIN: CPT

## 2021-09-28 NOTE — ASSESSMENT
[FreeTextEntry1] : 60 year old postmenopausal female with stage IIA (T2) ER-/MA-/HER2 positive right breast cancer. S/p 6 cycles of neoadjuvant TCHP followed by bilateral mastectomy + BUNNY on 3/14/18, ypT0 ypN0 (pathologic complete response). Completed 1 year of Herceptin on 11/26/18. \par She developed R mastectomy flap recurrence in 10/2020, s/p excision on 11/6/20 -> 3.8 cm poorly differentiated IDC, ER-/MA-/HER2 positive. Completed breast RT 2/25/21.\par She's on adjuvant Kadcyla as of 1/11/21, dose reduced to 3 mg/kg in 7/2021 for elevated LFTs. \par \par Reviewed 08/31/2021 CT C/A/P:No gross metastatic disease. Interval development of what appears to be mild fibrotic changes in the right lung anteriorly which is presumably post radiation. If there has been no interval treatment, infection may be included in the differential diagnosis, although considered less likely.Mild splenomegaly which appears have mildly increased. Please correlate clinically.\par 08/31/2021 [facility] Total: No scan evidence of osseous metastasis.\par \par Tolerating Kadcyla, except rash on arms, neck, face - no pruritus. \par Reviewed labs 9/7/21 AST 58 U/L ALT 39 U/L Total Bilirubin 1.4 mg/dL \par \par Plan: \par -continue Kadcyla 3 mg/kg, proceed with tx today\par -mildly elevated LFTs likely due to kadcyla\par -Continue Echo q 3 months, scheduled 104/21\par -Consider neratinib post kadcyla\par -CT post radiation changes: f/u with Dr. Celeste \par -Restaging CT chest and CT abdomen and pelvis ordered, bone scan every 4 months\par -Erythematous rash: patient defers derm referral: advised to call if worsens \par -Follow up in 6 weeks.

## 2021-09-28 NOTE — HISTORY OF PRESENT ILLNESS
[de-identified] : Ms. Young was diagnosed with HER2 positive right breast cancer at age 56 in August 2017.\par \par She initially presented with right nipple yellowish discharge. Subsequent mammogram and ultrasound on 7/27/17 showed new spiculated mass in inner central posterior right breast with sonographic confirmation of suspicious mass at 3-4:00 region, and an indeterminate hypoechoic nodule at 1:00 right breast.\par \par On 8/3/17 she had right breast core biopsy of 3-4:00 mass --> pathology showed invasive poorly differentiated ductal carcinoma, Enzo grade 8/9, 0.9 cm--> ER-, ND-, HER2 IHC 3+ (positive)\par Right breast 1:00 --> fibrocystic change, proliferative type with nodular sclerosing adenosis\par \par 8/21/17 MRI breast - Biopsy clip and 3.8 cm area of irregular non mass enhancement in the right breast at the 3-4:00 location posteriorly, corresponding to recently\par diagnosed invasive carcinoma. Additional irregular areas of enhancement in the right breast at the 2-3:00 location and 8-9:00 location, suspicious for additional disease. Representative MR guided core biopsy of enhancement at the 8-9:00 location can be performed to evaluate for multicentric disease, if it would change\par management. 8 mm enhancing nodule demonstrating washout kinetics in the upper slightly inner mid left breast. MR guided core biopsy is advised.\par Clip artifact in the right breast at the 1:00 location marking site of biopsy-proven radial scar. No associated suspicious enhancement. Recommend\par appropriate surgical management. 2.1 cm oval T2 bright lesion in the liver, probable cyst or hemangioma.\par Recommend dedicated liver ultrasound for further evaluation.\par \par 8/28/17 MRI Core biopsy right breast lower outer quadrant --> DCIS, solid pattern, high grade atypia, focal necrosis. ER/ND pending\par Core biopsy left breast central inner --> mild proliferative fibrocystic change\par \par Treatment Summary\par 9/21/17 port placement\par 9/20/17 - 1/3/18 TCHP x 6 cycles (9/30/17 - 1/03/18\par 5/03/18 - 11/26/18 Trastuzumab (Herceptin) X every 3 weeks, to complete 52 weeks.\par She had breast reconstruction on 8/14/18 with nipple reconstruction.\par S/p port removal on 6/19/19.\par \par 3/14/18 - She underwent right skin sparing mastectomy with SNL biopsy and left risk reduction mastectomy with BUNNY reconstruction.\par 1- Left breast, mastectomy- Fibrocystic changes\par 2- Right sentinel lymph node, excision- Three negative lymph nodes (0/3)\par 3- Right sentinel lymph node 2, excision- One negative lymph node (0/1)\par 4- Right breast, mastectomy- No residual invasive carcinoma (s/p neoadjuvant treatment)- No residual DCIS (s/p neoadjuvant treatment)\par  Pathologic staging: ypT0, ypN0\par \par . \par \par Disease: right breast cancer \par Pathology: HER2 positive \par TNM stage: T2 \par \par \par Interval History: Patient returns for follow up. \par She had a R breast US on 10/20/20 for palpable R breast lump which showed 3.5 cm mass at 4:00, 9 cmfn. \par 10/26/20 US guided biopsy R breast 4:00 --Breast, right, 4 o'clock, 9 cm FN, ultrasound guided core biopsy\par - Invasive poorly differentiated ductal carcinoma withfocal necrosis and foci with micropapillary features\par - Enzo score 8/9 (3 + 3+ 2 )\par - Invasive tumor measures at least 1.5 cm\par -ER 0%, ND 0%, HER2 3+\par \par On 11/6/20 she underwent right mastectomy flap excision - IDC, poorly differentiated with necrosis, involvement of dermal skin and retraction artifact, 3.8 x 3.1 x 3.1\par -ER 0%, ND 0%, HER2 3+.\par \par Patient completed breast radiation 2/25/21\par Kadcyla last treatment 6/14/21 [de-identified] : Returns for follow up. \par No cough. No shortness of breath\par Notes intermittent mild ankle swelling \par No fever \par No pain\par No bleeding \par Continued nonraised "red prickly rash" of bilateral arms, chest, face; denies pruritus. Denies new soap or family member with rash \par Improved ROM of right shoulder since starting PT weekly\par \par 08/31/2021 CT Chest and CT Abdomen pelvis\par No gross metastatic disease.\par Interval development of what appears to be mild fibrotic changes in the right lung anteriorly which is presumably post radiation. If there has been no interval treatment, infection may be included in the differential diagnosis, although considered less likely.\par Mild splenomegaly which appears have mildly increased. Please correlate clinically.\par \par 08/31/2021 [facility] Bone imaging total \par No scan evidence of osseous metastasis.\par Degenerative disease in the spine and major joints.\par No significant change compared to the previous bone scan of 11/2/2020.\par \par

## 2021-09-28 NOTE — PHYSICAL EXAM
[Normal] : affect appropriate [de-identified] : supple [de-identified] : edema R breast, no palpable mass.

## 2021-10-04 ENCOUNTER — APPOINTMENT (OUTPATIENT)
Dept: CARDIOLOGY | Facility: CLINIC | Age: 61
End: 2021-10-04
Payer: COMMERCIAL

## 2021-10-04 PROCEDURE — 93356 MYOCRD STRAIN IMG SPCKL TRCK: CPT

## 2021-10-04 PROCEDURE — 76376 3D RENDER W/INTRP POSTPROCES: CPT

## 2021-10-04 PROCEDURE — 93306 TTE W/DOPPLER COMPLETE: CPT

## 2021-10-05 ENCOUNTER — APPOINTMENT (OUTPATIENT)
Dept: SURGERY | Facility: CLINIC | Age: 61
End: 2021-10-05
Payer: COMMERCIAL

## 2021-10-05 VITALS
OXYGEN SATURATION: 98 % | HEIGHT: 61 IN | BODY MASS INDEX: 31.91 KG/M2 | HEART RATE: 74 BPM | DIASTOLIC BLOOD PRESSURE: 68 MMHG | TEMPERATURE: 98 F | SYSTOLIC BLOOD PRESSURE: 111 MMHG | WEIGHT: 169 LBS

## 2021-10-05 PROCEDURE — 99213 OFFICE O/P EST LOW 20 MIN: CPT

## 2021-10-05 NOTE — ASSESSMENT
[FreeTextEntry1] : 61 yo with ipsilateral tumor recurrence (rpT2N0 grade 3 ER-AL-HER2+), s/p right mastectomy flap excision on 11/6/2020.  She completed PMRT 3/2021.  She was initially diagnosed with IDC right breast in 8/2017 s/p neoadjuvant chemotherapy followed by bilateral mastectomy with right SLNB and BUNNY on 3/14/08264 showing complete response and completed 1 year of Herceptin.  Recommendation for:\par 1.  Follow up with Plastics ( will follow up with  for revision surgery in the spring.  At this time defers)\par 2.  Follow up with medical oncology\par 3.  Follow up with radiation oncology\par 4.  Follow up in 4 months

## 2021-10-05 NOTE — PHYSICAL EXAM
[Normocephalic] : normocephalic [Atraumatic] : atraumatic [PERRL] : pupils equal, round and reactive to light [Sclera nonicteric] : sclera nonicteric [Supple] : supple [No Supraclavicular Adenopathy] : no supraclavicular adenopathy [Examined in the supine and seated position] : examined in the supine and seated position [No dominant masses] : no dominant masses in right breast  [No dominant masses] : no dominant masses left breast [No Nipple Retraction] : no left nipple retraction [No Nipple Discharge] : no left nipple discharge [No Axillary Lymphadenopathy] : no left axillary lymphadenopathy [No Edema] : no edema [No Rashes] : no rashes [No Ulceration] : no ulceration [de-identified] : flap health and well integrated  no evidence of hematoma seroma or infection\par  [de-identified] : flap health and well integrated  no evidence of hematoma seroma or infection\par

## 2021-10-12 ENCOUNTER — OUTPATIENT (OUTPATIENT)
Dept: OUTPATIENT SERVICES | Facility: HOSPITAL | Age: 61
LOS: 1 days | Discharge: HOME | End: 2021-10-12

## 2021-10-12 DIAGNOSIS — Z98.891 HISTORY OF UTERINE SCAR FROM PREVIOUS SURGERY: Chronic | ICD-10-CM

## 2021-10-12 DIAGNOSIS — Z90.13 ACQUIRED ABSENCE OF BILATERAL BREASTS AND NIPPLES: Chronic | ICD-10-CM

## 2021-10-12 DIAGNOSIS — T82.9XXA UNSPECIFIED COMPLICATION OF CARDIAC AND VASCULAR PROSTHETIC DEVICE, IMPLANT AND GRAFT, INITIAL ENCOUNTER: Chronic | ICD-10-CM

## 2021-10-12 DIAGNOSIS — Z98.890 OTHER SPECIFIED POSTPROCEDURAL STATES: Chronic | ICD-10-CM

## 2021-10-12 DIAGNOSIS — Z90.710 ACQUIRED ABSENCE OF BOTH CERVIX AND UTERUS: Chronic | ICD-10-CM

## 2021-10-12 DIAGNOSIS — C50.919 MALIGNANT NEOPLASM OF UNSPECIFIED SITE OF UNSPECIFIED FEMALE BREAST: ICD-10-CM

## 2021-10-12 DIAGNOSIS — Z95.828 PRESENCE OF OTHER VASCULAR IMPLANTS AND GRAFTS: Chronic | ICD-10-CM

## 2021-10-13 ENCOUNTER — RESULT REVIEW (OUTPATIENT)
Age: 61
End: 2021-10-13

## 2021-10-13 ENCOUNTER — APPOINTMENT (OUTPATIENT)
Dept: HEMATOLOGY ONCOLOGY | Facility: CLINIC | Age: 61
End: 2021-10-13

## 2021-10-13 LAB
BASOPHILS # BLD AUTO: 0.1 K/UL — SIGNIFICANT CHANGE UP (ref 0–0.2)
BASOPHILS NFR BLD AUTO: 1.3 % — SIGNIFICANT CHANGE UP (ref 0–2)
EOSINOPHIL # BLD AUTO: 0.2 K/UL — SIGNIFICANT CHANGE UP (ref 0–0.5)
EOSINOPHIL NFR BLD AUTO: 4.8 % — SIGNIFICANT CHANGE UP (ref 0–6)
HCT VFR BLD CALC: 41.6 % — SIGNIFICANT CHANGE UP (ref 34.5–45)
HGB BLD-MCNC: 13.1 G/DL — SIGNIFICANT CHANGE UP (ref 11.5–15.5)
LYMPHOCYTES # BLD AUTO: 0.6 K/UL — LOW (ref 1–3.3)
LYMPHOCYTES # BLD AUTO: 15.2 % — SIGNIFICANT CHANGE UP (ref 13–44)
MCHC RBC-ENTMCNC: 27.6 PG — SIGNIFICANT CHANGE UP (ref 27–34)
MCHC RBC-ENTMCNC: 31.5 G/DL — LOW (ref 32–36)
MCV RBC AUTO: 87.3 FL — SIGNIFICANT CHANGE UP (ref 80–100)
MONOCYTES # BLD AUTO: 0.5 K/UL — SIGNIFICANT CHANGE UP (ref 0–0.9)
MONOCYTES NFR BLD AUTO: 11.6 % — SIGNIFICANT CHANGE UP (ref 2–14)
NEUTROPHILS # BLD AUTO: 2.8 K/UL — SIGNIFICANT CHANGE UP (ref 1.8–7.4)
NEUTROPHILS NFR BLD AUTO: 67 % — SIGNIFICANT CHANGE UP (ref 43–77)
PLATELET # BLD AUTO: 95 K/UL — LOW (ref 150–400)
RBC # BLD: 4.76 M/UL — SIGNIFICANT CHANGE UP (ref 3.8–5.2)
RBC # FLD: 13.8 % — SIGNIFICANT CHANGE UP (ref 10.3–14.5)
WBC # BLD: 4.2 K/UL — SIGNIFICANT CHANGE UP (ref 3.8–10.5)
WBC # FLD AUTO: 4.2 K/UL — SIGNIFICANT CHANGE UP (ref 3.8–10.5)

## 2021-10-14 ENCOUNTER — NON-APPOINTMENT (OUTPATIENT)
Age: 61
End: 2021-10-14

## 2021-10-14 ENCOUNTER — TRANSCRIPTION ENCOUNTER (OUTPATIENT)
Age: 61
End: 2021-10-14

## 2021-10-14 LAB
ALBUMIN SERPL ELPH-MCNC: 4 G/DL
ALP BLD-CCNC: 150 U/L
ALT SERPL-CCNC: 43 U/L
ANION GAP SERPL CALC-SCNC: 11 MMOL/L
AST SERPL-CCNC: 72 U/L
BILIRUB SERPL-MCNC: 1.6 MG/DL
BUN SERPL-MCNC: 12 MG/DL
CALCIUM SERPL-MCNC: 9.5 MG/DL
CHLORIDE SERPL-SCNC: 106 MMOL/L
CO2 SERPL-SCNC: 24 MMOL/L
CREAT SERPL-MCNC: 0.75 MG/DL
GLUCOSE SERPL-MCNC: 105 MG/DL
POTASSIUM SERPL-SCNC: 3.3 MMOL/L
PROT SERPL-MCNC: 6.7 G/DL
SODIUM SERPL-SCNC: 141 MMOL/L

## 2021-10-19 ENCOUNTER — APPOINTMENT (OUTPATIENT)
Age: 61
End: 2021-10-19

## 2021-10-19 ENCOUNTER — RESULT REVIEW (OUTPATIENT)
Age: 61
End: 2021-10-19

## 2021-10-19 ENCOUNTER — LABORATORY RESULT (OUTPATIENT)
Age: 61
End: 2021-10-19

## 2021-10-19 LAB
BASOPHILS # BLD AUTO: 0.1 K/UL — SIGNIFICANT CHANGE UP (ref 0–0.2)
BASOPHILS NFR BLD AUTO: 1.5 % — SIGNIFICANT CHANGE UP (ref 0–2)
EOSINOPHIL # BLD AUTO: 0.2 K/UL — SIGNIFICANT CHANGE UP (ref 0–0.5)
EOSINOPHIL NFR BLD AUTO: 4.8 % — SIGNIFICANT CHANGE UP (ref 0–6)
HCT VFR BLD CALC: 41.6 % — SIGNIFICANT CHANGE UP (ref 34.5–45)
HGB BLD-MCNC: 13.3 G/DL — SIGNIFICANT CHANGE UP (ref 11.5–15.5)
LYMPHOCYTES # BLD AUTO: 0.8 K/UL — LOW (ref 1–3.3)
LYMPHOCYTES # BLD AUTO: 19.3 % — SIGNIFICANT CHANGE UP (ref 13–44)
MCHC RBC-ENTMCNC: 27.7 PG — SIGNIFICANT CHANGE UP (ref 27–34)
MCHC RBC-ENTMCNC: 32 G/DL — SIGNIFICANT CHANGE UP (ref 32–36)
MCV RBC AUTO: 86.4 FL — SIGNIFICANT CHANGE UP (ref 80–100)
MONOCYTES # BLD AUTO: 0.3 K/UL — SIGNIFICANT CHANGE UP (ref 0–0.9)
MONOCYTES NFR BLD AUTO: 7.6 % — SIGNIFICANT CHANGE UP (ref 2–14)
NEUTROPHILS # BLD AUTO: 2.7 K/UL — SIGNIFICANT CHANGE UP (ref 1.8–7.4)
NEUTROPHILS NFR BLD AUTO: 66.9 % — SIGNIFICANT CHANGE UP (ref 43–77)
PLATELET # BLD AUTO: 93 K/UL — LOW (ref 150–400)
RBC # BLD: 4.82 M/UL — SIGNIFICANT CHANGE UP (ref 3.8–5.2)
RBC # FLD: 13.8 % — SIGNIFICANT CHANGE UP (ref 10.3–14.5)
WBC # BLD: 4 K/UL — SIGNIFICANT CHANGE UP (ref 3.8–10.5)
WBC # FLD AUTO: 4 K/UL — SIGNIFICANT CHANGE UP (ref 3.8–10.5)

## 2021-10-20 DIAGNOSIS — Z51.11 ENCOUNTER FOR ANTINEOPLASTIC CHEMOTHERAPY: ICD-10-CM

## 2021-11-05 ENCOUNTER — TRANSCRIPTION ENCOUNTER (OUTPATIENT)
Age: 61
End: 2021-11-05

## 2021-11-12 ENCOUNTER — OUTPATIENT (OUTPATIENT)
Dept: OUTPATIENT SERVICES | Facility: HOSPITAL | Age: 61
LOS: 1 days | Discharge: HOME | End: 2021-11-12

## 2021-11-12 DIAGNOSIS — Z95.828 PRESENCE OF OTHER VASCULAR IMPLANTS AND GRAFTS: Chronic | ICD-10-CM

## 2021-11-12 DIAGNOSIS — Z98.891 HISTORY OF UTERINE SCAR FROM PREVIOUS SURGERY: Chronic | ICD-10-CM

## 2021-11-12 DIAGNOSIS — Z90.710 ACQUIRED ABSENCE OF BOTH CERVIX AND UTERUS: Chronic | ICD-10-CM

## 2021-11-12 DIAGNOSIS — Z98.890 OTHER SPECIFIED POSTPROCEDURAL STATES: Chronic | ICD-10-CM

## 2021-11-12 DIAGNOSIS — C50.919 MALIGNANT NEOPLASM OF UNSPECIFIED SITE OF UNSPECIFIED FEMALE BREAST: ICD-10-CM

## 2021-11-12 DIAGNOSIS — T82.9XXA UNSPECIFIED COMPLICATION OF CARDIAC AND VASCULAR PROSTHETIC DEVICE, IMPLANT AND GRAFT, INITIAL ENCOUNTER: Chronic | ICD-10-CM

## 2021-11-12 DIAGNOSIS — Z90.13 ACQUIRED ABSENCE OF BILATERAL BREASTS AND NIPPLES: Chronic | ICD-10-CM

## 2021-11-16 ENCOUNTER — APPOINTMENT (OUTPATIENT)
Dept: RADIATION ONCOLOGY | Facility: CLINIC | Age: 61
End: 2021-11-16
Payer: COMMERCIAL

## 2021-11-16 ENCOUNTER — NON-APPOINTMENT (OUTPATIENT)
Age: 61
End: 2021-11-16

## 2021-11-16 ENCOUNTER — RESULT REVIEW (OUTPATIENT)
Age: 61
End: 2021-11-16

## 2021-11-16 ENCOUNTER — APPOINTMENT (OUTPATIENT)
Age: 61
End: 2021-11-16

## 2021-11-16 ENCOUNTER — LABORATORY RESULT (OUTPATIENT)
Age: 61
End: 2021-11-16

## 2021-11-16 VITALS
HEART RATE: 95 BPM | HEIGHT: 61 IN | DIASTOLIC BLOOD PRESSURE: 87 MMHG | SYSTOLIC BLOOD PRESSURE: 122 MMHG | OXYGEN SATURATION: 96 % | RESPIRATION RATE: 15 BRPM | BODY MASS INDEX: 31.72 KG/M2 | WEIGHT: 168 LBS | TEMPERATURE: 97 F

## 2021-11-16 DIAGNOSIS — C50.911 MALIGNANT NEOPLASM OF UNSPECIFIED SITE OF RIGHT FEMALE BREAST: ICD-10-CM

## 2021-11-16 LAB
BASOPHILS # BLD AUTO: 0.1 K/UL — SIGNIFICANT CHANGE UP (ref 0–0.2)
BASOPHILS NFR BLD AUTO: 1.8 % — SIGNIFICANT CHANGE UP (ref 0–2)
EOSINOPHIL # BLD AUTO: 0.3 K/UL — SIGNIFICANT CHANGE UP (ref 0–0.5)
EOSINOPHIL NFR BLD AUTO: 5.1 % — SIGNIFICANT CHANGE UP (ref 0–6)
HCT VFR BLD CALC: 43.2 % — SIGNIFICANT CHANGE UP (ref 34.5–45)
HGB BLD-MCNC: 14 G/DL — SIGNIFICANT CHANGE UP (ref 11.5–15.5)
LYMPHOCYTES # BLD AUTO: 0.6 K/UL — LOW (ref 1–3.3)
LYMPHOCYTES # BLD AUTO: 12.2 % — LOW (ref 13–44)
MCHC RBC-ENTMCNC: 28.9 PG — SIGNIFICANT CHANGE UP (ref 27–34)
MCHC RBC-ENTMCNC: 32.4 G/DL — SIGNIFICANT CHANGE UP (ref 32–36)
MCV RBC AUTO: 89.2 FL — SIGNIFICANT CHANGE UP (ref 80–100)
MONOCYTES # BLD AUTO: 0.4 K/UL — SIGNIFICANT CHANGE UP (ref 0–0.9)
MONOCYTES NFR BLD AUTO: 8.3 % — SIGNIFICANT CHANGE UP (ref 2–14)
NEUTROPHILS # BLD AUTO: 3.9 K/UL — SIGNIFICANT CHANGE UP (ref 1.8–7.4)
NEUTROPHILS NFR BLD AUTO: 72.7 % — SIGNIFICANT CHANGE UP (ref 43–77)
PLATELET # BLD AUTO: 114 K/UL — LOW (ref 150–400)
RBC # BLD: 4.84 M/UL — SIGNIFICANT CHANGE UP (ref 3.8–5.2)
RBC # FLD: 14.2 % — SIGNIFICANT CHANGE UP (ref 10.3–14.5)
WBC # BLD: 5.3 K/UL — SIGNIFICANT CHANGE UP (ref 3.8–10.5)
WBC # FLD AUTO: 5.3 K/UL — SIGNIFICANT CHANGE UP (ref 3.8–10.5)

## 2021-11-16 PROCEDURE — 99214 OFFICE O/P EST MOD 30 MIN: CPT

## 2021-11-16 NOTE — HISTORY OF PRESENT ILLNESS
[FreeTextEntry1] : 60 year old woman with ipsilateral tumor recurrence (rpT2 N0, grade 3, ER-/[place]-, HER2+), s/p right mastectomy flap excision on 11/6/2020, completed post-mastectomy radiation therapy on 2/25/21. Continues on Kadcyla.\par \par Underwent PT for right arm stiffness, which has improved.\par Denies chest wall pain or new bother.\par \par CT c/a/p on 8/31/21 noted:\par Interval development of what appears to be mild fibrotic changes in the right lung anteriorly which is presumably post radiation. If there has been no interval treatment, infection may be included in the differential diagnosis, although considered less likely.

## 2021-11-16 NOTE — REVIEW OF SYSTEMS
[Fatigue: Grade 0] : Fatigue: Grade 0 [Pruritus: Grade 0] : Pruritus: Grade 0 [Skin Atrophy: Grade 0] : Skin Atrophy: Grade 0 [Skin Induration: Grade 1 - Mild induration, able to move skin parallel to plane (sliding) and perpendicular to skin (pinching up)] : Skin Induration: Grade 1 - Mild induration, able to move skin parallel to plane (sliding) and perpendicular to skin (pinching up)

## 2021-11-16 NOTE — PHYSICAL EXAM
[Extraocular Movements] : extraocular movements were intact [Outer Ear] : the ears and nose were normal in appearance [Bowel Sounds] : normal bowel sounds [Axillary Lymph Nodes Enlarged Bilaterally] : axillary [Motor Tone] : muscle strength and tone were normal [Normal] : normal skin color and pigmentation and no rash

## 2021-11-17 DIAGNOSIS — Z51.11 ENCOUNTER FOR ANTINEOPLASTIC CHEMOTHERAPY: ICD-10-CM

## 2021-12-01 ENCOUNTER — RESULT REVIEW (OUTPATIENT)
Age: 61
End: 2021-12-01

## 2021-12-01 ENCOUNTER — APPOINTMENT (OUTPATIENT)
Dept: HEMATOLOGY ONCOLOGY | Facility: CLINIC | Age: 61
End: 2021-12-01
Payer: COMMERCIAL

## 2021-12-01 VITALS
BODY MASS INDEX: 31.15 KG/M2 | WEIGHT: 165 LBS | HEART RATE: 87 BPM | DIASTOLIC BLOOD PRESSURE: 71 MMHG | OXYGEN SATURATION: 96 % | HEIGHT: 61 IN | SYSTOLIC BLOOD PRESSURE: 119 MMHG

## 2021-12-01 LAB
BASOPHILS # BLD AUTO: 0.1 K/UL — SIGNIFICANT CHANGE UP (ref 0–0.2)
BASOPHILS NFR BLD AUTO: 2.1 % — HIGH (ref 0–2)
EOSINOPHIL # BLD AUTO: 0.3 K/UL — SIGNIFICANT CHANGE UP (ref 0–0.5)
EOSINOPHIL NFR BLD AUTO: 7.5 % — HIGH (ref 0–6)
HCT VFR BLD CALC: 42.6 % — SIGNIFICANT CHANGE UP (ref 34.5–45)
HGB BLD-MCNC: 13.6 G/DL — SIGNIFICANT CHANGE UP (ref 11.5–15.5)
LYMPHOCYTES # BLD AUTO: 0.6 K/UL — LOW (ref 1–3.3)
LYMPHOCYTES # BLD AUTO: 15.5 % — SIGNIFICANT CHANGE UP (ref 13–44)
MCHC RBC-ENTMCNC: 28.7 PG — SIGNIFICANT CHANGE UP (ref 27–34)
MCHC RBC-ENTMCNC: 31.9 G/DL — LOW (ref 32–36)
MCV RBC AUTO: 90 FL — SIGNIFICANT CHANGE UP (ref 80–100)
MONOCYTES # BLD AUTO: 0.5 K/UL — SIGNIFICANT CHANGE UP (ref 0–0.9)
MONOCYTES NFR BLD AUTO: 12.7 % — SIGNIFICANT CHANGE UP (ref 2–14)
NEUTROPHILS # BLD AUTO: 2.4 K/UL — SIGNIFICANT CHANGE UP (ref 1.8–7.4)
NEUTROPHILS NFR BLD AUTO: 62.2 % — SIGNIFICANT CHANGE UP (ref 43–77)
PLATELET # BLD AUTO: 86 K/UL — LOW (ref 150–400)
RBC # BLD: 4.73 M/UL — SIGNIFICANT CHANGE UP (ref 3.8–5.2)
RBC # FLD: 13.9 % — SIGNIFICANT CHANGE UP (ref 10.3–14.5)
WBC # BLD: 3.8 K/UL — SIGNIFICANT CHANGE UP (ref 3.8–10.5)
WBC # FLD AUTO: 3.8 K/UL — SIGNIFICANT CHANGE UP (ref 3.8–10.5)

## 2021-12-01 PROCEDURE — 99215 OFFICE O/P EST HI 40 MIN: CPT

## 2021-12-01 NOTE — ASSESSMENT
[FreeTextEntry1] : 60 year old postmenopausal female with stage IIA (T2) ER-/WA-/HER2 positive right breast cancer. S/p 6 cycles of neoadjuvant TCHP followed by bilateral mastectomy + BUNNY on 3/14/18, ypT0 ypN0 (pathologic complete response). Completed 1 year of Herceptin on 11/26/18. \par She developed R mastectomy flap recurrence in 10/2020, s/p excision on 11/6/20 -> 3.8 cm poorly differentiated IDC, ER-/WA-/HER2 positive. Completed breast RT 2/25/21.\par She's on adjuvant Kadcyla as of 1/11/21, dose reduced to 3 mg/kg in 7/2021 for elevated LFTs. \par \par 8/31/2021 CT C/A/P:No gross metastatic disease. Interval development of what appears to be mild fibrotic changes in the right lung anteriorly which is presumably post radiation. If there has been no interval treatment, infection may be included in the differential diagnosis, although considered less likely.Mild splenomegaly which appears have mildly increased. Please correlate clinically.\par 08/31/2021 [facility] Total: No scan evidence of osseous metastasis.\par \par Tolerating Kadcyla, except rash on arms, neck, face - no pruritus.  \par 11/16/21 AST 65 U/L ALT 37 U/L Total Bilirubin 1.4 mg/dL \par \par Plan: \par -continue Kadcyla 3 mg/kg, last treatment 12/07/21\par -mildly elevated LFTs likely due to kadcyla\par -Continue Echo q 3 months, last on 10/4/21. \par -Consider Nenatinib post kadcyla, will discuss at tumor board\par -Restaging CT chest and CT abdomen and pelvis ordered, bone scan every 4 months, scheduled Jan. 2022\par -Erythematous rash: derm referral\par -RTO 6 weeks after CT scans

## 2021-12-01 NOTE — ADDENDUM
[FreeTextEntry1] : Documented by Rocky Phoenix acting as scribe for Dr. Reddy on 12/01/2021. \par \par All Medical record entries made by the Scribe were at my, Dr. Reddy's, direction and personally dictated by me on 12/01/2021. I have reviewed the chart and agree that the record accurately reflects my personal performance of the history, physical exam, assessment and plan. I have also personally directed, reviewed, and agreed with the discharge instructions.

## 2021-12-01 NOTE — PHYSICAL EXAM
[Normal] : affect appropriate [de-identified] : supple [de-identified] : edema R breast, no palpable mass.  [de-identified] : raise erythematous rash on face, neck,

## 2021-12-02 LAB
ALBUMIN SERPL ELPH-MCNC: 4.1 G/DL
ALP BLD-CCNC: 174 U/L
ALT SERPL-CCNC: 52 U/L
ANION GAP SERPL CALC-SCNC: 12 MMOL/L
AST SERPL-CCNC: 81 U/L
BILIRUB SERPL-MCNC: 1.4 MG/DL
BUN SERPL-MCNC: 12 MG/DL
CALCIUM SERPL-MCNC: 9.7 MG/DL
CHLORIDE SERPL-SCNC: 104 MMOL/L
CO2 SERPL-SCNC: 27 MMOL/L
CREAT SERPL-MCNC: 0.72 MG/DL
GLUCOSE SERPL-MCNC: 92 MG/DL
MAGNESIUM SERPL-MCNC: 1.6 MG/DL
POTASSIUM SERPL-SCNC: 3.3 MMOL/L
PROT SERPL-MCNC: 7 G/DL
SODIUM SERPL-SCNC: 143 MMOL/L

## 2021-12-07 ENCOUNTER — APPOINTMENT (OUTPATIENT)
Age: 61
End: 2021-12-07

## 2021-12-07 ENCOUNTER — RESULT REVIEW (OUTPATIENT)
Age: 61
End: 2021-12-07

## 2021-12-07 ENCOUNTER — LABORATORY RESULT (OUTPATIENT)
Age: 61
End: 2021-12-07

## 2021-12-07 LAB
BASOPHILS # BLD AUTO: 0.1 K/UL — SIGNIFICANT CHANGE UP (ref 0–0.2)
BASOPHILS NFR BLD AUTO: 1.5 % — SIGNIFICANT CHANGE UP (ref 0–2)
EOSINOPHIL # BLD AUTO: 0.2 K/UL — SIGNIFICANT CHANGE UP (ref 0–0.5)
EOSINOPHIL NFR BLD AUTO: 5.4 % — SIGNIFICANT CHANGE UP (ref 0–6)
HCT VFR BLD CALC: 39.5 % — SIGNIFICANT CHANGE UP (ref 34.5–45)
HGB BLD-MCNC: 12.8 G/DL — SIGNIFICANT CHANGE UP (ref 11.5–15.5)
LYMPHOCYTES # BLD AUTO: 0.8 K/UL — LOW (ref 1–3.3)
LYMPHOCYTES # BLD AUTO: 19.6 % — SIGNIFICANT CHANGE UP (ref 13–44)
MCHC RBC-ENTMCNC: 29.1 PG — SIGNIFICANT CHANGE UP (ref 27–34)
MCHC RBC-ENTMCNC: 32.3 G/DL — SIGNIFICANT CHANGE UP (ref 32–36)
MCV RBC AUTO: 90 FL — SIGNIFICANT CHANGE UP (ref 80–100)
MONOCYTES # BLD AUTO: 0.4 K/UL — SIGNIFICANT CHANGE UP (ref 0–0.9)
MONOCYTES NFR BLD AUTO: 8.7 % — SIGNIFICANT CHANGE UP (ref 2–14)
NEUTROPHILS # BLD AUTO: 2.8 K/UL — SIGNIFICANT CHANGE UP (ref 1.8–7.4)
NEUTROPHILS NFR BLD AUTO: 64.7 % — SIGNIFICANT CHANGE UP (ref 43–77)
PLATELET # BLD AUTO: 86 K/UL — LOW (ref 150–400)
RBC # BLD: 4.39 M/UL — SIGNIFICANT CHANGE UP (ref 3.8–5.2)
RBC # FLD: 13.6 % — SIGNIFICANT CHANGE UP (ref 10.3–14.5)
WBC # BLD: 4.3 K/UL — SIGNIFICANT CHANGE UP (ref 3.8–10.5)
WBC # FLD AUTO: 4.3 K/UL — SIGNIFICANT CHANGE UP (ref 3.8–10.5)

## 2021-12-08 ENCOUNTER — APPOINTMENT (OUTPATIENT)
Dept: DERMATOLOGY | Facility: CLINIC | Age: 61
End: 2021-12-08

## 2021-12-27 NOTE — BRIEF OPERATIVE NOTE - ELECTIVE PROCEDURE
Yes [Well Developed] : well developed [Well Nourished] : well nourished [No Acute Distress] : no acute distress [Normal S1, S2] : normal S1, S2 [No Murmur] : no murmur [Clear Lung Fields] : clear lung fields [Good Air Entry] : good air entry [No Respiratory Distress] : no respiratory distress  [Soft] : abdomen soft [Non Tender] : non-tender [No Edema] : no edema [Alert and Oriented] : alert and oriented [de-identified] : Pupils are round [de-identified] : No JVD [de-identified] : Abnormal gait

## 2021-12-28 ENCOUNTER — APPOINTMENT (OUTPATIENT)
Age: 61
End: 2021-12-28

## 2022-01-05 ENCOUNTER — APPOINTMENT (OUTPATIENT)
Dept: CT IMAGING | Facility: CLINIC | Age: 62
End: 2022-01-05
Payer: COMMERCIAL

## 2022-01-05 ENCOUNTER — APPOINTMENT (OUTPATIENT)
Dept: NUCLEAR MEDICINE | Facility: CLINIC | Age: 62
End: 2022-01-05
Payer: COMMERCIAL

## 2022-01-05 ENCOUNTER — OUTPATIENT (OUTPATIENT)
Dept: OUTPATIENT SERVICES | Facility: HOSPITAL | Age: 62
LOS: 1 days | End: 2022-01-05

## 2022-01-05 DIAGNOSIS — Z98.890 OTHER SPECIFIED POSTPROCEDURAL STATES: Chronic | ICD-10-CM

## 2022-01-05 DIAGNOSIS — T82.9XXA UNSPECIFIED COMPLICATION OF CARDIAC AND VASCULAR PROSTHETIC DEVICE, IMPLANT AND GRAFT, INITIAL ENCOUNTER: Chronic | ICD-10-CM

## 2022-01-05 DIAGNOSIS — Z90.710 ACQUIRED ABSENCE OF BOTH CERVIX AND UTERUS: Chronic | ICD-10-CM

## 2022-01-05 DIAGNOSIS — Z95.828 PRESENCE OF OTHER VASCULAR IMPLANTS AND GRAFTS: Chronic | ICD-10-CM

## 2022-01-05 DIAGNOSIS — C50.911 MALIGNANT NEOPLASM OF UNSPECIFIED SITE OF RIGHT FEMALE BREAST: ICD-10-CM

## 2022-01-05 DIAGNOSIS — Z90.13 ACQUIRED ABSENCE OF BILATERAL BREASTS AND NIPPLES: Chronic | ICD-10-CM

## 2022-01-05 DIAGNOSIS — Z98.891 HISTORY OF UTERINE SCAR FROM PREVIOUS SURGERY: Chronic | ICD-10-CM

## 2022-01-05 PROCEDURE — 78306 BONE IMAGING WHOLE BODY: CPT | Mod: 26

## 2022-01-05 PROCEDURE — 71260 CT THORAX DX C+: CPT | Mod: 26

## 2022-01-05 PROCEDURE — 74177 CT ABD & PELVIS W/CONTRAST: CPT | Mod: 26

## 2022-01-18 ENCOUNTER — OUTPATIENT (OUTPATIENT)
Dept: OUTPATIENT SERVICES | Facility: HOSPITAL | Age: 62
LOS: 1 days | Discharge: HOME | End: 2022-01-18

## 2022-01-18 ENCOUNTER — APPOINTMENT (OUTPATIENT)
Age: 62
End: 2022-01-18

## 2022-01-18 DIAGNOSIS — C50.919 MALIGNANT NEOPLASM OF UNSPECIFIED SITE OF UNSPECIFIED FEMALE BREAST: ICD-10-CM

## 2022-01-18 DIAGNOSIS — Z90.710 ACQUIRED ABSENCE OF BOTH CERVIX AND UTERUS: Chronic | ICD-10-CM

## 2022-01-18 DIAGNOSIS — Z98.891 HISTORY OF UTERINE SCAR FROM PREVIOUS SURGERY: Chronic | ICD-10-CM

## 2022-01-18 DIAGNOSIS — Z90.13 ACQUIRED ABSENCE OF BILATERAL BREASTS AND NIPPLES: Chronic | ICD-10-CM

## 2022-01-18 DIAGNOSIS — Z95.828 PRESENCE OF OTHER VASCULAR IMPLANTS AND GRAFTS: Chronic | ICD-10-CM

## 2022-01-18 DIAGNOSIS — T82.9XXA UNSPECIFIED COMPLICATION OF CARDIAC AND VASCULAR PROSTHETIC DEVICE, IMPLANT AND GRAFT, INITIAL ENCOUNTER: Chronic | ICD-10-CM

## 2022-01-18 DIAGNOSIS — Z98.890 OTHER SPECIFIED POSTPROCEDURAL STATES: Chronic | ICD-10-CM

## 2022-01-20 ENCOUNTER — RESULT REVIEW (OUTPATIENT)
Age: 62
End: 2022-01-20

## 2022-01-20 ENCOUNTER — APPOINTMENT (OUTPATIENT)
Dept: HEMATOLOGY ONCOLOGY | Facility: CLINIC | Age: 62
End: 2022-01-20
Payer: COMMERCIAL

## 2022-01-20 VITALS
BODY MASS INDEX: 31.53 KG/M2 | OXYGEN SATURATION: 97 % | SYSTOLIC BLOOD PRESSURE: 118 MMHG | DIASTOLIC BLOOD PRESSURE: 73 MMHG | HEIGHT: 61 IN | HEART RATE: 85 BPM | WEIGHT: 167.01 LBS

## 2022-01-20 DIAGNOSIS — M79.89 OTHER SPECIFIED SOFT TISSUE DISORDERS: ICD-10-CM

## 2022-01-20 DIAGNOSIS — R93.89 ABNORMAL FINDINGS ON DIAGNOSTIC IMAGING OF OTHER SPECIFIED BODY STRUCTURES: ICD-10-CM

## 2022-01-20 LAB
BASOPHILS # BLD AUTO: 0.1 K/UL — SIGNIFICANT CHANGE UP (ref 0–0.2)
BASOPHILS NFR BLD AUTO: 1.9 % — SIGNIFICANT CHANGE UP (ref 0–2)
EOSINOPHIL # BLD AUTO: 0.2 K/UL — SIGNIFICANT CHANGE UP (ref 0–0.5)
EOSINOPHIL NFR BLD AUTO: 4.8 % — SIGNIFICANT CHANGE UP (ref 0–6)
HCT VFR BLD CALC: 45 % — SIGNIFICANT CHANGE UP (ref 34.5–45)
HGB BLD-MCNC: 14.5 G/DL — SIGNIFICANT CHANGE UP (ref 11.5–15.5)
LYMPHOCYTES # BLD AUTO: 0.6 K/UL — LOW (ref 1–3.3)
LYMPHOCYTES # BLD AUTO: 17.2 % — SIGNIFICANT CHANGE UP (ref 13–44)
MCHC RBC-ENTMCNC: 29.3 PG — SIGNIFICANT CHANGE UP (ref 27–34)
MCHC RBC-ENTMCNC: 32.3 G/DL — SIGNIFICANT CHANGE UP (ref 32–36)
MCV RBC AUTO: 90.6 FL — SIGNIFICANT CHANGE UP (ref 80–100)
MONOCYTES # BLD AUTO: 0.4 K/UL — SIGNIFICANT CHANGE UP (ref 0–0.9)
MONOCYTES NFR BLD AUTO: 11.9 % — SIGNIFICANT CHANGE UP (ref 2–14)
NEUTROPHILS # BLD AUTO: 2.4 K/UL — SIGNIFICANT CHANGE UP (ref 1.8–7.4)
NEUTROPHILS NFR BLD AUTO: 64.1 % — SIGNIFICANT CHANGE UP (ref 43–77)
PLATELET # BLD AUTO: 97 K/UL — LOW (ref 150–400)
RBC # BLD: 4.97 M/UL — SIGNIFICANT CHANGE UP (ref 3.8–5.2)
RBC # FLD: 12.9 % — SIGNIFICANT CHANGE UP (ref 10.3–14.5)
WBC # BLD: 3.7 K/UL — LOW (ref 3.8–10.5)
WBC # FLD AUTO: 3.7 K/UL — LOW (ref 3.8–10.5)

## 2022-01-20 PROCEDURE — 99215 OFFICE O/P EST HI 40 MIN: CPT

## 2022-01-20 NOTE — PHYSICAL EXAM
[Normal] : affect appropriate [de-identified] : supple [de-identified] : clear [de-identified] : S1S2

## 2022-01-20 NOTE — ASSESSMENT
[FreeTextEntry1] : 60 year old postmenopausal female with stage IIA (T2) ER-/MT-/HER2 positive right breast cancer. S/p 6 cycles of neoadjuvant TCHP followed by bilateral mastectomy + BUNNY on 3/14/18, ypT0 ypN0 (pathologic complete response). Completed 1 year of Herceptin on 11/26/18. \par She developed R mastectomy flap recurrence in 10/2020, s/p excision on 11/6/20 -> 3.8 cm poorly differentiated IDC, ER-/MT-/HER2 positive. Completed breast RT 2/25/21.\par She's on adjuvant Kadcyla as of 1/11/21, dose reduced to 3 mg/kg in 7/2021 for elevated LFTs. Last cycle on 12/7/21. \par \par 01/05/2022 CT CAP: Mildly displaced fractures of the right sixth and seventh lateral ribs, new since 8/31/2021.\par Nonspecific mild right pleural thickening and focal thickening of the right major fissure of uncertain etiology.\par Stable appearance of the abdomen and pelvis since 8/31/2021.\par 01/05/2022 [facility]: No radionuclide evidence of osseous metastases.\par \par S/p last cycle of Kadcyla on 12/07/2021. \par 12/13/21 AST 65 U/L ALT 40 U/L Total Bilirubin 1.3 mg/dL \par Reviewed CT scans - rib fractures could be due to URI with severe cough, and pleural thickening may also be due to the same. CT scans were reviewed with reading radiologist, and these findings themselves are not suggestive of metastatic disease. \par \par Plan: \par -Leg Edema - Start Furosemide 20 mg daily, check ECHO now\par -elevated LFTs - CMP pending. \par -Repeat CT Chest in 1 month to to follow up on R pleural thickening\par -Discussed that there is no clear role for adjuvant neratinib in this setting as benefit was primarily seen in ER+/HER2 positive patients, and that too if given within 1 year of herceptin. Role of neratinib for ER-/HER2 positive disease and post Kadcyla is unclear. \par -Follow up in 1 month

## 2022-01-20 NOTE — HISTORY OF PRESENT ILLNESS
[de-identified] : Ms. Young was diagnosed with HER2 positive right breast cancer at age 56 in August 2017.\par \par She initially presented with right nipple yellowish discharge. Subsequent mammogram and ultrasound on 7/27/17 showed new spiculated mass in inner central posterior right breast with sonographic confirmation of suspicious mass at 3-4:00 region, and an indeterminate hypoechoic nodule at 1:00 right breast.\par \par On 8/3/17 she had right breast core biopsy of 3-4:00 mass --> pathology showed invasive poorly differentiated ductal carcinoma, Enzo grade 8/9, 0.9 cm--> ER-, MN-, HER2 IHC 3+ (positive)\par Right breast 1:00 --> fibrocystic change, proliferative type with nodular sclerosing adenosis\par \par 8/21/17 MRI breast - Biopsy clip and 3.8 cm area of irregular non mass enhancement in the right breast at the 3-4:00 location posteriorly, corresponding to recently\par diagnosed invasive carcinoma. Additional irregular areas of enhancement in the right breast at the 2-3:00 location and 8-9:00 location, suspicious for additional disease. Representative MR guided core biopsy of enhancement at the 8-9:00 location can be performed to evaluate for multicentric disease, if it would change\par management. 8 mm enhancing nodule demonstrating washout kinetics in the upper slightly inner mid left breast. MR guided core biopsy is advised.\par Clip artifact in the right breast at the 1:00 location marking site of biopsy-proven radial scar. No associated suspicious enhancement. Recommend\par appropriate surgical management. 2.1 cm oval T2 bright lesion in the liver, probable cyst or hemangioma.\par Recommend dedicated liver ultrasound for further evaluation.\par \par 8/28/17 MRI Core biopsy right breast lower outer quadrant --> DCIS, solid pattern, high grade atypia, focal necrosis. ER/MN pending\par Core biopsy left breast central inner --> mild proliferative fibrocystic change\par \par Treatment Summary\par 9/21/17 port placement\par 9/20/17 - 1/3/18 TCHP x 6 cycles (9/30/17 - 1/03/18\par 5/03/18 - 11/26/18 Trastuzumab (Herceptin) X every 3 weeks, to complete 52 weeks.\par She had breast reconstruction on 8/14/18 with nipple reconstruction.\par S/p port removal on 6/19/19.\par \par 3/14/18 - She underwent right skin sparing mastectomy with SNL biopsy and left risk reduction mastectomy with BUNNY reconstruction.\par 1- Left breast, mastectomy- Fibrocystic changes\par 2- Right sentinel lymph node, excision- Three negative lymph nodes (0/3)\par 3- Right sentinel lymph node 2, excision- One negative lymph node (0/1)\par 4- Right breast, mastectomy- No residual invasive carcinoma (s/p neoadjuvant treatment)- No residual DCIS (s/p neoadjuvant treatment)\par  Pathologic staging: ypT0, ypN0\par \par . \par \par Disease: right breast cancer \par Pathology: HER2 positive \par TNM stage: T2 \par \par \par Interval History: Patient returns for follow up. \par She had a R breast US on 10/20/20 for palpable R breast lump which showed 3.5 cm mass at 4:00, 9 cmfn. \par 10/26/20 US guided biopsy R breast 4:00 --Breast, right, 4 o'clock, 9 cm FN, ultrasound guided core biopsy\par - Invasive poorly differentiated ductal carcinoma withfocal necrosis and foci with micropapillary features\par - Enzo score 8/9 (3 + 3+ 2 )\par - Invasive tumor measures at least 1.5 cm\par -ER 0%, MN 0%, HER2 3+\par \par On 11/6/20 she underwent right mastectomy flap excision - IDC, poorly differentiated with necrosis, involvement of dermal skin and retraction artifact, 3.8 x 3.1 x 3.1\par -ER 0%, MN 0%, HER2 3+.\par \par Patient completed breast radiation 2/25/21\par Kadcyla last treatment 6/14/21 [de-identified] : Returns for follow up. S/p last cycle of Kadcyla on 12/07/2021 \par No coughing \par No fever \par No difficulty breathing\par No chest pain \par No recent falls  \par Reports recent leg swelling. Notes progresses throughout the day and resolves in the morning. No leg pain. \par Has h/o bronchitis in November when she had severe coughing spells. \par \par 01/05/2022 CT CAP: Mildly displaced fractures of the right sixth and seventh lateral ribs, new since 8/31/2021.\par Nonspecific mild right pleural thickening and focal thickening of the right major fissure of uncertain etiology.\par Stable appearance of the abdomen and pelvis since 8/31/2021.\par \par 01/05/2022 [facility]: No radionuclide evidence of osseous metastases.\par No significant interval change since the previous bone scan of 8/31/2021.\par \par 10/04/2021 ECHO: LV EF 70%\par  \par 08/31/2021 CT Chest and CT Abdomen pelvis\par No gross metastatic disease.\par Interval development of what appears to be mild fibrotic changes in the right lung anteriorly which is presumably post radiation. If there has been no interval treatment, infection may be included in the differential diagnosis, although considered less likely.\par Mild splenomegaly which appears have mildly increased. Please correlate clinically.\par \par 08/31/2021 [facility] Bone imaging total \par No scan evidence of osseous metastasis.\par Degenerative disease in the spine and major joints.\par No significant change compared to the previous bone scan of 11/2/2020.\par \par

## 2022-01-24 ENCOUNTER — APPOINTMENT (OUTPATIENT)
Dept: CARDIOLOGY | Facility: CLINIC | Age: 62
End: 2022-01-24
Payer: COMMERCIAL

## 2022-01-24 PROCEDURE — 76376 3D RENDER W/INTRP POSTPROCES: CPT

## 2022-01-24 PROCEDURE — 93306 TTE W/DOPPLER COMPLETE: CPT

## 2022-01-24 PROCEDURE — 93356 MYOCRD STRAIN IMG SPCKL TRCK: CPT

## 2022-01-28 LAB
ALBUMIN SERPL ELPH-MCNC: 4.2 G/DL
ALP BLD-CCNC: 158 U/L
ALT SERPL-CCNC: 36 U/L
ANION GAP SERPL CALC-SCNC: 14 MMOL/L
AST SERPL-CCNC: 55 U/L
BILIRUB SERPL-MCNC: 1.3 MG/DL
BUN SERPL-MCNC: 14 MG/DL
CALCIUM SERPL-MCNC: 9.5 MG/DL
CHLORIDE SERPL-SCNC: 105 MMOL/L
CO2 SERPL-SCNC: 24 MMOL/L
CREAT SERPL-MCNC: 0.71 MG/DL
GLUCOSE SERPL-MCNC: 93 MG/DL
MAGNESIUM SERPL-MCNC: 1.6 MG/DL
POTASSIUM SERPL-SCNC: 3.6 MMOL/L
PROT SERPL-MCNC: 6.4 G/DL
SODIUM SERPL-SCNC: 143 MMOL/L

## 2022-02-05 ENCOUNTER — OUTPATIENT (OUTPATIENT)
Dept: OUTPATIENT SERVICES | Facility: HOSPITAL | Age: 62
LOS: 1 days | End: 2022-02-05

## 2022-02-05 ENCOUNTER — APPOINTMENT (OUTPATIENT)
Dept: CT IMAGING | Facility: CLINIC | Age: 62
End: 2022-02-05
Payer: COMMERCIAL

## 2022-02-05 DIAGNOSIS — Z90.13 ACQUIRED ABSENCE OF BILATERAL BREASTS AND NIPPLES: Chronic | ICD-10-CM

## 2022-02-05 DIAGNOSIS — Z90.710 ACQUIRED ABSENCE OF BOTH CERVIX AND UTERUS: Chronic | ICD-10-CM

## 2022-02-05 DIAGNOSIS — C50.911 MALIGNANT NEOPLASM OF UNSPECIFIED SITE OF RIGHT FEMALE BREAST: ICD-10-CM

## 2022-02-05 DIAGNOSIS — Z95.828 PRESENCE OF OTHER VASCULAR IMPLANTS AND GRAFTS: Chronic | ICD-10-CM

## 2022-02-05 DIAGNOSIS — Z98.890 OTHER SPECIFIED POSTPROCEDURAL STATES: Chronic | ICD-10-CM

## 2022-02-05 DIAGNOSIS — Z98.891 HISTORY OF UTERINE SCAR FROM PREVIOUS SURGERY: Chronic | ICD-10-CM

## 2022-02-05 DIAGNOSIS — T82.9XXA UNSPECIFIED COMPLICATION OF CARDIAC AND VASCULAR PROSTHETIC DEVICE, IMPLANT AND GRAFT, INITIAL ENCOUNTER: Chronic | ICD-10-CM

## 2022-02-05 PROCEDURE — 71260 CT THORAX DX C+: CPT | Mod: 26

## 2022-02-08 ENCOUNTER — APPOINTMENT (OUTPATIENT)
Dept: DERMATOLOGY | Facility: CLINIC | Age: 62
End: 2022-02-08

## 2022-02-15 ENCOUNTER — NON-APPOINTMENT (OUTPATIENT)
Age: 62
End: 2022-02-15

## 2022-02-22 ENCOUNTER — APPOINTMENT (OUTPATIENT)
Dept: SURGERY | Facility: CLINIC | Age: 62
End: 2022-02-22

## 2022-02-22 ENCOUNTER — APPOINTMENT (OUTPATIENT)
Dept: BREAST CENTER | Facility: CLINIC | Age: 62
End: 2022-02-22
Payer: COMMERCIAL

## 2022-02-22 VITALS
DIASTOLIC BLOOD PRESSURE: 83 MMHG | SYSTOLIC BLOOD PRESSURE: 136 MMHG | HEART RATE: 78 BPM | HEIGHT: 61 IN | TEMPERATURE: 97.8 F | BODY MASS INDEX: 31.53 KG/M2 | WEIGHT: 167 LBS | OXYGEN SATURATION: 98 %

## 2022-02-22 DIAGNOSIS — Z90.13 ACQUIRED ABSENCE OF BILATERAL BREASTS AND NIPPLES: ICD-10-CM

## 2022-02-22 DIAGNOSIS — Z98.890 OTHER SPECIFIED POSTPROCEDURAL STATES: ICD-10-CM

## 2022-02-22 DIAGNOSIS — N63.10 UNSPECIFIED LUMP IN THE RIGHT BREAST, UNSPECIFIED QUADRANT: ICD-10-CM

## 2022-02-22 PROCEDURE — 99214 OFFICE O/P EST MOD 30 MIN: CPT

## 2022-03-02 LAB
ALBUMIN SERPL ELPH-MCNC: 4.3 G/DL
ALP BLD-CCNC: 161 U/L
ALT SERPL-CCNC: 36 U/L
ANION GAP SERPL CALC-SCNC: 12 MMOL/L
AST SERPL-CCNC: 47 U/L
BASOPHILS # BLD AUTO: 0.04 K/UL
BASOPHILS NFR BLD AUTO: 1.1 %
BILIRUB SERPL-MCNC: 1.2 MG/DL
BUN SERPL-MCNC: 14 MG/DL
CALCIUM SERPL-MCNC: 9.4 MG/DL
CHLORIDE SERPL-SCNC: 103 MMOL/L
CO2 SERPL-SCNC: 25 MMOL/L
CREAT SERPL-MCNC: 0.65 MG/DL
EGFR: 101 ML/MIN/1.73M2
EOSINOPHIL # BLD AUTO: 0.18 K/UL
EOSINOPHIL NFR BLD AUTO: 4.9 %
GLUCOSE SERPL-MCNC: 94 MG/DL
HCT VFR BLD CALC: 41 %
HGB BLD-MCNC: 13.5 G/DL
IMM GRANULOCYTES NFR BLD AUTO: 0 %
LYMPHOCYTES # BLD AUTO: 0.76 K/UL
LYMPHOCYTES NFR BLD AUTO: 20.7 %
MAN DIFF?: NORMAL
MCHC RBC-ENTMCNC: 29.7 PG
MCHC RBC-ENTMCNC: 32.9 GM/DL
MCV RBC AUTO: 90.1 FL
MONOCYTES # BLD AUTO: 0.47 K/UL
MONOCYTES NFR BLD AUTO: 12.8 %
NEUTROPHILS # BLD AUTO: 2.23 K/UL
NEUTROPHILS NFR BLD AUTO: 60.5 %
PLATELET # BLD AUTO: 106 K/UL
POTASSIUM SERPL-SCNC: 3.2 MMOL/L
PROT SERPL-MCNC: 6.6 G/DL
RBC # BLD: 4.55 M/UL
RBC # FLD: 13.7 %
SODIUM SERPL-SCNC: 140 MMOL/L
T3 SERPL-MCNC: 104 NG/DL
T4 FREE SERPL-MCNC: 1 NG/DL
TSH SERPL-ACNC: 3.42 UIU/ML
WBC # FLD AUTO: 3.68 K/UL

## 2022-03-08 ENCOUNTER — APPOINTMENT (OUTPATIENT)
Dept: ENDOCRINOLOGY | Facility: CLINIC | Age: 62
End: 2022-03-08
Payer: COMMERCIAL

## 2022-03-08 VITALS
RESPIRATION RATE: 14 BRPM | WEIGHT: 171.38 LBS | HEART RATE: 79 BPM | SYSTOLIC BLOOD PRESSURE: 126 MMHG | TEMPERATURE: 98.1 F | BODY MASS INDEX: 32.36 KG/M2 | DIASTOLIC BLOOD PRESSURE: 80 MMHG | HEIGHT: 61 IN | OXYGEN SATURATION: 99 %

## 2022-03-08 DIAGNOSIS — M81.0 AGE-RELATED OSTEOPOROSIS W/OUT CURRENT PATHOLOGICAL FRACTURE: ICD-10-CM

## 2022-03-08 PROCEDURE — 99214 OFFICE O/P EST MOD 30 MIN: CPT

## 2022-03-08 NOTE — ASSESSMENT
[Importance of Diet and Exercise] : importance of diet and exercise to improve glycemic control, achieve weight loss and improve cardiovascular health [Exercise/Effect on Glucose] : exercise/effect on glucose [Weight Loss] : weight loss [FreeTextEntry1] : NTMNG in patient with breast Ca 2017 and recurrent disease in 2020 s/p XRT, chemoT and surgery. \par declines compressive sx . \par multiple subcentimeter colloid cysts all stable . \par pt euthyroid clinically and biochemically.\par \par obesity: gave up Noom. .  \par encouraged more exercise  bike 2 x week and advsied to increase. \par NAFDL? : monitor LFts. fatty liver? vs immunotherapy done for breast Ca. \par \par vitamin d deficiency: continue  vitamin d 1000 u qd \par Osteoporosis postmenopausal ? : check DXA especially after chemoT for breast Ca  \par \par hLD :  check lipids. \par \par HTN :  bp at target on meds. Continue current management.\par I advised low fat/low cholesterol diet, low salt diet\par \par

## 2022-03-21 PROBLEM — N63.10 BREAST MASS, RIGHT: Status: ACTIVE | Noted: 2020-10-20

## 2022-03-21 PROBLEM — Z98.890 S/P LYMPH NODE BIOPSY: Status: ACTIVE | Noted: 2018-04-03

## 2022-03-21 PROBLEM — Z90.13 S/P BILATERAL MASTECTOMY: Status: ACTIVE | Noted: 2018-04-03

## 2022-03-21 NOTE — PHYSICAL EXAM
[Normocephalic] : normocephalic [Atraumatic] : atraumatic [PERRL] : pupils equal, round and reactive to light [Sclera nonicteric] : sclera nonicteric [Supple] : supple [No Supraclavicular Adenopathy] : no supraclavicular adenopathy [Examined in the supine and seated position] : examined in the supine and seated position [No dominant masses] : no dominant masses in right breast  [No dominant masses] : no dominant masses left breast [No Nipple Retraction] : no left nipple retraction [No Nipple Discharge] : no left nipple discharge [No Axillary Lymphadenopathy] : no left axillary lymphadenopathy [No Edema] : no edema [No Rashes] : no rashes [No Ulceration] : no ulceration [de-identified] : flap health and well integrated  no evidence of hematoma seroma or infection\par  [de-identified] : flap health and well integrated  no evidence of hematoma seroma or infection\par

## 2022-03-21 NOTE — HISTORY OF PRESENT ILLNESS
[FreeTextEntry1] : I had the pleasure of seeing Monisha Young in the office today for a follow up visit.\par \par Monisha is a diana 62 yo postmenopausal female. She is s/p bilateral mastectomy with right SLNB for [id number] positive cancer after receiving neoadjuvant chemotherapy 3/14/2018.\par \par She underwent right breast US on 10/20/2020 after palpable a right breast lump.  US demonstrated a 3.5 cm mass in the right breast 4:00 9 cmfn with recommendation of biopsy.  Biopsy was performed on 10/26/20 and demonstrated right breast IDC grade 3 ER 0% ID 0% Her2 positive.  CT C/A/P was preformed and demonstrated the 3.1 cm mass and borderline right axilla lymph node.  She was then sent for right axilla US and US came back with benign lymph node.  She went to the OR on 11/6/2020 of right breast reexcision of mastectomy.  Final pathology demonstrated 3.8 cm with involvement of dermal skin and retraction.  Lymphovascular invasion present.  ER 0% ID 0% Her2 positive.\par \par Genetics: color: Negative\par PMRT- 3/2021\par  \par 3/14/2018 [facility]\par Left breast, mastectomy\par -Fibrocystic changes\par Right sentinel lymph node, excision\par -Three negative lymph nodes 0/3\par Right sentinel lymph node 2, excision \par -One negative lymph node 0/1\par Right breast, mastectomy\par -No residual invasive carcinoma s/p neoadjuvant treatment\par No residual DCIS s/p neoadjuvant treatment\par Biopsy site changes\par Radial scar\par Proliferative fibrocystic changes\par Pathologic staging: ypT0, ypNo\par \par 10/20/2020  US breast complete RT\par Impression:  No suspicious mass at the area of the lump/  Ultrasound guided biopsy recommended.\par \par 10/26/2020  Pathology\par breast right 4 o'clcok, 9 cmfn, ultrasound guided core biopsy:  Invasive poorly differentiated ductal carcinoma with focal necrosis and foci with micropapillary features.  [place] score 8/9.  Invasive tumor measures at least 1.5 cm.  ER 0% ID 0 % Her2 negative.\par \par 11/2/2020  CT Chest/Abdomen/Pelvis\par IMPRESSION:Right breast mass corresponding to known carcinoma recurrence.\par Borderline right axillary lymph node.No metastatic lesion otherwise visualized.\par \par 11/2/2020  NM Bone IMG Whole Body\par Impression:  No radionuclide evidence of osseous metastases.\par \par 11/3/2020  US breast limited RT\par Impression:  Benign appearing right axillary lymph nodes.  Recommend surgical/oncologic management.  BIRADS 2 Benign findings.\par \par 11/6/2020  Surgical Pathology\par 1.  tissue (right breast mastectomy flap, medial magin):  Negative for carcinoma.\par 2.  Tissue (rightmastectomy flap):  Infiltrating ductal carcinoma, poorly differentiated with necrosis, involvement of dermal skin and retraction artifact, measuring 3.8 x 3.1 x 3.1 cm (see comment).  Chronic inflammation, foreign body giant cell reaction, organizing fat necrosis and fibrosis consitent with surgical changes.\par 3.  Tissue (right breast mastectomy flap, superior scar tissue):  Lymphovascular invasion present.  Patchy chronic inflammation, foreign body giant cell reaction, organizing fat necrosis and fibrosis consistent with surgical changes.\par 4.  Tissue (right breast mastectomy flap, medial margin):  Negative for carcinoma.\par 5.  Tissue ( right breast mastectomy flap, superior margin):  Negative for carcinoma.\par 6.  Tissue (right breast mastectomy flap, lateral margin):  Negative for carcinoma.\par 7.  Tissue (right breast mastectomy flap, inferior margin):  Negative for carcinoma.\par 8.  Tissue (right breast mastectomy flap, deep posterior margin):  Negative for carcinoma.\par 9.  Tissue ( right breast mastectomy, additional medial skin margin):  Negative for carcinoma\par \par 8/31/2021  CT chest abdomen pelvis\par IMPRESSION:  No gross metastatic disease.\par Interval development of what appears to be mild fibrotic changes in the right lung anteriorly which is presumably post radiation. If there has been no interval treatment, infection may be included in the differential diagnosis, although considered less likely.\par Mild splenomegaly which appears have mildly increased. Please correlate clinically.\par \par We reviewed clinical breast exam and she is well healed.  Recommendation for followup in 4 months.\par \par She understands and agrees with plan.  All questions answered.\par \par

## 2022-03-21 NOTE — ASSESSMENT
[FreeTextEntry1] : 60 yo with ipsilateral tumor recurrence (rpT2N0 grade 3 ER-SD-HER2+), s/p right mastectomy flap excision on 11/6/2020.  She completed PMRT 3/2021.  She was initially diagnosed with IDC right breast in 8/2017 s/p neoadjuvant chemotherapy followed by bilateral mastectomy with right SLNB and BUNNY on 3/14/85012 showing complete response and completed 1 year of Herceptin.  Recommendation for:\par 1.  Follow up with Plastics ( will follow up with  for revision surgery)\par 2.  Follow up with medical oncology\par 3.  Follow up with radiation oncology\par 4.  Follow up in 4 months

## 2022-04-20 ENCOUNTER — APPOINTMENT (OUTPATIENT)
Dept: MAMMOGRAPHY | Facility: CLINIC | Age: 62
End: 2022-04-20

## 2022-04-20 ENCOUNTER — APPOINTMENT (OUTPATIENT)
Dept: ULTRASOUND IMAGING | Facility: CLINIC | Age: 62
End: 2022-04-20

## 2022-04-21 ENCOUNTER — OUTPATIENT (OUTPATIENT)
Dept: OUTPATIENT SERVICES | Facility: HOSPITAL | Age: 62
LOS: 1 days | Discharge: HOME | End: 2022-04-21

## 2022-04-21 DIAGNOSIS — Z90.710 ACQUIRED ABSENCE OF BOTH CERVIX AND UTERUS: Chronic | ICD-10-CM

## 2022-04-21 DIAGNOSIS — Z98.890 OTHER SPECIFIED POSTPROCEDURAL STATES: Chronic | ICD-10-CM

## 2022-04-21 DIAGNOSIS — Z90.13 ACQUIRED ABSENCE OF BILATERAL BREASTS AND NIPPLES: Chronic | ICD-10-CM

## 2022-04-21 DIAGNOSIS — Z98.891 HISTORY OF UTERINE SCAR FROM PREVIOUS SURGERY: Chronic | ICD-10-CM

## 2022-04-21 DIAGNOSIS — Z95.828 PRESENCE OF OTHER VASCULAR IMPLANTS AND GRAFTS: Chronic | ICD-10-CM

## 2022-04-21 DIAGNOSIS — C50.919 MALIGNANT NEOPLASM OF UNSPECIFIED SITE OF UNSPECIFIED FEMALE BREAST: ICD-10-CM

## 2022-04-21 DIAGNOSIS — T82.9XXA UNSPECIFIED COMPLICATION OF CARDIAC AND VASCULAR PROSTHETIC DEVICE, IMPLANT AND GRAFT, INITIAL ENCOUNTER: Chronic | ICD-10-CM

## 2022-04-27 ENCOUNTER — APPOINTMENT (OUTPATIENT)
Dept: HEMATOLOGY ONCOLOGY | Facility: CLINIC | Age: 62
End: 2022-04-27
Payer: COMMERCIAL

## 2022-04-27 ENCOUNTER — LABORATORY RESULT (OUTPATIENT)
Age: 62
End: 2022-04-27

## 2022-04-27 ENCOUNTER — RESULT REVIEW (OUTPATIENT)
Age: 62
End: 2022-04-27

## 2022-04-27 VITALS
BODY MASS INDEX: 33.61 KG/M2 | OXYGEN SATURATION: 99 % | SYSTOLIC BLOOD PRESSURE: 165 MMHG | HEART RATE: 77 BPM | HEIGHT: 61 IN | DIASTOLIC BLOOD PRESSURE: 100 MMHG | WEIGHT: 178 LBS

## 2022-04-27 LAB
BASOPHILS # BLD AUTO: 0.1 K/UL — SIGNIFICANT CHANGE UP (ref 0–0.2)
BASOPHILS NFR BLD AUTO: 1.8 % — SIGNIFICANT CHANGE UP (ref 0–2)
EOSINOPHIL # BLD AUTO: 0.2 K/UL — SIGNIFICANT CHANGE UP (ref 0–0.5)
EOSINOPHIL NFR BLD AUTO: 5.7 % — SIGNIFICANT CHANGE UP (ref 0–6)
HCT VFR BLD CALC: 46.1 % — HIGH (ref 34.5–45)
HGB BLD-MCNC: 15.2 G/DL — SIGNIFICANT CHANGE UP (ref 11.5–15.5)
LYMPHOCYTES # BLD AUTO: 0.6 K/UL — LOW (ref 1–3.3)
LYMPHOCYTES # BLD AUTO: 19.4 % — SIGNIFICANT CHANGE UP (ref 13–44)
MCHC RBC-ENTMCNC: 29.8 PG — SIGNIFICANT CHANGE UP (ref 27–34)
MCHC RBC-ENTMCNC: 32.9 G/DL — SIGNIFICANT CHANGE UP (ref 32–36)
MCV RBC AUTO: 90.6 FL — SIGNIFICANT CHANGE UP (ref 80–100)
MONOCYTES # BLD AUTO: 0.4 K/UL — SIGNIFICANT CHANGE UP (ref 0–0.9)
MONOCYTES NFR BLD AUTO: 11 % — SIGNIFICANT CHANGE UP (ref 2–14)
NEUTROPHILS # BLD AUTO: 2 K/UL — SIGNIFICANT CHANGE UP (ref 1.8–7.4)
NEUTROPHILS NFR BLD AUTO: 62 % — SIGNIFICANT CHANGE UP (ref 43–77)
PLATELET # BLD AUTO: 107 K/UL — LOW (ref 150–400)
RBC # BLD: 5.09 M/UL — SIGNIFICANT CHANGE UP (ref 3.8–5.2)
RBC # FLD: 12.3 % — SIGNIFICANT CHANGE UP (ref 10.3–14.5)
WBC # BLD: 3.3 K/UL — LOW (ref 3.8–10.5)
WBC # FLD AUTO: 3.3 K/UL — LOW (ref 3.8–10.5)

## 2022-04-27 PROCEDURE — 99214 OFFICE O/P EST MOD 30 MIN: CPT

## 2022-04-27 RX ORDER — AMLODIPINE BESYLATE 10 MG/1
10 TABLET ORAL
Qty: 30 | Refills: 0 | Status: DISCONTINUED | COMMUNITY
Start: 2021-04-08 | End: 2022-04-27

## 2022-04-27 RX ORDER — FUROSEMIDE 20 MG/1
20 TABLET ORAL DAILY
Qty: 3 | Refills: 0 | Status: DISCONTINUED | COMMUNITY
Start: 2022-01-20 | End: 2022-04-27

## 2022-04-27 NOTE — HISTORY OF PRESENT ILLNESS
[de-identified] : Ms. Young was diagnosed with HER2 positive right breast cancer at age 56 in August 2017.\par \par She initially presented with right nipple yellowish discharge. Subsequent mammogram and ultrasound on 7/27/17 showed new spiculated mass in inner central posterior right breast with sonographic confirmation of suspicious mass at 3-4:00 region, and an indeterminate hypoechoic nodule at 1:00 right breast.\par \par On 8/3/17 she had right breast core biopsy of 3-4:00 mass --> pathology showed invasive poorly differentiated ductal carcinoma, Enzo grade 8/9, 0.9 cm--> ER-, WI-, HER2 IHC 3+ (positive)\par Right breast 1:00 --> fibrocystic change, proliferative type with nodular sclerosing adenosis\par \par 8/21/17 MRI breast - Biopsy clip and 3.8 cm area of irregular non mass enhancement in the right breast at the 3-4:00 location posteriorly, corresponding to recently\par diagnosed invasive carcinoma. Additional irregular areas of enhancement in the right breast at the 2-3:00 location and 8-9:00 location, suspicious for additional disease. Representative MR guided core biopsy of enhancement at the 8-9:00 location can be performed to evaluate for multicentric disease, if it would change\par management. 8 mm enhancing nodule demonstrating washout kinetics in the upper slightly inner mid left breast. MR guided core biopsy is advised.\par Clip artifact in the right breast at the 1:00 location marking site of biopsy-proven radial scar. No associated suspicious enhancement. Recommend\par appropriate surgical management. 2.1 cm oval T2 bright lesion in the liver, probable cyst or hemangioma.\par Recommend dedicated liver ultrasound for further evaluation.\par \par 8/28/17 MRI Core biopsy right breast lower outer quadrant --> DCIS, solid pattern, high grade atypia, focal necrosis. ER/WI pending\par Core biopsy left breast central inner --> mild proliferative fibrocystic change\par \par Treatment Summary\par 9/21/17 port placement\par 9/20/17 - 1/3/18 TCHP x 6 cycles (9/30/17 - 1/03/18\par 5/03/18 - 11/26/18 Trastuzumab (Herceptin) X every 3 weeks, to complete 52 weeks.\par She had breast reconstruction on 8/14/18 with nipple reconstruction.\par S/p port removal on 6/19/19.\par \par 3/14/18 - She underwent right skin sparing mastectomy with SNL biopsy and left risk reduction mastectomy with BUNNY reconstruction.\par 1- Left breast, mastectomy- Fibrocystic changes\par 2- Right sentinel lymph node, excision- Three negative lymph nodes (0/3)\par 3- Right sentinel lymph node 2, excision- One negative lymph node (0/1)\par 4- Right breast, mastectomy- No residual invasive carcinoma (s/p neoadjuvant treatment)- No residual DCIS (s/p neoadjuvant treatment)\par  Pathologic staging: ypT0, ypN0\par \par . \par \par Disease: right breast cancer \par Pathology: HER2 positive \par TNM stage: T2 \par \par \par Interval History: Patient returns for follow up. \par She had a R breast US on 10/20/20 for palpable R breast lump which showed 3.5 cm mass at 4:00, 9 cmfn. \par 10/26/20 US guided biopsy R breast 4:00 --Breast, right, 4 o'clock, 9 cm FN, ultrasound guided core biopsy\par - Invasive poorly differentiated ductal carcinoma withfocal necrosis and foci with micropapillary features\par - Enzo score 8/9 (3 + 3+ 2 )\par - Invasive tumor measures at least 1.5 cm\par -ER 0%, WI 0%, HER2 3+\par \par On 11/6/20 she underwent right mastectomy flap excision - IDC, poorly differentiated with necrosis, involvement of dermal skin and retraction artifact, 3.8 x 3.1 x 3.1\par -ER 0%, WI 0%, HER2 3+.\par \par Patient completed breast radiation 2/25/21\par Kadcyla last treatment 6/14/21 [de-identified] : Returns for follow up.\par Denies headache, dizziness\par Has bilateral knee pain, exacerbated from sitting to standing position \par Recalls hair thinning in the last 3 weeks, taking biotin / MVI\par Denies recent stressors \par Reports PCP discontinued amlodipine due to leg swelling\par Denies abdominal pain\par Denies blood in stool, black stool

## 2022-04-27 NOTE — PHYSICAL EXAM
[Normal] : affect appropriate [de-identified] : supple [de-identified] : clear [de-identified] : S1S2 [de-identified] : Post surgical/RT changes in reconstructed R breast. No palpable mass or axillary adenopathy.

## 2022-06-06 LAB
ALBUMIN SERPL ELPH-MCNC: 4.1 G/DL
ALP BLD-CCNC: 167 U/L
ALT SERPL-CCNC: 46 U/L
ANION GAP SERPL CALC-SCNC: 12 MMOL/L
AST SERPL-CCNC: 52 U/L
BILIRUB SERPL-MCNC: 0.8 MG/DL
BUN SERPL-MCNC: 14 MG/DL
CALCIUM SERPL-MCNC: 9.4 MG/DL
CHLORIDE SERPL-SCNC: 103 MMOL/L
CO2 SERPL-SCNC: 27 MMOL/L
CREAT SERPL-MCNC: 0.67 MG/DL
EGFR: 101 ML/MIN/1.73M2
GLUCOSE SERPL-MCNC: 98 MG/DL
MAGNESIUM SERPL-MCNC: 1.6 MG/DL
POTASSIUM SERPL-SCNC: 3.2 MMOL/L
PROT SERPL-MCNC: 6.7 G/DL
SODIUM SERPL-SCNC: 142 MMOL/L

## 2022-06-15 ENCOUNTER — NON-APPOINTMENT (OUTPATIENT)
Age: 62
End: 2022-06-15

## 2022-06-29 ENCOUNTER — NON-APPOINTMENT (OUTPATIENT)
Age: 62
End: 2022-06-29

## 2022-06-29 ENCOUNTER — APPOINTMENT (OUTPATIENT)
Dept: RADIATION ONCOLOGY | Facility: CLINIC | Age: 62
End: 2022-06-29

## 2022-07-20 ENCOUNTER — APPOINTMENT (OUTPATIENT)
Dept: RADIATION ONCOLOGY | Facility: CLINIC | Age: 62
End: 2022-07-20

## 2022-07-20 ENCOUNTER — NON-APPOINTMENT (OUTPATIENT)
Age: 62
End: 2022-07-20

## 2022-07-20 NOTE — HISTORY OF PRESENT ILLNESS
[FreeTextEntry1] : 61 year old woman  returns today s/p 5,000cGY for a [id number] llA IDc right breast completed 2/25/21. pain,edema, itch, arm edema, fatigue & weight loss\par Dr Reddy 4/27/22 next 8/23/22 \par DR Gutierrez  8/23/22\par 10/4/21  TTE \par Dr Murillo (endo) 9/7/22\par GYN?\par breast imaging?\par

## 2022-08-18 ENCOUNTER — OUTPATIENT (OUTPATIENT)
Dept: OUTPATIENT SERVICES | Facility: HOSPITAL | Age: 62
LOS: 1 days | Discharge: HOME | End: 2022-08-18

## 2022-08-18 DIAGNOSIS — C50.919 MALIGNANT NEOPLASM OF UNSPECIFIED SITE OF UNSPECIFIED FEMALE BREAST: ICD-10-CM

## 2022-08-18 DIAGNOSIS — T82.9XXA UNSPECIFIED COMPLICATION OF CARDIAC AND VASCULAR PROSTHETIC DEVICE, IMPLANT AND GRAFT, INITIAL ENCOUNTER: Chronic | ICD-10-CM

## 2022-08-18 DIAGNOSIS — Z98.890 OTHER SPECIFIED POSTPROCEDURAL STATES: Chronic | ICD-10-CM

## 2022-08-18 DIAGNOSIS — Z90.13 ACQUIRED ABSENCE OF BILATERAL BREASTS AND NIPPLES: Chronic | ICD-10-CM

## 2022-08-18 DIAGNOSIS — Z95.828 PRESENCE OF OTHER VASCULAR IMPLANTS AND GRAFTS: Chronic | ICD-10-CM

## 2022-08-18 DIAGNOSIS — Z90.710 ACQUIRED ABSENCE OF BOTH CERVIX AND UTERUS: Chronic | ICD-10-CM

## 2022-08-18 DIAGNOSIS — Z98.891 HISTORY OF UTERINE SCAR FROM PREVIOUS SURGERY: Chronic | ICD-10-CM

## 2022-08-23 ENCOUNTER — RESULT REVIEW (OUTPATIENT)
Age: 62
End: 2022-08-23

## 2022-08-23 ENCOUNTER — APPOINTMENT (OUTPATIENT)
Dept: HEMATOLOGY ONCOLOGY | Facility: CLINIC | Age: 62
End: 2022-08-23

## 2022-08-23 VITALS
OXYGEN SATURATION: 99 % | DIASTOLIC BLOOD PRESSURE: 84 MMHG | BODY MASS INDEX: 34.93 KG/M2 | SYSTOLIC BLOOD PRESSURE: 158 MMHG | HEIGHT: 61 IN | HEART RATE: 69 BPM | WEIGHT: 185.01 LBS

## 2022-08-23 DIAGNOSIS — D69.6 THROMBOCYTOPENIA, UNSPECIFIED: ICD-10-CM

## 2022-08-23 DIAGNOSIS — R74.8 ABNORMAL LEVELS OF OTHER SERUM ENZYMES: ICD-10-CM

## 2022-08-23 LAB
BASOPHILS # BLD AUTO: 0.1 K/UL — SIGNIFICANT CHANGE UP (ref 0–0.2)
BASOPHILS NFR BLD AUTO: 1.5 % — SIGNIFICANT CHANGE UP (ref 0–2)
EOSINOPHIL # BLD AUTO: 0.2 K/UL — SIGNIFICANT CHANGE UP (ref 0–0.5)
EOSINOPHIL NFR BLD AUTO: 5 % — SIGNIFICANT CHANGE UP (ref 0–6)
HCT VFR BLD CALC: 42.6 % — SIGNIFICANT CHANGE UP (ref 34.5–45)
HGB BLD-MCNC: 14.7 G/DL — SIGNIFICANT CHANGE UP (ref 11.5–15.5)
LYMPHOCYTES # BLD AUTO: 0.7 K/UL — LOW (ref 1–3.3)
LYMPHOCYTES # BLD AUTO: 18.9 % — SIGNIFICANT CHANGE UP (ref 13–44)
MCHC RBC-ENTMCNC: 30.3 PG — SIGNIFICANT CHANGE UP (ref 27–34)
MCHC RBC-ENTMCNC: 34.4 G/DL — SIGNIFICANT CHANGE UP (ref 32–36)
MCV RBC AUTO: 88.1 FL — SIGNIFICANT CHANGE UP (ref 80–100)
MONOCYTES # BLD AUTO: 0.4 K/UL — SIGNIFICANT CHANGE UP (ref 0–0.9)
MONOCYTES NFR BLD AUTO: 10.9 % — SIGNIFICANT CHANGE UP (ref 2–14)
NEUTROPHILS # BLD AUTO: 2.3 K/UL — SIGNIFICANT CHANGE UP (ref 1.8–7.4)
NEUTROPHILS NFR BLD AUTO: 63.7 % — SIGNIFICANT CHANGE UP (ref 43–77)
PLATELET # BLD AUTO: 108 K/UL — LOW (ref 150–400)
RBC # BLD: 4.83 M/UL — SIGNIFICANT CHANGE UP (ref 3.8–5.2)
RBC # FLD: 11 % — SIGNIFICANT CHANGE UP (ref 10.3–14.5)
WBC # BLD: 3.7 K/UL — LOW (ref 3.8–10.5)
WBC # FLD AUTO: 3.7 K/UL — LOW (ref 3.8–10.5)

## 2022-08-23 PROCEDURE — 99214 OFFICE O/P EST MOD 30 MIN: CPT

## 2022-08-23 RX ORDER — POTASSIUM CHLORIDE 1500 MG/1
20 TABLET, FILM COATED, EXTENDED RELEASE ORAL DAILY
Qty: 2 | Refills: 0 | Status: DISCONTINUED | COMMUNITY
Start: 2021-08-02 | End: 2022-08-23

## 2022-08-23 RX ORDER — PROCHLORPERAZINE MALEATE 10 MG/1
10 TABLET ORAL EVERY 6 HOURS
Qty: 30 | Refills: 1 | Status: DISCONTINUED | COMMUNITY
Start: 2021-02-04 | End: 2022-08-23

## 2022-08-23 RX ORDER — CHOLECALCIFEROL (VITAMIN D3) 10(400)/ML
160 (50 FE) DROPS ORAL
Refills: 0 | Status: ACTIVE | COMMUNITY
Start: 2022-08-23

## 2022-08-23 NOTE — PHYSICAL EXAM
[Normal] : affect appropriate [de-identified] : supple [de-identified] : clear [de-identified] : S1S2 [de-identified] : Post surgical/RT changes in reconstructed R breast. No palpable mass or axillary adenopathy.

## 2022-08-23 NOTE — HISTORY OF PRESENT ILLNESS
[de-identified] : Ms. Young was diagnosed with HER2 positive right breast cancer at age 56 in August 2017.\par \par She initially presented with right nipple yellowish discharge. Subsequent mammogram and ultrasound on 7/27/17 showed new spiculated mass in inner central posterior right breast with sonographic confirmation of suspicious mass at 3-4:00 region, and an indeterminate hypoechoic nodule at 1:00 right breast.\par \par On 8/3/17 she had right breast core biopsy of 3-4:00 mass --> pathology showed invasive poorly differentiated ductal carcinoma, Enzo grade 8/9, 0.9 cm--> ER-, NC-, HER2 IHC 3+ (positive)\par Right breast 1:00 --> fibrocystic change, proliferative type with nodular sclerosing adenosis\par \par 8/21/17 MRI breast - Biopsy clip and 3.8 cm area of irregular non mass enhancement in the right breast at the 3-4:00 location posteriorly, corresponding to recently\par diagnosed invasive carcinoma. Additional irregular areas of enhancement in the right breast at the 2-3:00 location and 8-9:00 location, suspicious for additional disease. Representative MR guided core biopsy of enhancement at the 8-9:00 location can be performed to evaluate for multicentric disease, if it would change\par management. 8 mm enhancing nodule demonstrating washout kinetics in the upper slightly inner mid left breast. MR guided core biopsy is advised.\par Clip artifact in the right breast at the 1:00 location marking site of biopsy-proven radial scar. No associated suspicious enhancement. Recommend\par appropriate surgical management. 2.1 cm oval T2 bright lesion in the liver, probable cyst or hemangioma.\par Recommend dedicated liver ultrasound for further evaluation.\par \par 8/28/17 MRI Core biopsy right breast lower outer quadrant --> DCIS, solid pattern, high grade atypia, focal necrosis. ER/NC pending\par Core biopsy left breast central inner --> mild proliferative fibrocystic change\par \par Treatment Summary\par 9/21/17 port placement\par 9/20/17 - 1/3/18 TCHP x 6 cycles (9/30/17 - 1/03/18\par 5/03/18 - 11/26/18 Trastuzumab (Herceptin) X every 3 weeks, to complete 52 weeks.\par She had breast reconstruction on 8/14/18 with nipple reconstruction.\par S/p port removal on 6/19/19.\par \par 3/14/18 - She underwent right skin sparing mastectomy with SNL biopsy and left risk reduction mastectomy with BUNNY reconstruction.\par 1- Left breast, mastectomy- Fibrocystic changes\par 2- Right sentinel lymph node, excision- Three negative lymph nodes (0/3)\par 3- Right sentinel lymph node 2, excision- One negative lymph node (0/1)\par 4- Right breast, mastectomy- No residual invasive carcinoma (s/p neoadjuvant treatment)- No residual DCIS (s/p neoadjuvant treatment)\par  Pathologic staging: ypT0, ypN0\par \par . \par \par Disease: right breast cancer \par Pathology: HER2 positive \par TNM stage: T2 \par \par \par Interval History: Patient returns for follow up. \par She had a R breast US on 10/20/20 for palpable R breast lump which showed 3.5 cm mass at 4:00, 9 cmfn. \par 10/26/20 US guided biopsy R breast 4:00 --Breast, right, 4 o'clock, 9 cm FN, ultrasound guided core biopsy\par - Invasive poorly differentiated ductal carcinoma withfocal necrosis and foci with micropapillary features\par - Enzo score 8/9 (3 + 3+ 2 )\par - Invasive tumor measures at least 1.5 cm\par -ER 0%, NC 0%, HER2 3+\par \par On 11/6/20 she underwent right mastectomy flap excision - IDC, poorly differentiated with necrosis, involvement of dermal skin and retraction artifact, 3.8 x 3.1 x 3.1\par -ER 0%, NC 0%, HER2 3+.\par \par Patient completed breast radiation 2/25/21\par Kadcyla last treatment 6/14/21 [de-identified] : Returns for follow up.\par No headache\par No dizziness\par No dyspnea \par No diarrhea \par S/p right breast fat grafting with plastic surgeon Dr. Burleson in 6/2022 \par Good appetite, resolved dysgeusia, notes weight gain \par Feels well

## 2022-08-23 NOTE — ASSESSMENT
[FreeTextEntry1] : 61 year old postmenopausal female with stage IIA (T2) ER-/MN-/HER2 positive right breast cancer. S/p 6 cycles of neoadjuvant TCHP followed by bilateral mastectomy + BUNNY on 3/14/18, ypT0 ypN0 (pathologic complete response). Completed 1 year of Herceptin on 11/26/18. \par She developed R mastectomy flap recurrence in 10/2020, s/p excision on 11/6/20 -> 3.8 cm poorly differentiated IDC, ER-/MN-/HER2 positive. Completed breast RT 2/25/21.\par She received adjuvant Kadcyla 1/11/21, dose reduced to 3 mg/kg in 7/2021 for elevated LFTs. Last cycle on 12/7/21. Discussed that there is no clear role for adjuvant neratinib in this setting as benefit was primarily seen in ER+/HER2 positive patients, and that too if given within 1 year of herceptin. Role of neratinib for ER-/HER2 positive disease and post Kadcyla is unclear. \par 02/05/2022 CT Chest -Interval resolution of right pleural and fissural thickening compared to 1/5/2022.\par 1/24/2022 Echo- LV EF 63% \par \par Reviewed CBC w dif from today WBC 3.7 K HGB 14.7 g HCT 42.6 % PLT 108K Neutrophil # 2.3 K \par She feels well overall.\par \par Plan: \par -Thrombocytopenia- PLT 108K stable. \par -Elevated LFTs-?post Kadcyla, however she is now 8 months out since completion.CMP pending, if still elevated will send referral to hepatology, CT Abdomen/Pelvis ordered.\par -Follow up in 4 months

## 2022-08-29 ENCOUNTER — RESULT REVIEW (OUTPATIENT)
Age: 62
End: 2022-08-29

## 2022-09-07 ENCOUNTER — APPOINTMENT (OUTPATIENT)
Dept: ENDOCRINOLOGY | Facility: CLINIC | Age: 62
End: 2022-09-07

## 2022-09-09 LAB
ALBUMIN SERPL ELPH-MCNC: 4.3 G/DL
ALP BLD-CCNC: 109 U/L
ALT SERPL-CCNC: 40 U/L
ANION GAP SERPL CALC-SCNC: 12 MMOL/L
AST SERPL-CCNC: 45 U/L
BILIRUB SERPL-MCNC: 1 MG/DL
BUN SERPL-MCNC: 16 MG/DL
CALCIUM SERPL-MCNC: 9.6 MG/DL
CHLORIDE SERPL-SCNC: 102 MMOL/L
CO2 SERPL-SCNC: 28 MMOL/L
CREAT SERPL-MCNC: 0.69 MG/DL
EGFR: 100 ML/MIN/1.73M2
GLUCOSE SERPL-MCNC: 88 MG/DL
POTASSIUM SERPL-SCNC: 3.3 MMOL/L
PROT SERPL-MCNC: 6.7 G/DL
SODIUM SERPL-SCNC: 142 MMOL/L

## 2022-09-10 ENCOUNTER — APPOINTMENT (OUTPATIENT)
Dept: CT IMAGING | Facility: CLINIC | Age: 62
End: 2022-09-10

## 2022-09-10 ENCOUNTER — OUTPATIENT (OUTPATIENT)
Dept: OUTPATIENT SERVICES | Facility: HOSPITAL | Age: 62
LOS: 1 days | End: 2022-09-10

## 2022-09-10 DIAGNOSIS — Z98.891 HISTORY OF UTERINE SCAR FROM PREVIOUS SURGERY: Chronic | ICD-10-CM

## 2022-09-10 DIAGNOSIS — Z90.13 ACQUIRED ABSENCE OF BILATERAL BREASTS AND NIPPLES: Chronic | ICD-10-CM

## 2022-09-10 DIAGNOSIS — Z98.890 OTHER SPECIFIED POSTPROCEDURAL STATES: Chronic | ICD-10-CM

## 2022-09-10 DIAGNOSIS — Z95.828 PRESENCE OF OTHER VASCULAR IMPLANTS AND GRAFTS: Chronic | ICD-10-CM

## 2022-09-10 DIAGNOSIS — Z00.8 ENCOUNTER FOR OTHER GENERAL EXAMINATION: ICD-10-CM

## 2022-09-10 DIAGNOSIS — T82.9XXA UNSPECIFIED COMPLICATION OF CARDIAC AND VASCULAR PROSTHETIC DEVICE, IMPLANT AND GRAFT, INITIAL ENCOUNTER: Chronic | ICD-10-CM

## 2022-09-10 DIAGNOSIS — Z90.710 ACQUIRED ABSENCE OF BOTH CERVIX AND UTERUS: Chronic | ICD-10-CM

## 2022-09-10 PROCEDURE — 74177 CT ABD & PELVIS W/CONTRAST: CPT | Mod: 26

## 2022-09-14 ENCOUNTER — NON-APPOINTMENT (OUTPATIENT)
Age: 62
End: 2022-09-14

## 2022-10-11 ENCOUNTER — APPOINTMENT (OUTPATIENT)
Dept: SURGERY | Facility: CLINIC | Age: 62
End: 2022-10-11

## 2022-11-02 ENCOUNTER — OUTPATIENT (OUTPATIENT)
Dept: OUTPATIENT SERVICES | Facility: HOSPITAL | Age: 62
LOS: 1 days | End: 2022-11-02
Payer: COMMERCIAL

## 2022-11-02 VITALS
HEART RATE: 71 BPM | WEIGHT: 183.87 LBS | HEIGHT: 62 IN | OXYGEN SATURATION: 98 % | DIASTOLIC BLOOD PRESSURE: 86 MMHG | TEMPERATURE: 98 F | SYSTOLIC BLOOD PRESSURE: 144 MMHG

## 2022-11-02 DIAGNOSIS — Z90.13 ACQUIRED ABSENCE OF BILATERAL BREASTS AND NIPPLES: Chronic | ICD-10-CM

## 2022-11-02 DIAGNOSIS — N65.0 DEFORMITY OF RECONSTRUCTED BREAST: ICD-10-CM

## 2022-11-02 DIAGNOSIS — Z90.710 ACQUIRED ABSENCE OF BOTH CERVIX AND UTERUS: Chronic | ICD-10-CM

## 2022-11-02 DIAGNOSIS — T82.9XXA UNSPECIFIED COMPLICATION OF CARDIAC AND VASCULAR PROSTHETIC DEVICE, IMPLANT AND GRAFT, INITIAL ENCOUNTER: Chronic | ICD-10-CM

## 2022-11-02 DIAGNOSIS — Z98.890 OTHER SPECIFIED POSTPROCEDURAL STATES: Chronic | ICD-10-CM

## 2022-11-02 DIAGNOSIS — Z98.891 HISTORY OF UTERINE SCAR FROM PREVIOUS SURGERY: Chronic | ICD-10-CM

## 2022-11-02 DIAGNOSIS — Z95.828 PRESENCE OF OTHER VASCULAR IMPLANTS AND GRAFTS: Chronic | ICD-10-CM

## 2022-11-02 DIAGNOSIS — Z85.3 PERSONAL HISTORY OF MALIGNANT NEOPLASM OF BREAST: ICD-10-CM

## 2022-11-02 DIAGNOSIS — Z01.818 ENCOUNTER FOR OTHER PREPROCEDURAL EXAMINATION: ICD-10-CM

## 2022-11-02 DIAGNOSIS — Z90.13 ACQUIRED ABSENCE OF BILATERAL BREASTS AND NIPPLES: ICD-10-CM

## 2022-11-02 LAB
ANION GAP SERPL CALC-SCNC: 9 MMOL/L — SIGNIFICANT CHANGE UP (ref 5–17)
BUN SERPL-MCNC: 11 MG/DL — SIGNIFICANT CHANGE UP (ref 7–23)
CALCIUM SERPL-MCNC: 9.4 MG/DL — SIGNIFICANT CHANGE UP (ref 8.4–10.5)
CHLORIDE SERPL-SCNC: 104 MMOL/L — SIGNIFICANT CHANGE UP (ref 96–108)
CO2 SERPL-SCNC: 31 MMOL/L — SIGNIFICANT CHANGE UP (ref 22–31)
CREAT SERPL-MCNC: 0.75 MG/DL — SIGNIFICANT CHANGE UP (ref 0.5–1.3)
EGFR: 91 ML/MIN/1.73M2 — SIGNIFICANT CHANGE UP
GLUCOSE SERPL-MCNC: 88 MG/DL — SIGNIFICANT CHANGE UP (ref 70–99)
HCT VFR BLD CALC: 42.1 % — SIGNIFICANT CHANGE UP (ref 34.5–45)
HGB BLD-MCNC: 14.5 G/DL — SIGNIFICANT CHANGE UP (ref 11.5–15.5)
MCHC RBC-ENTMCNC: 30.1 PG — SIGNIFICANT CHANGE UP (ref 27–34)
MCHC RBC-ENTMCNC: 34.4 GM/DL — SIGNIFICANT CHANGE UP (ref 32–36)
MCV RBC AUTO: 87.3 FL — SIGNIFICANT CHANGE UP (ref 80–100)
NRBC # BLD: 0 /100 WBCS — SIGNIFICANT CHANGE UP (ref 0–0)
PLATELET # BLD AUTO: 134 K/UL — LOW (ref 150–400)
POTASSIUM SERPL-MCNC: 3.2 MMOL/L — LOW (ref 3.5–5.3)
POTASSIUM SERPL-SCNC: 3.2 MMOL/L — LOW (ref 3.5–5.3)
RBC # BLD: 4.82 M/UL — SIGNIFICANT CHANGE UP (ref 3.8–5.2)
RBC # FLD: 13 % — SIGNIFICANT CHANGE UP (ref 10.3–14.5)
SODIUM SERPL-SCNC: 144 MMOL/L — SIGNIFICANT CHANGE UP (ref 135–145)
WBC # BLD: 4.81 K/UL — SIGNIFICANT CHANGE UP (ref 3.8–10.5)
WBC # FLD AUTO: 4.81 K/UL — SIGNIFICANT CHANGE UP (ref 3.8–10.5)

## 2022-11-02 PROCEDURE — G0463: CPT

## 2022-11-02 PROCEDURE — 85027 COMPLETE CBC AUTOMATED: CPT

## 2022-11-02 PROCEDURE — 93010 ELECTROCARDIOGRAM REPORT: CPT | Mod: NC

## 2022-11-02 PROCEDURE — 93005 ELECTROCARDIOGRAM TRACING: CPT

## 2022-11-02 PROCEDURE — 36415 COLL VENOUS BLD VENIPUNCTURE: CPT

## 2022-11-02 PROCEDURE — 80048 BASIC METABOLIC PNL TOTAL CA: CPT

## 2022-11-02 RX ORDER — CHOLECALCIFEROL (VITAMIN D3) 125 MCG
0 CAPSULE ORAL
Qty: 0 | Refills: 0 | DISCHARGE

## 2022-11-02 NOTE — H&P PST ADULT - LAB RESULTS AND INTERPRETATION
pending
I personally performed the service described in the documentation recorded by the scribe in my presence, and it accurately and completely records my words and actions.

## 2022-11-02 NOTE — H&P PST ADULT - ASSESSMENT
62 year old female with hx of breast cancer s/ Bilateral Mastectomies/BUNNY flap reconstruction , s/p re excision lumpectomy right breast 2020 now presents for grafting right breast with autologous fat for Deformity of right breast .

## 2022-11-02 NOTE — H&P PST ADULT - HISTORY OF PRESENT ILLNESS
62 year old female with hx of breast ca s/p bilateral mastectomies/BUNNY flap reconstruction in 2018 followed by re excision of mass 11/2020  now presents for autologous fat grafting reconstruction on right breast  for deformity.

## 2022-11-14 ENCOUNTER — TRANSCRIPTION ENCOUNTER (OUTPATIENT)
Age: 62
End: 2022-11-14

## 2022-11-15 ENCOUNTER — TRANSCRIPTION ENCOUNTER (OUTPATIENT)
Age: 62
End: 2022-11-15

## 2022-11-15 ENCOUNTER — OUTPATIENT (OUTPATIENT)
Dept: OUTPATIENT SERVICES | Facility: HOSPITAL | Age: 62
LOS: 1 days | End: 2022-11-15
Payer: COMMERCIAL

## 2022-11-15 VITALS
SYSTOLIC BLOOD PRESSURE: 132 MMHG | DIASTOLIC BLOOD PRESSURE: 72 MMHG | OXYGEN SATURATION: 99 % | HEART RATE: 61 BPM | TEMPERATURE: 98 F | RESPIRATION RATE: 18 BRPM

## 2022-11-15 VITALS
SYSTOLIC BLOOD PRESSURE: 155 MMHG | HEART RATE: 79 BPM | HEIGHT: 62 IN | OXYGEN SATURATION: 100 % | WEIGHT: 183.87 LBS | DIASTOLIC BLOOD PRESSURE: 96 MMHG | TEMPERATURE: 98 F | RESPIRATION RATE: 14 BRPM

## 2022-11-15 DIAGNOSIS — Z98.890 OTHER SPECIFIED POSTPROCEDURAL STATES: Chronic | ICD-10-CM

## 2022-11-15 DIAGNOSIS — Z90.710 ACQUIRED ABSENCE OF BOTH CERVIX AND UTERUS: Chronic | ICD-10-CM

## 2022-11-15 DIAGNOSIS — Z90.13 ACQUIRED ABSENCE OF BILATERAL BREASTS AND NIPPLES: ICD-10-CM

## 2022-11-15 DIAGNOSIS — T82.9XXA UNSPECIFIED COMPLICATION OF CARDIAC AND VASCULAR PROSTHETIC DEVICE, IMPLANT AND GRAFT, INITIAL ENCOUNTER: Chronic | ICD-10-CM

## 2022-11-15 DIAGNOSIS — Z85.3 PERSONAL HISTORY OF MALIGNANT NEOPLASM OF BREAST: ICD-10-CM

## 2022-11-15 DIAGNOSIS — Z95.828 PRESENCE OF OTHER VASCULAR IMPLANTS AND GRAFTS: Chronic | ICD-10-CM

## 2022-11-15 DIAGNOSIS — N65.0 DEFORMITY OF RECONSTRUCTED BREAST: ICD-10-CM

## 2022-11-15 DIAGNOSIS — Z98.891 HISTORY OF UTERINE SCAR FROM PREVIOUS SURGERY: Chronic | ICD-10-CM

## 2022-11-15 DIAGNOSIS — Z90.13 ACQUIRED ABSENCE OF BILATERAL BREASTS AND NIPPLES: Chronic | ICD-10-CM

## 2022-11-15 LAB
PLATELET # BLD AUTO: 115 K/UL — LOW (ref 150–400)
POTASSIUM SERPL-MCNC: 3.6 MMOL/L — SIGNIFICANT CHANGE UP (ref 3.5–5.3)
POTASSIUM SERPL-SCNC: 3.6 MMOL/L — SIGNIFICANT CHANGE UP (ref 3.5–5.3)

## 2022-11-15 PROCEDURE — 85049 AUTOMATED PLATELET COUNT: CPT

## 2022-11-15 PROCEDURE — 84132 ASSAY OF SERUM POTASSIUM: CPT

## 2022-11-15 PROCEDURE — C9399: CPT

## 2022-11-15 PROCEDURE — 15772 GRFG AUTOL FAT LIPO EA ADDL: CPT

## 2022-11-15 PROCEDURE — 36415 COLL VENOUS BLD VENIPUNCTURE: CPT

## 2022-11-15 PROCEDURE — 15771 GRFG AUTOL FAT LIPO 50 CC/<: CPT

## 2022-11-15 RX ORDER — HYDROMORPHONE HYDROCHLORIDE 2 MG/ML
1 INJECTION INTRAMUSCULAR; INTRAVENOUS; SUBCUTANEOUS ONCE
Refills: 0 | Status: DISCONTINUED | OUTPATIENT
Start: 2022-11-15 | End: 2022-11-15

## 2022-11-15 RX ORDER — SODIUM CHLORIDE 9 MG/ML
1000 INJECTION, SOLUTION INTRAVENOUS
Refills: 0 | Status: DISCONTINUED | OUTPATIENT
Start: 2022-11-15 | End: 2022-11-15

## 2022-11-15 RX ORDER — HYDROMORPHONE HYDROCHLORIDE 2 MG/ML
0.5 INJECTION INTRAMUSCULAR; INTRAVENOUS; SUBCUTANEOUS ONCE
Refills: 0 | Status: DISCONTINUED | OUTPATIENT
Start: 2022-11-15 | End: 2022-11-15

## 2022-11-15 RX ORDER — ONDANSETRON 8 MG/1
4 TABLET, FILM COATED ORAL ONCE
Refills: 0 | Status: DISCONTINUED | OUTPATIENT
Start: 2022-11-15 | End: 2022-11-15

## 2022-11-15 RX ORDER — TRASTUZUMAB-DKST 420 MG/20ML
0 INJECTION, POWDER, LYOPHILIZED, FOR SOLUTION INTRAVENOUS
Qty: 0 | Refills: 0 | DISCHARGE

## 2022-11-15 RX ADMIN — SODIUM CHLORIDE 50 MILLILITER(S): 9 INJECTION, SOLUTION INTRAVENOUS at 06:48

## 2022-11-15 NOTE — ASU PATIENT PROFILE, ADULT - NSICDXPASTSURGICALHX_GEN_ALL_CORE_FT
PAST SURGICAL HISTORY:  H/O bilateral mastectomy With BUNNY flap 2018    History of  section 1994    History of hysterectomy 2013    History of oral surgery     Port-a-cath in place chemoport left chest wall 2017    S/P lumpectomy, right breast     Status post endometrial ablation 2013    Vascular port complication, initial encounter Removed

## 2022-11-15 NOTE — ASU DISCHARGE PLAN (ADULT/PEDIATRIC) - COMMENTS
Please call and schedule a follow up appointment in 1 week if you do not already have one scheduled.

## 2022-11-15 NOTE — BRIEF OPERATIVE NOTE - NSICDXBRIEFPREOP_GEN_ALL_CORE_FT
PRE-OP DIAGNOSIS:  History of breast cancer 15-Nov-2022 09:35:19  Heather Liang  S/P bilateral mastectomy 15-Nov-2022 09:35:25  Heather Liang  Deformity of reconstructed breast 15-Nov-2022 09:35:31  Heather Liang

## 2022-11-15 NOTE — ASU DISCHARGE PLAN (ADULT/PEDIATRIC) - CARE PROVIDER_API CALL
Neymar Burleson)  Plastic Surgery  [address], [address]  [place] [address]  Phone: [phone]  Fax: [phone]  Established Patient  Follow Up Time: 1 week

## 2022-11-15 NOTE — ASU PATIENT PROFILE, ADULT - FALL HARM RISK - UNIVERSAL INTERVENTIONS
Bed in lowest position, wheels locked, appropriate side rails in place/Call bell, personal items and telephone in reach/Instruct patient to call for assistance before getting out of bed or chair/Non-slip footwear when patient is out of bed/[place] to call system/Physically safe environment - no spills, clutter or unnecessary equipment/Purposeful Proactive Rounding/Room/bathroom lighting operational, light cord in reach

## 2022-11-15 NOTE — BRIEF OPERATIVE NOTE - NSICDXBRIEFPOSTOP_GEN_ALL_CORE_FT
POST-OP DIAGNOSIS:  History of breast cancer 15-Nov-2022 09:35:43  Heather Liang  S/P bilateral mastectomy 15-Nov-2022 09:35:48  Heather Liang  Deformity of reconstructed breast 15-Nov-2022 09:35:52  Heather Liang

## 2022-11-15 NOTE — ASU DISCHARGE PLAN (ADULT/PEDIATRIC) - ASU DC SPECIAL INSTRUCTIONSFT
You may take your bra and binder off in 2 days and shower.  Allow soap and water to run over your incisions but do not scrub.  Pat dry and replace binder and loosely reapply bra.  No soaking, swimming or bathing for two weeks.  No heavy compression, ice or heat to right breast.  Please be aware of signs and symptoms of wound infection such as redness and warmth around your incision sites, purulent drainage or fevers >100.4 degrees F.  If you are taking any narcotic pain medication please do not drink alcohol, drive or operate heavy machinery.    Call your surgeon with any questions or concerns.

## 2022-11-15 NOTE — ASU PREOP CHECKLIST - PATIENT SENT TO
Anesthesia Type: 2% lidocaine with epinephrine and a 1:10 solution of 8.4% sodium bicarbonate holding area

## 2022-12-13 ENCOUNTER — OUTPATIENT (OUTPATIENT)
Dept: OUTPATIENT SERVICES | Facility: HOSPITAL | Age: 62
LOS: 1 days | Discharge: HOME | End: 2022-12-13

## 2022-12-13 DIAGNOSIS — Z90.13 ACQUIRED ABSENCE OF BILATERAL BREASTS AND NIPPLES: Chronic | ICD-10-CM

## 2022-12-13 DIAGNOSIS — Z98.890 OTHER SPECIFIED POSTPROCEDURAL STATES: Chronic | ICD-10-CM

## 2022-12-13 DIAGNOSIS — T82.9XXA UNSPECIFIED COMPLICATION OF CARDIAC AND VASCULAR PROSTHETIC DEVICE, IMPLANT AND GRAFT, INITIAL ENCOUNTER: Chronic | ICD-10-CM

## 2022-12-13 DIAGNOSIS — Z90.710 ACQUIRED ABSENCE OF BOTH CERVIX AND UTERUS: Chronic | ICD-10-CM

## 2022-12-13 DIAGNOSIS — C50.919 MALIGNANT NEOPLASM OF UNSPECIFIED SITE OF UNSPECIFIED FEMALE BREAST: ICD-10-CM

## 2022-12-13 DIAGNOSIS — Z95.828 PRESENCE OF OTHER VASCULAR IMPLANTS AND GRAFTS: Chronic | ICD-10-CM

## 2022-12-13 DIAGNOSIS — Z98.891 HISTORY OF UTERINE SCAR FROM PREVIOUS SURGERY: Chronic | ICD-10-CM

## 2022-12-17 ENCOUNTER — NON-APPOINTMENT (OUTPATIENT)
Age: 62
End: 2022-12-17

## 2022-12-22 ENCOUNTER — RESULT REVIEW (OUTPATIENT)
Age: 62
End: 2022-12-22

## 2022-12-22 ENCOUNTER — APPOINTMENT (OUTPATIENT)
Dept: HEMATOLOGY ONCOLOGY | Facility: CLINIC | Age: 62
End: 2022-12-22

## 2022-12-22 VITALS
TEMPERATURE: 97.9 F | BODY MASS INDEX: 34.93 KG/M2 | WEIGHT: 185 LBS | DIASTOLIC BLOOD PRESSURE: 93 MMHG | SYSTOLIC BLOOD PRESSURE: 157 MMHG | HEIGHT: 61.2 IN | OXYGEN SATURATION: 98 % | HEART RATE: 80 BPM

## 2022-12-22 DIAGNOSIS — Z85.3 ENCOUNTER FOR FOLLOW-UP EXAMINATION AFTER COMPLETED TREATMENT FOR MALIGNANT NEOPLASM: ICD-10-CM

## 2022-12-22 DIAGNOSIS — Z08 ENCOUNTER FOR FOLLOW-UP EXAMINATION AFTER COMPLETED TREATMENT FOR MALIGNANT NEOPLASM: ICD-10-CM

## 2022-12-22 LAB
BASOPHILS # BLD AUTO: 0.1 K/UL — SIGNIFICANT CHANGE UP (ref 0–0.2)
BASOPHILS NFR BLD AUTO: 1.7 % — SIGNIFICANT CHANGE UP (ref 0–2)
EOSINOPHIL # BLD AUTO: 0.2 K/UL — SIGNIFICANT CHANGE UP (ref 0–0.5)
EOSINOPHIL NFR BLD AUTO: 4.9 % — SIGNIFICANT CHANGE UP (ref 0–6)
HCT VFR BLD CALC: 41.9 % — SIGNIFICANT CHANGE UP (ref 34.5–45)
HGB BLD-MCNC: 14.5 G/DL — SIGNIFICANT CHANGE UP (ref 11.5–15.5)
LYMPHOCYTES # BLD AUTO: 0.7 K/UL — LOW (ref 1–3.3)
LYMPHOCYTES # BLD AUTO: 18.7 % — SIGNIFICANT CHANGE UP (ref 13–44)
MCHC RBC-ENTMCNC: 31.1 PG — SIGNIFICANT CHANGE UP (ref 27–34)
MCHC RBC-ENTMCNC: 34.6 G/DL — SIGNIFICANT CHANGE UP (ref 32–36)
MCV RBC AUTO: 89.9 FL — SIGNIFICANT CHANGE UP (ref 80–100)
MONOCYTES # BLD AUTO: 0.4 K/UL — SIGNIFICANT CHANGE UP (ref 0–0.9)
MONOCYTES NFR BLD AUTO: 10.4 % — SIGNIFICANT CHANGE UP (ref 2–14)
NEUTROPHILS # BLD AUTO: 2.4 K/UL — SIGNIFICANT CHANGE UP (ref 1.8–7.4)
NEUTROPHILS NFR BLD AUTO: 64.4 % — SIGNIFICANT CHANGE UP (ref 43–77)
PLATELET # BLD AUTO: 119 K/UL — LOW (ref 150–400)
RBC # BLD: 4.66 M/UL — SIGNIFICANT CHANGE UP (ref 3.8–5.2)
RBC # FLD: 11.2 % — SIGNIFICANT CHANGE UP (ref 10.3–14.5)
WBC # BLD: 3.7 K/UL — LOW (ref 3.8–10.5)
WBC # FLD AUTO: 3.7 K/UL — LOW (ref 3.8–10.5)

## 2022-12-22 PROCEDURE — 99214 OFFICE O/P EST MOD 30 MIN: CPT

## 2022-12-22 NOTE — HISTORY OF PRESENT ILLNESS
[de-identified] : Ms. Young was diagnosed with HER2 positive right breast cancer at age 56 in August 2017.\par \par She initially presented with right nipple yellowish discharge. Subsequent mammogram and ultrasound on 7/27/17 showed new spiculated mass in inner central posterior right breast with sonographic confirmation of suspicious mass at 3-4:00 region, and an indeterminate hypoechoic nodule at 1:00 right breast.\par \par On 8/3/17 she had right breast core biopsy of 3-4:00 mass --> pathology showed invasive poorly differentiated ductal carcinoma, Enzo grade 8/9, 0.9 cm--> ER-, MI-, HER2 IHC 3+ (positive)\par Right breast 1:00 --> fibrocystic change, proliferative type with nodular sclerosing adenosis\par \par 8/21/17 MRI breast - Biopsy clip and 3.8 cm area of irregular non mass enhancement in the right breast at the 3-4:00 location posteriorly, corresponding to recently\par diagnosed invasive carcinoma. Additional irregular areas of enhancement in the right breast at the 2-3:00 location and 8-9:00 location, suspicious for additional disease. Representative MR guided core biopsy of enhancement at the 8-9:00 location can be performed to evaluate for multicentric disease, if it would change\par management. 8 mm enhancing nodule demonstrating washout kinetics in the upper slightly inner mid left breast. MR guided core biopsy is advised.\par Clip artifact in the right breast at the 1:00 location marking site of biopsy-proven radial scar. No associated suspicious enhancement. Recommend\par appropriate surgical management. 2.1 cm oval T2 bright lesion in the liver, probable cyst or hemangioma.\par Recommend dedicated liver ultrasound for further evaluation.\par \par 8/28/17 MRI Core biopsy right breast lower outer quadrant --> DCIS, solid pattern, high grade atypia, focal necrosis. ER/MI pending\par Core biopsy left breast central inner --> mild proliferative fibrocystic change\par \par Treatment Summary\par 9/21/17 port placement\par 9/20/17 - 1/3/18 TCHP x 6 cycles (9/30/17 - 1/03/18\par 5/03/18 - 11/26/18 Trastuzumab (Herceptin) X every 3 weeks, to complete 52 weeks.\par She had breast reconstruction on 8/14/18 with nipple reconstruction.\par S/p port removal on 6/19/19.\par \par 3/14/18 - She underwent right skin sparing mastectomy with SNL biopsy and left risk reduction mastectomy with BUNNY reconstruction.\par 1- Left breast, mastectomy- Fibrocystic changes\par 2- Right sentinel lymph node, excision- Three negative lymph nodes (0/3)\par 3- Right sentinel lymph node 2, excision- One negative lymph node (0/1)\par 4- Right breast, mastectomy- No residual invasive carcinoma (s/p neoadjuvant treatment)- No residual DCIS (s/p neoadjuvant treatment)\par  Pathologic staging: ypT0, ypN0\par \par . \par \par Disease: right breast cancer \par Pathology: HER2 positive \par TNM stage: T2 \par \par \par Interval History: Patient returns for follow up. \par She had a R breast US on 10/20/20 for palpable R breast lump which showed 3.5 cm mass at 4:00, 9 cmfn. \par 10/26/20 US guided biopsy R breast 4:00 --Breast, right, 4 o'clock, 9 cm FN, ultrasound guided core biopsy\par - Invasive poorly differentiated ductal carcinoma withfocal necrosis and foci with micropapillary features\par - Enzo score 8/9 (3 + 3+ 2 )\par - Invasive tumor measures at least 1.5 cm\par -ER 0%, MI 0%, HER2 3+\par \par On 11/6/20 she underwent right mastectomy flap excision - IDC, poorly differentiated with necrosis, involvement of dermal skin and retraction artifact, 3.8 x 3.1 x 3.1\par -ER 0%, MI 0%, HER2 3+.\par \par Patient completed breast radiation 2/25/21\par Kadcyla last treatment 6/14/21\par \par S/p right breast fat grafting with plastic surgeon Dr. Burleson in 6/2022  [de-identified] : Returns for follow up.\par Reports feels well overall \par Continues on PO iron, notices decrease in hair loss. \par Denies new symptoms\par

## 2022-12-22 NOTE — ASSESSMENT
[FreeTextEntry1] : 62 year old postmenopausal female with stage IIA (T2) ER-/AZ-/HER2 positive right breast cancer. S/p 6 cycles of neoadjuvant TCHP followed by bilateral mastectomy + BUNNY on 3/14/18, ypT0 ypN0 (pathologic complete response). Completed 1 year of Herceptin on 11/26/18. \par She developed R mastectomy flap recurrence in 10/2020, s/p excision on 11/6/20 -> 3.8 cm poorly differentiated IDC, ER-/AZ-/HER2 positive. Completed breast RT 2/25/21.\par She received adjuvant Kadcyla 1/11/21, dose reduced to 3 mg/kg in 7/2021 for elevated LFTs. Last cycle on 12/7/21. Discussed that there is no clear role for adjuvant neratinib in this setting as benefit was primarily seen in ER+/HER2 positive patients, and that too if given within 1 year of herceptin. Role of neratinib for ER-/HER2 positive disease and post Kadcyla is unclear. \par 02/05/2022 CT Chest -Interval resolution of right pleural and fissural thickening compared to 1/5/2022.\par 1/24/2022 Echo- LV EF 63% \par \par Reviewed: \par 9/9/22 AST 45, ALT 40, ALK Phos 109\par 9/10/22 CT AP IMPRESSION: No evidence of metastatic disease in the abdomen/pelvis. No hepatobiliary abnormality.\par 12/22/2022 Today's CBC: WBC 3.7 K, HGB 14.5 g, HCT 41.9 %,  K, ANC 2400\par \par Plan: \par -Thrombocytopenia- slight trending upward, today PLT 119K, stable. \par -will be seeing Dr. Gutierrez for follow up next week\par -Follow up in 4 months

## 2022-12-22 NOTE — PHYSICAL EXAM
[Normal] : affect appropriate [de-identified] : supple [de-identified] : clear [de-identified] : S1S2 [de-identified] : Post surgical/RT changes in reconstructed R breast. No palpable mass or axillary adenopathy.

## 2022-12-22 NOTE — ADDENDUM
[FreeTextEntry1] : Documented by Vannessa Mccarthy acting as scribe for Dr. Reddy on 12/22/2022.\par \par All Medical record entries made by the Scribe were at my, Dr. Reddy, direction and personally dictated by me on 12/22/2022. I have reviewed the chart and agree that the record accurately reflects my personal performance of the history, physical exam, assessment and plan. I have also personally directed, reviewed, and agreed with the discharge instructions.

## 2022-12-23 LAB
ALBUMIN SERPL ELPH-MCNC: 4.6 G/DL
ALP BLD-CCNC: 121 U/L
ALT SERPL-CCNC: 44 U/L
ANION GAP SERPL CALC-SCNC: 13 MMOL/L
AST SERPL-CCNC: 43 U/L
BILIRUB SERPL-MCNC: 0.8 MG/DL
BUN SERPL-MCNC: 17 MG/DL
CALCIUM SERPL-MCNC: 9.7 MG/DL
CHLORIDE SERPL-SCNC: 100 MMOL/L
CO2 SERPL-SCNC: 29 MMOL/L
CREAT SERPL-MCNC: 0.83 MG/DL
EGFR: 81 ML/MIN/1.73M2
FERRITIN SERPL-MCNC: 107 NG/ML
FOLATE SERPL-MCNC: >20 NG/ML
GLUCOSE SERPL-MCNC: 92 MG/DL
IRON SATN MFR SERPL: 24 %
IRON SERPL-MCNC: 101 UG/DL
POTASSIUM SERPL-SCNC: 3.6 MMOL/L
PROT SERPL-MCNC: 7 G/DL
SODIUM SERPL-SCNC: 142 MMOL/L
TIBC SERPL-MCNC: 413 UG/DL
UIBC SERPL-MCNC: 313 UG/DL
VIT B12 SERPL-MCNC: 765 PG/ML

## 2023-01-10 ENCOUNTER — RESULT REVIEW (OUTPATIENT)
Age: 63
End: 2023-01-10

## 2023-01-10 ENCOUNTER — APPOINTMENT (OUTPATIENT)
Dept: RADIOLOGY | Facility: CLINIC | Age: 63
End: 2023-01-10
Payer: COMMERCIAL

## 2023-01-10 PROCEDURE — 77080 DXA BONE DENSITY AXIAL: CPT

## 2023-02-24 NOTE — H&P PST ADULT - SKIN/BREAST COMMENTS
Right breast cancer [facility] Counseling:  I discussed with the patient the risks of golimumab including but not limited to myelosuppression, immunosuppression, autoimmune hepatitis, demyelinating diseases, lymphoma, and serious infections.  The patient understands that monitoring is required including a PPD at baseline and must alert us or the primary physician if symptoms of infection or other concerning signs are noted.

## 2023-05-19 ENCOUNTER — OUTPATIENT (OUTPATIENT)
Dept: OUTPATIENT SERVICES | Facility: HOSPITAL | Age: 63
LOS: 1 days | Discharge: HOME | End: 2023-05-19

## 2023-05-19 DIAGNOSIS — Z98.891 HISTORY OF UTERINE SCAR FROM PREVIOUS SURGERY: Chronic | ICD-10-CM

## 2023-05-19 DIAGNOSIS — Z95.828 PRESENCE OF OTHER VASCULAR IMPLANTS AND GRAFTS: Chronic | ICD-10-CM

## 2023-05-19 DIAGNOSIS — Z90.13 ACQUIRED ABSENCE OF BILATERAL BREASTS AND NIPPLES: Chronic | ICD-10-CM

## 2023-05-19 DIAGNOSIS — Z90.710 ACQUIRED ABSENCE OF BOTH CERVIX AND UTERUS: Chronic | ICD-10-CM

## 2023-05-19 DIAGNOSIS — C50.919 MALIGNANT NEOPLASM OF UNSPECIFIED SITE OF UNSPECIFIED FEMALE BREAST: ICD-10-CM

## 2023-05-19 DIAGNOSIS — T82.9XXA UNSPECIFIED COMPLICATION OF CARDIAC AND VASCULAR PROSTHETIC DEVICE, IMPLANT AND GRAFT, INITIAL ENCOUNTER: Chronic | ICD-10-CM

## 2023-05-19 DIAGNOSIS — Z98.890 OTHER SPECIFIED POSTPROCEDURAL STATES: Chronic | ICD-10-CM

## 2023-05-23 ENCOUNTER — OUTPATIENT (OUTPATIENT)
Dept: OUTPATIENT SERVICES | Facility: HOSPITAL | Age: 63
LOS: 1 days | End: 2023-05-23
Payer: COMMERCIAL

## 2023-05-23 VITALS
WEIGHT: 197.98 LBS | SYSTOLIC BLOOD PRESSURE: 138 MMHG | TEMPERATURE: 98 F | HEIGHT: 62 IN | HEART RATE: 76 BPM | RESPIRATION RATE: 16 BRPM | DIASTOLIC BLOOD PRESSURE: 68 MMHG | OXYGEN SATURATION: 98 %

## 2023-05-23 DIAGNOSIS — Z98.891 HISTORY OF UTERINE SCAR FROM PREVIOUS SURGERY: Chronic | ICD-10-CM

## 2023-05-23 DIAGNOSIS — T82.9XXA UNSPECIFIED COMPLICATION OF CARDIAC AND VASCULAR PROSTHETIC DEVICE, IMPLANT AND GRAFT, INITIAL ENCOUNTER: Chronic | ICD-10-CM

## 2023-05-23 DIAGNOSIS — I10 ESSENTIAL (PRIMARY) HYPERTENSION: ICD-10-CM

## 2023-05-23 DIAGNOSIS — Z01.818 ENCOUNTER FOR OTHER PREPROCEDURAL EXAMINATION: ICD-10-CM

## 2023-05-23 DIAGNOSIS — Z90.13 ACQUIRED ABSENCE OF BILATERAL BREASTS AND NIPPLES: ICD-10-CM

## 2023-05-23 DIAGNOSIS — Z98.890 OTHER SPECIFIED POSTPROCEDURAL STATES: Chronic | ICD-10-CM

## 2023-05-23 DIAGNOSIS — Z95.828 PRESENCE OF OTHER VASCULAR IMPLANTS AND GRAFTS: Chronic | ICD-10-CM

## 2023-05-23 DIAGNOSIS — Z90.13 ACQUIRED ABSENCE OF BILATERAL BREASTS AND NIPPLES: Chronic | ICD-10-CM

## 2023-05-23 DIAGNOSIS — N65.0 DEFORMITY OF RECONSTRUCTED BREAST: ICD-10-CM

## 2023-05-23 DIAGNOSIS — Z90.710 ACQUIRED ABSENCE OF BOTH CERVIX AND UTERUS: Chronic | ICD-10-CM

## 2023-05-23 DIAGNOSIS — Z85.3 PERSONAL HISTORY OF MALIGNANT NEOPLASM OF BREAST: ICD-10-CM

## 2023-05-23 LAB
ANION GAP SERPL CALC-SCNC: 8 MMOL/L — SIGNIFICANT CHANGE UP (ref 5–17)
BUN SERPL-MCNC: 14 MG/DL — SIGNIFICANT CHANGE UP (ref 7–23)
CALCIUM SERPL-MCNC: 9.9 MG/DL — SIGNIFICANT CHANGE UP (ref 8.4–10.5)
CHLORIDE SERPL-SCNC: 103 MMOL/L — SIGNIFICANT CHANGE UP (ref 96–108)
CO2 SERPL-SCNC: 31 MMOL/L — SIGNIFICANT CHANGE UP (ref 22–31)
CREAT SERPL-MCNC: 0.64 MG/DL — SIGNIFICANT CHANGE UP (ref 0.5–1.3)
EGFR: 101 ML/MIN/1.73M2 — SIGNIFICANT CHANGE UP
GLUCOSE SERPL-MCNC: 91 MG/DL — SIGNIFICANT CHANGE UP (ref 70–99)
HCT VFR BLD CALC: 42.1 % — SIGNIFICANT CHANGE UP (ref 34.5–45)
HGB BLD-MCNC: 15.2 G/DL — SIGNIFICANT CHANGE UP (ref 11.5–15.5)
MCHC RBC-ENTMCNC: 30.8 PG — SIGNIFICANT CHANGE UP (ref 27–34)
MCHC RBC-ENTMCNC: 36.1 GM/DL — HIGH (ref 32–36)
MCV RBC AUTO: 85.2 FL — SIGNIFICANT CHANGE UP (ref 80–100)
NRBC # BLD: 0 /100 WBCS — SIGNIFICANT CHANGE UP (ref 0–0)
PLATELET # BLD AUTO: 146 K/UL — LOW (ref 150–400)
POTASSIUM SERPL-MCNC: 3 MMOL/L — LOW (ref 3.5–5.3)
POTASSIUM SERPL-SCNC: 3 MMOL/L — LOW (ref 3.5–5.3)
RBC # BLD: 4.94 M/UL — SIGNIFICANT CHANGE UP (ref 3.8–5.2)
RBC # FLD: 12.5 % — SIGNIFICANT CHANGE UP (ref 10.3–14.5)
SODIUM SERPL-SCNC: 142 MMOL/L — SIGNIFICANT CHANGE UP (ref 135–145)
WBC # BLD: 5.04 K/UL — SIGNIFICANT CHANGE UP (ref 3.8–10.5)
WBC # FLD AUTO: 5.04 K/UL — SIGNIFICANT CHANGE UP (ref 3.8–10.5)

## 2023-05-23 PROCEDURE — 85027 COMPLETE CBC AUTOMATED: CPT

## 2023-05-23 PROCEDURE — G0463: CPT

## 2023-05-23 PROCEDURE — 36415 COLL VENOUS BLD VENIPUNCTURE: CPT

## 2023-05-23 PROCEDURE — 93005 ELECTROCARDIOGRAM TRACING: CPT

## 2023-05-23 PROCEDURE — 93010 ELECTROCARDIOGRAM REPORT: CPT | Mod: NC

## 2023-05-23 PROCEDURE — 80048 BASIC METABOLIC PNL TOTAL CA: CPT

## 2023-05-23 NOTE — H&P PST ADULT - HISTORY OF PRESENT ILLNESS
62 year old female with hx of breast ca, h/o Radiation, and chemo.  s/p bilateral mastectomies/BUNNY flap reconstruction in 2018 followed by re excision of mass 11/2020, s/p autologous fat grafting reconstruction on right breast  for deformity in 11/2022.  Now presents for scheduled right breast revision, left breast reduction/revision.

## 2023-05-23 NOTE — H&P PST ADULT - PROBLEM SELECTOR PLAN 1
Patient provided with pre-operative instructions and verbalized understanding.  Patient  will be NPO on day of surgery. Patient will stop NSAIDs, aspirin, herbal supplements or vitamins 1 week prior to surgery.  Chlorhexidine wash provided with instructions.Pending medical clearance as per surgeon.

## 2023-05-27 LAB — POTASSIUM SERPL-SCNC: 3.4 MMOL/L

## 2023-05-31 ENCOUNTER — TRANSCRIPTION ENCOUNTER (OUTPATIENT)
Age: 63
End: 2023-05-31

## 2023-06-01 ENCOUNTER — TRANSCRIPTION ENCOUNTER (OUTPATIENT)
Age: 63
End: 2023-06-01

## 2023-06-01 ENCOUNTER — OUTPATIENT (OUTPATIENT)
Dept: OUTPATIENT SERVICES | Facility: HOSPITAL | Age: 63
LOS: 1 days | End: 2023-06-01
Payer: COMMERCIAL

## 2023-06-01 VITALS
OXYGEN SATURATION: 94 % | HEART RATE: 79 BPM | SYSTOLIC BLOOD PRESSURE: 131 MMHG | DIASTOLIC BLOOD PRESSURE: 75 MMHG | RESPIRATION RATE: 16 BRPM

## 2023-06-01 VITALS
HEIGHT: 62 IN | RESPIRATION RATE: 13 BRPM | OXYGEN SATURATION: 98 % | SYSTOLIC BLOOD PRESSURE: 148 MMHG | DIASTOLIC BLOOD PRESSURE: 87 MMHG | HEART RATE: 79 BPM | TEMPERATURE: 97 F | WEIGHT: 197.98 LBS

## 2023-06-01 DIAGNOSIS — N65.0 DEFORMITY OF RECONSTRUCTED BREAST: ICD-10-CM

## 2023-06-01 DIAGNOSIS — Z98.891 HISTORY OF UTERINE SCAR FROM PREVIOUS SURGERY: Chronic | ICD-10-CM

## 2023-06-01 DIAGNOSIS — Z95.828 PRESENCE OF OTHER VASCULAR IMPLANTS AND GRAFTS: Chronic | ICD-10-CM

## 2023-06-01 DIAGNOSIS — Z98.890 OTHER SPECIFIED POSTPROCEDURAL STATES: Chronic | ICD-10-CM

## 2023-06-01 DIAGNOSIS — Z90.13 ACQUIRED ABSENCE OF BILATERAL BREASTS AND NIPPLES: ICD-10-CM

## 2023-06-01 DIAGNOSIS — Z90.710 ACQUIRED ABSENCE OF BOTH CERVIX AND UTERUS: Chronic | ICD-10-CM

## 2023-06-01 DIAGNOSIS — Z90.13 ACQUIRED ABSENCE OF BILATERAL BREASTS AND NIPPLES: Chronic | ICD-10-CM

## 2023-06-01 DIAGNOSIS — Z85.3 PERSONAL HISTORY OF MALIGNANT NEOPLASM OF BREAST: ICD-10-CM

## 2023-06-01 DIAGNOSIS — T82.9XXA UNSPECIFIED COMPLICATION OF CARDIAC AND VASCULAR PROSTHETIC DEVICE, IMPLANT AND GRAFT, INITIAL ENCOUNTER: Chronic | ICD-10-CM

## 2023-06-01 LAB
POTASSIUM SERPL-MCNC: 3.7 MMOL/L — SIGNIFICANT CHANGE UP (ref 3.5–5.3)
POTASSIUM SERPL-SCNC: 3.7 MMOL/L — SIGNIFICANT CHANGE UP (ref 3.5–5.3)

## 2023-06-01 PROCEDURE — 19318 BREAST REDUCTION: CPT | Mod: LT

## 2023-06-01 PROCEDURE — 15771 GRFG AUTOL FAT LIPO 50 CC/<: CPT | Mod: XS

## 2023-06-01 PROCEDURE — 88304 TISSUE EXAM BY PATHOLOGIST: CPT

## 2023-06-01 PROCEDURE — 15772 GRFG AUTOL FAT LIPO EA ADDL: CPT

## 2023-06-01 PROCEDURE — 36415 COLL VENOUS BLD VENIPUNCTURE: CPT

## 2023-06-01 PROCEDURE — 84132 ASSAY OF SERUM POTASSIUM: CPT

## 2023-06-01 PROCEDURE — 88304 TISSUE EXAM BY PATHOLOGIST: CPT | Mod: 26

## 2023-06-01 RX ORDER — ERGOCALCIFEROL 1.25 MG/1
1 CAPSULE ORAL
Qty: 0 | Refills: 0 | DISCHARGE

## 2023-06-01 RX ORDER — SODIUM CHLORIDE 9 MG/ML
1000 INJECTION, SOLUTION INTRAVENOUS
Refills: 0 | Status: DISCONTINUED | OUTPATIENT
Start: 2023-06-01 | End: 2023-06-01

## 2023-06-01 RX ORDER — ONDANSETRON 8 MG/1
4 TABLET, FILM COATED ORAL ONCE
Refills: 0 | Status: DISCONTINUED | OUTPATIENT
Start: 2023-06-01 | End: 2023-06-01

## 2023-06-01 RX ORDER — LOSARTAN POTASSIUM 100 MG/1
1 TABLET, FILM COATED ORAL
Refills: 0 | DISCHARGE

## 2023-06-01 RX ORDER — HYDROMORPHONE HYDROCHLORIDE 2 MG/ML
0.5 INJECTION INTRAMUSCULAR; INTRAVENOUS; SUBCUTANEOUS
Refills: 0 | Status: DISCONTINUED | OUTPATIENT
Start: 2023-06-01 | End: 2023-06-01

## 2023-06-01 RX ORDER — FERROUS SULFATE 325(65) MG
1 TABLET ORAL
Qty: 0 | Refills: 0 | DISCHARGE

## 2023-06-01 RX ORDER — SODIUM CHLORIDE 9 MG/ML
1000 INJECTION, SOLUTION INTRAVENOUS
Refills: 0 | Status: DISCONTINUED | OUTPATIENT
Start: 2023-06-01 | End: 2023-06-15

## 2023-06-01 RX ORDER — LOSARTAN/HYDROCHLOROTHIAZIDE 100MG-25MG
1 TABLET ORAL
Qty: 0 | Refills: 0 | DISCHARGE

## 2023-06-01 RX ADMIN — SODIUM CHLORIDE 50 MILLILITER(S): 9 INJECTION, SOLUTION INTRAVENOUS at 11:26

## 2023-06-01 NOTE — ASU DISCHARGE PLAN (ADULT/PEDIATRIC) - CARE PROVIDER_API CALL
Neymar Burleson  Plastic Surgery  [address], [address]  [place] [address]  Phone: [phone]  Fax: [phone]  Follow Up Time: 1 week

## 2023-06-01 NOTE — ASU PREOP CHECKLIST - NS PREOP CHK CHLOROHEX WASH
RETURN TO WORK/SCHOOL FORM    5/11/2022    Re: Rachell Jama  2007      To Whom It May Concern:     Rachell Jama was seen in clinic today..  She may return to school without restrictions on 5/12/22          Restrictions:  None      hSamar Archer MD  5/11/2022 1:52 PM   
at home:

## 2023-06-01 NOTE — ASU DISCHARGE PLAN (ADULT/PEDIATRIC) - NS MD DC FALL RISK RISK
For information on Fall & Injury Prevention, visit: [email or web address] OR  [email or web address] OR  https://www.cdc.gov/steadi/patient.html

## 2023-06-06 LAB — SURGICAL PATHOLOGY STUDY: SIGNIFICANT CHANGE UP

## 2023-07-12 ENCOUNTER — RESULT REVIEW (OUTPATIENT)
Age: 63
End: 2023-07-12

## 2023-07-12 ENCOUNTER — LABORATORY RESULT (OUTPATIENT)
Age: 63
End: 2023-07-12

## 2023-07-12 ENCOUNTER — APPOINTMENT (OUTPATIENT)
Dept: HEMATOLOGY ONCOLOGY | Facility: CLINIC | Age: 63
End: 2023-07-12
Payer: COMMERCIAL

## 2023-07-12 VITALS
WEIGHT: 199.4 LBS | HEART RATE: 74 BPM | BODY MASS INDEX: 37.65 KG/M2 | HEIGHT: 61.2 IN | DIASTOLIC BLOOD PRESSURE: 87 MMHG | OXYGEN SATURATION: 99 % | SYSTOLIC BLOOD PRESSURE: 148 MMHG

## 2023-07-12 DIAGNOSIS — D64.9 ANEMIA, UNSPECIFIED: ICD-10-CM

## 2023-07-12 LAB
BASOPHILS # BLD AUTO: 0.1 K/UL — SIGNIFICANT CHANGE UP (ref 0–0.2)
BASOPHILS NFR BLD AUTO: 1.5 % — SIGNIFICANT CHANGE UP (ref 0–2)
EOSINOPHIL # BLD AUTO: 0.2 K/UL — SIGNIFICANT CHANGE UP (ref 0–0.5)
EOSINOPHIL NFR BLD AUTO: 5.6 % — SIGNIFICANT CHANGE UP (ref 0–6)
HCT VFR BLD CALC: 41.2 % — SIGNIFICANT CHANGE UP (ref 34.5–45)
HGB BLD-MCNC: 14.5 G/DL — SIGNIFICANT CHANGE UP (ref 11.5–15.5)
LYMPHOCYTES # BLD AUTO: 0.7 K/UL — LOW (ref 1–3.3)
LYMPHOCYTES # BLD AUTO: 15.3 % — SIGNIFICANT CHANGE UP (ref 13–44)
MCHC RBC-ENTMCNC: 30 PG — SIGNIFICANT CHANGE UP (ref 27–34)
MCHC RBC-ENTMCNC: 35.2 G/DL — SIGNIFICANT CHANGE UP (ref 32–36)
MCV RBC AUTO: 85.3 FL — SIGNIFICANT CHANGE UP (ref 80–100)
MONOCYTES # BLD AUTO: 0.4 K/UL — SIGNIFICANT CHANGE UP (ref 0–0.9)
MONOCYTES NFR BLD AUTO: 9.5 % — SIGNIFICANT CHANGE UP (ref 2–14)
NEUTROPHILS # BLD AUTO: 2.9 K/UL — SIGNIFICANT CHANGE UP (ref 1.8–7.4)
NEUTROPHILS NFR BLD AUTO: 68.2 % — SIGNIFICANT CHANGE UP (ref 43–77)
PLATELET # BLD AUTO: 125 K/UL — LOW (ref 150–400)
RBC # BLD: 4.84 M/UL — SIGNIFICANT CHANGE UP (ref 3.8–5.2)
RBC # FLD: 10.9 % — SIGNIFICANT CHANGE UP (ref 10.3–14.5)
WBC # BLD: 4.3 K/UL — SIGNIFICANT CHANGE UP (ref 3.8–10.5)
WBC # FLD AUTO: 4.3 K/UL — SIGNIFICANT CHANGE UP (ref 3.8–10.5)

## 2023-07-12 PROCEDURE — 99214 OFFICE O/P EST MOD 30 MIN: CPT

## 2023-07-12 RX ORDER — NICOTINE POLACRILEX 2 MG
1 GUM BUCCAL
Refills: 0 | Status: DISCONTINUED | COMMUNITY
Start: 2022-08-23 | End: 2023-07-12

## 2023-07-13 NOTE — ASU PATIENT PROFILE, ADULT - NS PRO AD PATIENT TYPE
Health Care Proxy (HCP)
[phone]
Oral Minoxidil Pregnancy And Lactation Text: This medication should only be used when clearly needed if you are pregnant, attempting to become pregnant or breast feeding.

## 2023-07-18 LAB
ALBUMIN SERPL ELPH-MCNC: 4.4 G/DL
ALP BLD-CCNC: 111 U/L
ALT SERPL-CCNC: 30 U/L
ANION GAP SERPL CALC-SCNC: 12 MMOL/L
AST SERPL-CCNC: 35 U/L
BILIRUB SERPL-MCNC: 0.6 MG/DL
BUN SERPL-MCNC: 14 MG/DL
CALCIUM SERPL-MCNC: 9.4 MG/DL
CHLORIDE SERPL-SCNC: 104 MMOL/L
CO2 SERPL-SCNC: 25 MMOL/L
CREAT SERPL-MCNC: 0.78 MG/DL
EGFR: 87 ML/MIN/1.73M2
GLUCOSE SERPL-MCNC: 92 MG/DL
POTASSIUM SERPL-SCNC: 4 MMOL/L
PROT SERPL-MCNC: 6.8 G/DL
SODIUM SERPL-SCNC: 141 MMOL/L

## 2023-08-03 NOTE — ASSESSMENT
[FreeTextEntry1] : 62 year old postmenopausal female with stage IIA (T2) ER-/MA-/HER2 positive right breast cancer. S/p 6 cycles of neoadjuvant TCHP followed by bilateral mastectomy + BUNNY on 3/14/18, ypT0 ypN0 (pathologic complete response). Completed 1 year of Herceptin on 11/26/18.  She developed R mastectomy flap recurrence in 10/2020, s/p excision on 11/6/20 -> 3.8 cm poorly differentiated IDC, ER-/MA-/HER2 positive. Completed breast RT 2/25/21. She received adjuvant Kadcyla 1/11/21, dose reduced to 3 mg/kg in 7/2021 for elevated LFTs. Last cycle on 12/7/21. Discussed that there is no clear role for adjuvant neratinib in this setting as benefit was primarily seen in ER+/HER2 positive patients, and that too if given within 1 year of herceptin. Role of neratinib for ER-/HER2 positive disease and post Kadcyla is unclear.  02/05/2022 CT Chest -Interval resolution of right pleural and fissural thickening compared to 1/5/2022. 1/24/2022 Echo- LV EF 63%  9/10/22 CT AP IMPRESSION: No evidence of metastatic disease in the abdomen/pelvis. No hepatobiliary abnormality.  Reviewed CBC from today WBC 4.3 K HGB 14.5 g HCT 41.2 %  K  Plan:  -Thrombocytopenia- slight trending upward, today  K, stable. On slow release iron, iron panel ordered.  -Follow up with breast surgeon  -History of breast cancer recurrence in 2020- CT C/A/P ordered  -Continue age appropriate preventative screening + f/u with PCP  -Advised to call with concerns  -Follow up in 4 months with Dr Reddy

## 2023-08-03 NOTE — HISTORY OF PRESENT ILLNESS
[de-identified] : Ms. Young was diagnosed with HER2 positive right breast cancer at age 56 in August 2017.\par \par She initially presented with right nipple yellowish discharge. Subsequent mammogram and ultrasound on 7/27/17 showed new spiculated mass in inner central posterior right breast with sonographic confirmation of suspicious mass at 3-4:00 region, and an indeterminate hypoechoic nodule at 1:00 right breast.\par \par On 8/3/17 she had right breast core biopsy of 3-4:00 mass --> pathology showed invasive poorly differentiated ductal carcinoma, Enzo grade 8/9, 0.9 cm--> ER-, IL-, HER2 IHC 3+ (positive)\par Right breast 1:00 --> fibrocystic change, proliferative type with nodular sclerosing adenosis\par \par 8/21/17 MRI breast - Biopsy clip and 3.8 cm area of irregular non mass enhancement in the right breast at the 3-4:00 location posteriorly, corresponding to recently\par diagnosed invasive carcinoma. Additional irregular areas of enhancement in the right breast at the 2-3:00 location and 8-9:00 location, suspicious for additional disease. Representative MR guided core biopsy of enhancement at the 8-9:00 location can be performed to evaluate for multicentric disease, if it would change\par management. 8 mm enhancing nodule demonstrating washout kinetics in the upper slightly inner mid left breast. MR guided core biopsy is advised.\par Clip artifact in the right breast at the 1:00 location marking site of biopsy-proven radial scar. No associated suspicious enhancement. Recommend\par appropriate surgical management. 2.1 cm oval T2 bright lesion in the liver, probable cyst or hemangioma.\par Recommend dedicated liver ultrasound for further evaluation.\par \par 8/28/17 MRI Core biopsy right breast lower outer quadrant --> DCIS, solid pattern, high grade atypia, focal necrosis. ER/IL pending\par Core biopsy left breast central inner --> mild proliferative fibrocystic change\par \par Treatment Summary\par 9/21/17 port placement\par 9/20/17 - 1/3/18 TCHP x 6 cycles (9/30/17 - 1/03/18\par 5/03/18 - 11/26/18 Trastuzumab (Herceptin) X every 3 weeks, to complete 52 weeks.\par She had breast reconstruction on 8/14/18 with nipple reconstruction.\par S/p port removal on 6/19/19.\par \par 3/14/18 - She underwent right skin sparing mastectomy with SNL biopsy and left risk reduction mastectomy with BUNNY reconstruction.\par 1- Left breast, mastectomy- Fibrocystic changes\par 2- Right sentinel lymph node, excision- Three negative lymph nodes (0/3)\par 3- Right sentinel lymph node 2, excision- One negative lymph node (0/1)\par 4- Right breast, mastectomy- No residual invasive carcinoma (s/p neoadjuvant treatment)- No residual DCIS (s/p neoadjuvant treatment)\par  Pathologic staging: ypT0, ypN0\par \par . \par \par Disease: right breast cancer \par Pathology: HER2 positive \par TNM stage: T2 \par \par \par Interval History: Patient returns for follow up. \par She had a R breast US on 10/20/20 for palpable R breast lump which showed 3.5 cm mass at 4:00, 9 cmfn. \par 10/26/20 US guided biopsy R breast 4:00 --Breast, right, 4 o'clock, 9 cm FN, ultrasound guided core biopsy\par - Invasive poorly differentiated ductal carcinoma withfocal necrosis and foci with micropapillary features\par - Enzo score 8/9 (3 + 3+ 2 )\par - Invasive tumor measures at least 1.5 cm\par -ER 0%, IL 0%, HER2 3+\par \par On 11/6/20 she underwent right mastectomy flap excision - IDC, poorly differentiated with necrosis, involvement of dermal skin and retraction artifact, 3.8 x 3.1 x 3.1\par -ER 0%, IL 0%, HER2 3+.\par \par Patient completed breast radiation 2/25/21\par Kadcyla last treatment 6/14/21\par \par S/p right breast fat grafting with plastic surgeon Dr. Burleson in 6/2022  [de-identified] : Returns for follow up visit.  Denies ha, pain, dyspnea, cough Denies abdominal pain, nausea, vomiting, blood in stool, black stool, bleeding, hematuria Feels well

## 2023-08-03 NOTE — PHYSICAL EXAM
[Normal] : RRR, normal S1S2, no murmurs, rubs, gallops [de-identified] : supple [de-identified] : clear [de-identified] : S1S2 [de-identified] : Post surgical/RT changes in reconstructed R breast. No palpable mass or axillary adenopathy bilaterally.

## 2023-08-03 NOTE — ADDENDUM
[FreeTextEntry1] : Dr Reddy aware of above visit plan is for routine CT C/A/P due to history of breast cancer recurrence in 2020

## 2023-08-04 ENCOUNTER — RESULT REVIEW (OUTPATIENT)
Age: 63
End: 2023-08-04

## 2023-08-10 ENCOUNTER — NON-APPOINTMENT (OUTPATIENT)
Age: 63
End: 2023-08-10

## 2023-08-26 ENCOUNTER — APPOINTMENT (OUTPATIENT)
Dept: CT IMAGING | Facility: CLINIC | Age: 63
End: 2023-08-26
Payer: COMMERCIAL

## 2023-08-26 ENCOUNTER — OUTPATIENT (OUTPATIENT)
Dept: OUTPATIENT SERVICES | Facility: HOSPITAL | Age: 63
LOS: 1 days | End: 2023-08-26
Payer: COMMERCIAL

## 2023-08-26 DIAGNOSIS — D64.9 ANEMIA, UNSPECIFIED: ICD-10-CM

## 2023-08-26 DIAGNOSIS — T82.9XXA UNSPECIFIED COMPLICATION OF CARDIAC AND VASCULAR PROSTHETIC DEVICE, IMPLANT AND GRAFT, INITIAL ENCOUNTER: Chronic | ICD-10-CM

## 2023-08-26 DIAGNOSIS — Z95.828 PRESENCE OF OTHER VASCULAR IMPLANTS AND GRAFTS: Chronic | ICD-10-CM

## 2023-08-26 DIAGNOSIS — Z90.13 ACQUIRED ABSENCE OF BILATERAL BREASTS AND NIPPLES: Chronic | ICD-10-CM

## 2023-08-26 DIAGNOSIS — K76.9 LIVER DISEASE, UNSPECIFIED: ICD-10-CM

## 2023-08-26 DIAGNOSIS — C50.911 MALIGNANT NEOPLASM OF UNSPECIFIED SITE OF RIGHT FEMALE BREAST: ICD-10-CM

## 2023-08-26 DIAGNOSIS — Z98.891 HISTORY OF UTERINE SCAR FROM PREVIOUS SURGERY: Chronic | ICD-10-CM

## 2023-08-26 DIAGNOSIS — Z98.890 OTHER SPECIFIED POSTPROCEDURAL STATES: Chronic | ICD-10-CM

## 2023-08-26 DIAGNOSIS — Z90.710 ACQUIRED ABSENCE OF BOTH CERVIX AND UTERUS: Chronic | ICD-10-CM

## 2023-08-26 PROCEDURE — 74178 CT ABD&PLV WO CNTR FLWD CNTR: CPT

## 2023-08-26 PROCEDURE — 71260 CT THORAX DX C+: CPT

## 2023-08-26 PROCEDURE — 74178 CT ABD&PLV WO CNTR FLWD CNTR: CPT | Mod: 26

## 2023-08-26 PROCEDURE — 71260 CT THORAX DX C+: CPT | Mod: 26

## 2023-11-01 ENCOUNTER — APPOINTMENT (OUTPATIENT)
Dept: ENDOCRINOLOGY | Facility: CLINIC | Age: 63
End: 2023-11-01
Payer: COMMERCIAL

## 2023-11-01 VITALS
OXYGEN SATURATION: 97 % | WEIGHT: 190 LBS | BODY MASS INDEX: 35.87 KG/M2 | DIASTOLIC BLOOD PRESSURE: 82 MMHG | SYSTOLIC BLOOD PRESSURE: 120 MMHG | HEIGHT: 61.2 IN | HEART RATE: 86 BPM

## 2023-11-01 DIAGNOSIS — I10 ESSENTIAL (PRIMARY) HYPERTENSION: ICD-10-CM

## 2023-11-01 DIAGNOSIS — R79.89 OTHER SPECIFIED ABNORMAL FINDINGS OF BLOOD CHEMISTRY: ICD-10-CM

## 2023-11-01 DIAGNOSIS — E66.9 OBESITY, UNSPECIFIED: ICD-10-CM

## 2023-11-01 DIAGNOSIS — E78.5 HYPERLIPIDEMIA, UNSPECIFIED: ICD-10-CM

## 2023-11-01 DIAGNOSIS — E04.2 NONTOXIC MULTINODULAR GOITER: ICD-10-CM

## 2023-11-01 DIAGNOSIS — Z13.1 ENCOUNTER FOR SCREENING FOR DIABETES MELLITUS: ICD-10-CM

## 2023-11-01 PROCEDURE — 99215 OFFICE O/P EST HI 40 MIN: CPT

## 2023-11-29 NOTE — ASU PATIENT PROFILE, ADULT - NS PRO TALK SOMEONE YN
Report received from NOC  RN. Assume care. Pt.arrived to unit AAOx4.  Assessment completed. VSS. C/O back pain, able to wiggle toes and dorsi/plantar flex feet, good CMS and pulses to jass LE, except  LLE numbness and tingling.  Pt was update for the care for the day. White board updated, All question answered. Pt has call light within reach,  bed is in the lowest position. Pt has no other needs at this time.   1800 Report given to ROWAN Hernández rm [id number] Pt off the unit with transport. All belongings taken.     no

## 2024-01-17 ENCOUNTER — OUTPATIENT (OUTPATIENT)
Dept: OUTPATIENT SERVICES | Facility: HOSPITAL | Age: 64
LOS: 1 days | Discharge: HOME | End: 2024-01-17

## 2024-01-17 DIAGNOSIS — Z98.890 OTHER SPECIFIED POSTPROCEDURAL STATES: Chronic | ICD-10-CM

## 2024-01-17 DIAGNOSIS — Z95.828 PRESENCE OF OTHER VASCULAR IMPLANTS AND GRAFTS: Chronic | ICD-10-CM

## 2024-01-17 DIAGNOSIS — C50.919 MALIGNANT NEOPLASM OF UNSPECIFIED SITE OF UNSPECIFIED FEMALE BREAST: ICD-10-CM

## 2024-01-17 DIAGNOSIS — Z90.710 ACQUIRED ABSENCE OF BOTH CERVIX AND UTERUS: Chronic | ICD-10-CM

## 2024-01-17 DIAGNOSIS — Z98.891 HISTORY OF UTERINE SCAR FROM PREVIOUS SURGERY: Chronic | ICD-10-CM

## 2024-01-17 DIAGNOSIS — T82.9XXA UNSPECIFIED COMPLICATION OF CARDIAC AND VASCULAR PROSTHETIC DEVICE, IMPLANT AND GRAFT, INITIAL ENCOUNTER: Chronic | ICD-10-CM

## 2024-01-17 DIAGNOSIS — Z90.13 ACQUIRED ABSENCE OF BILATERAL BREASTS AND NIPPLES: Chronic | ICD-10-CM

## 2024-05-20 ENCOUNTER — OUTPATIENT (OUTPATIENT)
Dept: OUTPATIENT SERVICES | Facility: HOSPITAL | Age: 64
LOS: 1 days | Discharge: HOME | End: 2024-05-20

## 2024-05-20 DIAGNOSIS — Z90.710 ACQUIRED ABSENCE OF BOTH CERVIX AND UTERUS: Chronic | ICD-10-CM

## 2024-05-20 DIAGNOSIS — Z90.13 ACQUIRED ABSENCE OF BILATERAL BREASTS AND NIPPLES: Chronic | ICD-10-CM

## 2024-05-20 DIAGNOSIS — Z98.890 OTHER SPECIFIED POSTPROCEDURAL STATES: Chronic | ICD-10-CM

## 2024-05-20 DIAGNOSIS — Z98.891 HISTORY OF UTERINE SCAR FROM PREVIOUS SURGERY: Chronic | ICD-10-CM

## 2024-05-20 DIAGNOSIS — T82.9XXA UNSPECIFIED COMPLICATION OF CARDIAC AND VASCULAR PROSTHETIC DEVICE, IMPLANT AND GRAFT, INITIAL ENCOUNTER: Chronic | ICD-10-CM

## 2024-05-20 DIAGNOSIS — Z95.828 PRESENCE OF OTHER VASCULAR IMPLANTS AND GRAFTS: Chronic | ICD-10-CM

## 2024-05-20 DIAGNOSIS — C50.919 MALIGNANT NEOPLASM OF UNSPECIFIED SITE OF UNSPECIFIED FEMALE BREAST: ICD-10-CM

## 2024-05-31 NOTE — ASU DISCHARGE PLAN (ADULT/PEDIATRIC) - CONDITION AT DISCHARGE
05/31/24 0840   OTHER   Discipline occupational therapist   Rehab Time/Intention   Session Not Performed   (noted xfer to ICU 5/30 and now intubated.  discuss with RN and agree follow up after weekend for OT.)   Recommendation   OT - Next Appointment 06/03/24        Stable

## 2024-07-02 ENCOUNTER — APPOINTMENT (OUTPATIENT)
Dept: HEMATOLOGY ONCOLOGY | Facility: CLINIC | Age: 64
End: 2024-07-02
Payer: COMMERCIAL

## 2024-07-02 ENCOUNTER — RESULT REVIEW (OUTPATIENT)
Age: 64
End: 2024-07-02

## 2024-07-02 ENCOUNTER — APPOINTMENT (OUTPATIENT)
Dept: ULTRASOUND IMAGING | Facility: CLINIC | Age: 64
End: 2024-07-02
Payer: COMMERCIAL

## 2024-07-02 VITALS
WEIGHT: 203.71 LBS | HEIGHT: 61 IN | BODY MASS INDEX: 38.46 KG/M2 | HEART RATE: 80 BPM | OXYGEN SATURATION: 96 % | TEMPERATURE: 97.6 F

## 2024-07-02 DIAGNOSIS — C50.911 MALIGNANT NEOPLASM OF UNSPECIFIED SITE OF RIGHT FEMALE BREAST: ICD-10-CM

## 2024-07-02 LAB
BASOPHILS # BLD AUTO: 0.1 K/UL — SIGNIFICANT CHANGE UP (ref 0–0.2)
BASOPHILS NFR BLD AUTO: 1.6 % — SIGNIFICANT CHANGE UP (ref 0–2)
EOSINOPHIL # BLD AUTO: 0.3 K/UL — SIGNIFICANT CHANGE UP (ref 0–0.5)
EOSINOPHIL NFR BLD AUTO: 4.9 % — SIGNIFICANT CHANGE UP (ref 0–6)
HCT VFR BLD CALC: 42.2 % — SIGNIFICANT CHANGE UP (ref 34.5–45)
HGB BLD-MCNC: 14.4 G/DL — SIGNIFICANT CHANGE UP (ref 11.5–15.5)
LYMPHOCYTES # BLD AUTO: 0.8 K/UL — LOW (ref 1–3.3)
LYMPHOCYTES # BLD AUTO: 13.3 % — SIGNIFICANT CHANGE UP (ref 13–44)
MCHC RBC-ENTMCNC: 29.8 PG — SIGNIFICANT CHANGE UP (ref 27–34)
MCHC RBC-ENTMCNC: 34.2 G/DL — SIGNIFICANT CHANGE UP (ref 32–36)
MCV RBC AUTO: 87.3 FL — SIGNIFICANT CHANGE UP (ref 80–100)
MONOCYTES # BLD AUTO: 0.4 K/UL — SIGNIFICANT CHANGE UP (ref 0–0.9)
MONOCYTES NFR BLD AUTO: 6.4 % — SIGNIFICANT CHANGE UP (ref 2–14)
NEUTROPHILS # BLD AUTO: 4.4 K/UL — SIGNIFICANT CHANGE UP (ref 1.8–7.4)
NEUTROPHILS NFR BLD AUTO: 73.8 % — SIGNIFICANT CHANGE UP (ref 43–77)
PLATELET # BLD AUTO: 165 K/UL — SIGNIFICANT CHANGE UP (ref 150–400)
RBC # BLD: 4.84 M/UL — SIGNIFICANT CHANGE UP (ref 3.8–5.2)
RBC # FLD: 11 % — SIGNIFICANT CHANGE UP (ref 10.3–14.5)
WBC # BLD: 6 K/UL — SIGNIFICANT CHANGE UP (ref 3.8–10.5)
WBC # FLD AUTO: 6 K/UL — SIGNIFICANT CHANGE UP (ref 3.8–10.5)

## 2024-07-02 PROCEDURE — 76536 US EXAM OF HEAD AND NECK: CPT

## 2024-07-02 PROCEDURE — G2211 COMPLEX E/M VISIT ADD ON: CPT | Mod: NC

## 2024-07-02 PROCEDURE — 99214 OFFICE O/P EST MOD 30 MIN: CPT

## 2024-07-05 LAB
ALBUMIN SERPL ELPH-MCNC: 4.3 G/DL
ALP BLD-CCNC: 130 U/L
ALT SERPL-CCNC: 41 U/L
ANION GAP SERPL CALC-SCNC: 12 MMOL/L
AST SERPL-CCNC: 39 U/L
BILIRUB SERPL-MCNC: 0.6 MG/DL
BUN SERPL-MCNC: 16 MG/DL
CALCIUM SERPL-MCNC: 9.8 MG/DL
CHLORIDE SERPL-SCNC: 104 MMOL/L
CO2 SERPL-SCNC: 27 MMOL/L
CREAT SERPL-MCNC: 0.77 MG/DL
EGFR: 88 ML/MIN/1.73M2
GLUCOSE SERPL-MCNC: 101 MG/DL
POTASSIUM SERPL-SCNC: 4.1 MMOL/L
PROT SERPL-MCNC: 6.8 G/DL
SODIUM SERPL-SCNC: 144 MMOL/L

## 2024-11-06 ENCOUNTER — NON-APPOINTMENT (OUTPATIENT)
Age: 64
End: 2024-11-06

## 2024-11-07 ENCOUNTER — APPOINTMENT (OUTPATIENT)
Dept: ENDOCRINOLOGY | Facility: CLINIC | Age: 64
End: 2024-11-07
Payer: COMMERCIAL

## 2024-11-07 VITALS
WEIGHT: 203 LBS | OXYGEN SATURATION: 98 % | SYSTOLIC BLOOD PRESSURE: 122 MMHG | DIASTOLIC BLOOD PRESSURE: 84 MMHG | HEIGHT: 61 IN | BODY MASS INDEX: 38.33 KG/M2 | HEART RATE: 87 BPM

## 2024-11-07 DIAGNOSIS — E66.9 OBESITY, UNSPECIFIED: ICD-10-CM

## 2024-11-07 DIAGNOSIS — S22.39XA FRACTURE OF ONE RIB, UNSPECIFIED SIDE, INITIAL ENCOUNTER FOR CLOSED FRACTURE: ICD-10-CM

## 2024-11-07 DIAGNOSIS — E04.2 NONTOXIC MULTINODULAR GOITER: ICD-10-CM

## 2024-11-07 DIAGNOSIS — I10 ESSENTIAL (PRIMARY) HYPERTENSION: ICD-10-CM

## 2024-11-07 DIAGNOSIS — R74.8 ABNORMAL LEVELS OF OTHER SERUM ENZYMES: ICD-10-CM

## 2024-11-07 DIAGNOSIS — R79.89 OTHER SPECIFIED ABNORMAL FINDINGS OF BLOOD CHEMISTRY: ICD-10-CM

## 2024-11-07 DIAGNOSIS — M81.0 AGE-RELATED OSTEOPOROSIS W/OUT CURRENT PATHOLOGICAL FRACTURE: ICD-10-CM

## 2024-11-07 DIAGNOSIS — E78.5 HYPERLIPIDEMIA, UNSPECIFIED: ICD-10-CM

## 2024-11-07 PROCEDURE — 99215 OFFICE O/P EST HI 40 MIN: CPT

## 2024-11-07 RX ORDER — AMLODIPINE BESYLATE 5 MG/1
5 TABLET ORAL
Refills: 0 | Status: ACTIVE | COMMUNITY

## 2025-01-27 ENCOUNTER — OUTPATIENT (OUTPATIENT)
Dept: OUTPATIENT SERVICES | Facility: HOSPITAL | Age: 65
LOS: 1 days | Discharge: HOME | End: 2025-01-27

## 2025-01-27 DIAGNOSIS — Z95.828 PRESENCE OF OTHER VASCULAR IMPLANTS AND GRAFTS: Chronic | ICD-10-CM

## 2025-01-27 DIAGNOSIS — Z98.890 OTHER SPECIFIED POSTPROCEDURAL STATES: Chronic | ICD-10-CM

## 2025-01-27 DIAGNOSIS — Z90.710 ACQUIRED ABSENCE OF BOTH CERVIX AND UTERUS: Chronic | ICD-10-CM

## 2025-01-27 DIAGNOSIS — Z98.891 HISTORY OF UTERINE SCAR FROM PREVIOUS SURGERY: Chronic | ICD-10-CM

## 2025-01-27 DIAGNOSIS — T82.9XXA UNSPECIFIED COMPLICATION OF CARDIAC AND VASCULAR PROSTHETIC DEVICE, IMPLANT AND GRAFT, INITIAL ENCOUNTER: Chronic | ICD-10-CM

## 2025-01-27 DIAGNOSIS — Z90.13 ACQUIRED ABSENCE OF BILATERAL BREASTS AND NIPPLES: Chronic | ICD-10-CM

## 2025-03-17 ENCOUNTER — APPOINTMENT (OUTPATIENT)
Dept: HEMATOLOGY ONCOLOGY | Facility: CLINIC | Age: 65
End: 2025-03-17
Payer: COMMERCIAL

## 2025-03-17 ENCOUNTER — NON-APPOINTMENT (OUTPATIENT)
Age: 65
End: 2025-03-17

## 2025-03-17 ENCOUNTER — LABORATORY RESULT (OUTPATIENT)
Age: 65
End: 2025-03-17

## 2025-03-17 ENCOUNTER — RESULT REVIEW (OUTPATIENT)
Age: 65
End: 2025-03-17

## 2025-03-17 VITALS
HEART RATE: 83 BPM | DIASTOLIC BLOOD PRESSURE: 97 MMHG | SYSTOLIC BLOOD PRESSURE: 166 MMHG | BODY MASS INDEX: 40.19 KG/M2 | OXYGEN SATURATION: 97 % | WEIGHT: 212.85 LBS | HEIGHT: 61 IN

## 2025-03-17 DIAGNOSIS — C50.911 MALIGNANT NEOPLASM OF UNSPECIFIED SITE OF RIGHT FEMALE BREAST: ICD-10-CM

## 2025-03-17 DIAGNOSIS — Z17.31 MALIGNANT NEOPLASM OF UNSPECIFIED SITE OF RIGHT FEMALE BREAST: ICD-10-CM

## 2025-03-17 LAB
BASOPHILS # BLD AUTO: 0.04 K/UL — SIGNIFICANT CHANGE UP (ref 0–0.2)
BASOPHILS NFR BLD AUTO: 0.8 % — SIGNIFICANT CHANGE UP (ref 0–2)
EOSINOPHIL # BLD AUTO: 0.24 K/UL — SIGNIFICANT CHANGE UP (ref 0–0.5)
EOSINOPHIL NFR BLD AUTO: 4.8 % — SIGNIFICANT CHANGE UP (ref 0–6)
HCT VFR BLD CALC: 39.5 % — SIGNIFICANT CHANGE UP (ref 34.5–45)
HGB BLD-MCNC: 13.5 G/DL — SIGNIFICANT CHANGE UP (ref 11.5–15.5)
IMM GRANULOCYTES # BLD AUTO: 0.02 K/UL — SIGNIFICANT CHANGE UP (ref 0–0.07)
IMM GRANULOCYTES NFR BLD AUTO: 0.4 % — SIGNIFICANT CHANGE UP (ref 0–0.9)
LYMPHOCYTES # BLD AUTO: 0.8 K/UL — LOW (ref 1–3.3)
LYMPHOCYTES NFR BLD AUTO: 16.1 % — SIGNIFICANT CHANGE UP (ref 13–44)
MCHC RBC-ENTMCNC: 29.2 PG — SIGNIFICANT CHANGE UP (ref 27–34)
MCHC RBC-ENTMCNC: 34.2 G/DL — SIGNIFICANT CHANGE UP (ref 32–36)
MCV RBC AUTO: 85.5 FL — SIGNIFICANT CHANGE UP (ref 80–100)
MONOCYTES # BLD AUTO: 0.4 K/UL — SIGNIFICANT CHANGE UP (ref 0–0.9)
MONOCYTES NFR BLD AUTO: 8 % — SIGNIFICANT CHANGE UP (ref 2–14)
NEUTROPHILS # BLD AUTO: 3.47 K/UL — SIGNIFICANT CHANGE UP (ref 1.8–7.4)
NEUTROPHILS NFR BLD AUTO: 69.9 % — SIGNIFICANT CHANGE UP (ref 43–77)
NRBC # BLD AUTO: 0 K/UL — SIGNIFICANT CHANGE UP (ref 0–0)
NRBC # FLD: 0 K/UL — SIGNIFICANT CHANGE UP (ref 0–0)
NRBC BLD AUTO-RTO: 0 /100 WBCS — SIGNIFICANT CHANGE UP (ref 0–0)
PLATELET # BLD AUTO: 174 K/UL — SIGNIFICANT CHANGE UP (ref 150–400)
PMV BLD: 9.5 FL — SIGNIFICANT CHANGE UP (ref 7–13)
RBC # BLD: 4.62 M/UL — SIGNIFICANT CHANGE UP (ref 3.8–5.2)
RBC # FLD: 12 % — SIGNIFICANT CHANGE UP (ref 10.3–14.5)
WBC # BLD: 4.97 K/UL — SIGNIFICANT CHANGE UP (ref 3.8–10.5)
WBC # FLD AUTO: 4.97 K/UL — SIGNIFICANT CHANGE UP (ref 3.8–10.5)

## 2025-03-17 PROCEDURE — G2211 COMPLEX E/M VISIT ADD ON: CPT | Mod: NC

## 2025-03-17 PROCEDURE — 99214 OFFICE O/P EST MOD 30 MIN: CPT

## 2025-03-17 RX ORDER — AMLODIPINE BESYLATE 5 MG/1
5 TABLET ORAL
Refills: 0 | Status: ACTIVE | COMMUNITY

## 2025-03-18 LAB
ALBUMIN SERPL ELPH-MCNC: 4.3 G/DL
ALP BLD-CCNC: 134 U/L
ALT SERPL-CCNC: 33 U/L
ANION GAP SERPL CALC-SCNC: 11 MMOL/L
AST SERPL-CCNC: 33 U/L
BILIRUB SERPL-MCNC: 0.6 MG/DL
BUN SERPL-MCNC: 18 MG/DL
CALCIUM SERPL-MCNC: 9.1 MG/DL
CHLORIDE SERPL-SCNC: 105 MMOL/L
CO2 SERPL-SCNC: 26 MMOL/L
CREAT SERPL-MCNC: 0.8 MG/DL
EGFRCR SERPLBLD CKD-EPI 2021: 83 ML/MIN/1.73M2
GLUCOSE SERPL-MCNC: 97 MG/DL
POTASSIUM SERPL-SCNC: 4 MMOL/L
PROT SERPL-MCNC: 6.7 G/DL
SODIUM SERPL-SCNC: 143 MMOL/L

## 2025-06-23 ENCOUNTER — APPOINTMENT (OUTPATIENT)
Dept: ULTRASOUND IMAGING | Facility: CLINIC | Age: 65
End: 2025-06-23
Payer: COMMERCIAL

## 2025-06-23 PROCEDURE — 76536 US EXAM OF HEAD AND NECK: CPT

## 2025-07-03 ENCOUNTER — APPOINTMENT (OUTPATIENT)
Dept: ENDOCRINOLOGY | Facility: CLINIC | Age: 65
End: 2025-07-03
Payer: COMMERCIAL

## 2025-07-03 VITALS
BODY MASS INDEX: 38.51 KG/M2 | HEIGHT: 61 IN | HEART RATE: 82 BPM | DIASTOLIC BLOOD PRESSURE: 86 MMHG | SYSTOLIC BLOOD PRESSURE: 120 MMHG | WEIGHT: 204 LBS | OXYGEN SATURATION: 98 %

## 2025-07-03 PROCEDURE — 99214 OFFICE O/P EST MOD 30 MIN: CPT

## 2025-07-03 PROCEDURE — G2211 COMPLEX E/M VISIT ADD ON: CPT | Mod: NC

## 2025-07-09 ENCOUNTER — RESULT REVIEW (OUTPATIENT)
Age: 65
End: 2025-07-09

## 2025-07-09 ENCOUNTER — APPOINTMENT (OUTPATIENT)
Dept: RADIOLOGY | Facility: CLINIC | Age: 65
End: 2025-07-09
Payer: COMMERCIAL

## 2025-07-09 PROCEDURE — 77080 DXA BONE DENSITY AXIAL: CPT

## (undated) DEVICE — VENODYNE/SCD SLEEVE CALF MEDIUM

## (undated) DEVICE — SYR LUER LOK 50CC

## (undated) DEVICE — SUT MONOCRYL 4-0 18" PS-2

## (undated) DEVICE — DRAPE 3/4 SHEET 52X76"

## (undated) DEVICE — SUT PLAIN GUT FAST ABSORBING 5-0 PC-1

## (undated) DEVICE — PACK GENERAL MINOR

## (undated) DEVICE — POSITIONER STRAP ARMBOARD VELCRO TS-30

## (undated) DEVICE — DRAPE IOBAN 23" X 23"

## (undated) DEVICE — STAPLER SKIN VISI-STAT 35 WIDE

## (undated) DEVICE — ELCTR BOVIE PENCIL BLADE 10FT

## (undated) DEVICE — DRAPE SPLIT SHEET 77" X 108"

## (undated) DEVICE — SOL IRR POUR NS 0.9% 500ML

## (undated) DEVICE — Device

## (undated) DEVICE — DRSG CURITY GAUZE SPONGE 4 X 4" 12-PLY

## (undated) DEVICE — TUBING HI-VAC PSI-TEC STERILE

## (undated) DEVICE — TUBING INFILTRATION

## (undated) DEVICE — SUT NYLON 2-0 18" FS

## (undated) DEVICE — WARMING BLANKET LOWER ADULT

## (undated) DEVICE — ELCTR BOVIE TIP BLADE INSULATED 2.75" EDGE

## (undated) DEVICE — DRSG DERMABOND 0.7ML

## (undated) DEVICE — DRSG STERISTRIPS 0.5 X 4"

## (undated) DEVICE — DRAPE 1/2 SHEET 40X57"

## (undated) DEVICE — LAP PAD 18 X 18"

## (undated) DEVICE — ABDOMINAL BINDER MED/LG 9" X 36"-64"

## (undated) DEVICE — PROTECTOR HEEL / ELBOW FLUFFY

## (undated) DEVICE — TUBING LIPOSUCTION

## (undated) DEVICE — SOL IRR POUR H2O 500ML

## (undated) DEVICE — ONETRAC LIGHTED RETRACTOR 90 X 22MM DISP

## (undated) DEVICE — SUT MONOCRYL 3-0 18" PS-2 UNDYED

## (undated) DEVICE — GOWN LG

## (undated) DEVICE — SUT VICRYL 3-0 18" PS-2 UNDYED

## (undated) DEVICE — LABELS BLANK W PEN

## (undated) DEVICE — SYR LUER LOCK 60ML

## (undated) DEVICE — TUBING ASPIRATION HI VAC LIPOSUCTION 8FT

## (undated) DEVICE — POSITIONER FOAM EGG CRATE ULNAR 2PCS (PINK)

## (undated) DEVICE — DRAPE INSTRUMENT POUCH 6.75" X 11"

## (undated) DEVICE — SYR LUER LOK 10CC

## (undated) DEVICE — SPECIMEN TRAP 70ML

## (undated) DEVICE — SUT ETHILON 5-0 18" P-3